# Patient Record
Sex: FEMALE | Race: WHITE | NOT HISPANIC OR LATINO | Employment: FULL TIME | URBAN - METROPOLITAN AREA
[De-identification: names, ages, dates, MRNs, and addresses within clinical notes are randomized per-mention and may not be internally consistent; named-entity substitution may affect disease eponyms.]

---

## 2017-01-20 ENCOUNTER — ALLSCRIPTS OFFICE VISIT (OUTPATIENT)
Dept: OTHER | Facility: OTHER | Age: 27
End: 2017-01-20

## 2017-04-18 ENCOUNTER — ALLSCRIPTS OFFICE VISIT (OUTPATIENT)
Dept: OTHER | Facility: OTHER | Age: 27
End: 2017-04-18

## 2017-05-18 ENCOUNTER — ALLSCRIPTS OFFICE VISIT (OUTPATIENT)
Dept: OTHER | Facility: OTHER | Age: 27
End: 2017-05-18

## 2017-06-30 ENCOUNTER — ALLSCRIPTS OFFICE VISIT (OUTPATIENT)
Dept: OTHER | Facility: OTHER | Age: 27
End: 2017-06-30

## 2017-09-06 ENCOUNTER — ALLSCRIPTS OFFICE VISIT (OUTPATIENT)
Dept: OTHER | Facility: OTHER | Age: 27
End: 2017-09-06

## 2017-11-21 ENCOUNTER — GENERIC CONVERSION - ENCOUNTER (OUTPATIENT)
Dept: OTHER | Facility: OTHER | Age: 27
End: 2017-11-21

## 2018-01-11 NOTE — RESULT NOTES
Message  PPD  placed 11-11-16  PPD read 11-14-16  negative -0- mm        Signatures   Electronically signed by : Criss Castaneda, ; Nov 14 2016  8:18AM EST                       (Author)

## 2018-01-12 VITALS
DIASTOLIC BLOOD PRESSURE: 78 MMHG | HEIGHT: 67 IN | BODY MASS INDEX: 20.72 KG/M2 | WEIGHT: 132 LBS | TEMPERATURE: 97.9 F | SYSTOLIC BLOOD PRESSURE: 100 MMHG

## 2018-01-12 NOTE — RESULT NOTES
Message   PPD p;aced on 1-11-16  PPD read on 1-13-16 -0-mm negative        Plan  Health Maintenance    · PPD    Signatures   Electronically signed by : Danielle Chao, [address] [place], [place] [address]; Jan 13 2016  1:51PM EST                       (Author)

## 2018-01-13 VITALS
SYSTOLIC BLOOD PRESSURE: 104 MMHG | BODY MASS INDEX: 20.95 KG/M2 | DIASTOLIC BLOOD PRESSURE: 78 MMHG | TEMPERATURE: 98.9 F | OXYGEN SATURATION: 98 % | WEIGHT: 133.5 LBS | HEIGHT: 67 IN | HEART RATE: 59 BPM

## 2018-01-14 VITALS
HEIGHT: 67 IN | BODY MASS INDEX: 21.19 KG/M2 | TEMPERATURE: 97.9 F | SYSTOLIC BLOOD PRESSURE: 102 MMHG | WEIGHT: 135 LBS | DIASTOLIC BLOOD PRESSURE: 80 MMHG

## 2018-01-14 VITALS
WEIGHT: 137 LBS | SYSTOLIC BLOOD PRESSURE: 120 MMHG | DIASTOLIC BLOOD PRESSURE: 68 MMHG | TEMPERATURE: 98.4 F | HEIGHT: 67 IN | BODY MASS INDEX: 21.5 KG/M2

## 2018-01-15 VITALS
BODY MASS INDEX: 20.88 KG/M2 | WEIGHT: 133 LBS | HEIGHT: 67 IN | TEMPERATURE: 98.1 F | SYSTOLIC BLOOD PRESSURE: 102 MMHG | DIASTOLIC BLOOD PRESSURE: 78 MMHG

## 2018-01-16 NOTE — PROGRESS NOTES
Chief Complaint  Pt presents in office for PPD , will return in 50 to 72 hours to have read      Active Problems    1  ADD (attention deficit disorder) without hyperactivity (314 00) (F90 0)    Current Meds   1  Tilia Fe 1-20/1-30/1-35 MG-MCG Oral Tablet; TAKE 1 TABLET DAILY AS DIRECTED; Therapy: 13SAT4962 to (Evaluate:05Tmx4408)  Requested for: 55KNN1292; Last   Rx:[id number] Ordered   2  Tri-Legest Fe 1-20/1-30/1-35 MG-MCG Oral Tablet; TAKE 1 TABLET DAILY AS   DIRECTED; Therapy: 94Qfg9938 to (Evaluate:30Vwv2031)  Requested for: 68Cjh1501; Last   Rx:[id number] Ordered   3  Vyvanse 40 MG Oral Capsule; take 1 capsule daily; Therapy: 30BUH7214 to (Evaluate:88Azx0787)  Requested for: 17XCI4501; Last   Rx:[id number] Ordered    Allergies    1  No Known Drug Allergies    Plan   PPD; INJECT 0 1  ML Subcutaneous;  Dose: 0 1; Route: Intradermal; Site: Left Forearm; Done: [id number] 09:05AM; Status: Complete;  Ordered  For: Tuberculosis screening; Ordered By:Jenni Vargas; Effective Date:11Nov2016; Administered by: Yoni Silveira: 11/11/2016 9:05:00 AM     Signatures   Electronically signed by : Aliyah Moeller, ; Nov 14 2016  8:27AM EST                       (Author)    Electronically signed by : Laxmi Nobles DO; Nov 15 2016 10:30AM EST

## 2018-01-22 VITALS
DIASTOLIC BLOOD PRESSURE: 85 MMHG | WEIGHT: 136 LBS | BODY MASS INDEX: 21.35 KG/M2 | SYSTOLIC BLOOD PRESSURE: 120 MMHG | HEIGHT: 67 IN | TEMPERATURE: 99 F

## 2018-03-20 ENCOUNTER — OFFICE VISIT (OUTPATIENT)
Dept: FAMILY MEDICINE CLINIC | Facility: CLINIC | Age: 28
End: 2018-03-20
Payer: COMMERCIAL

## 2018-03-20 VITALS
WEIGHT: 140 LBS | TEMPERATURE: 99 F | BODY MASS INDEX: 21.97 KG/M2 | DIASTOLIC BLOOD PRESSURE: 68 MMHG | SYSTOLIC BLOOD PRESSURE: 112 MMHG | HEIGHT: 67 IN

## 2018-03-20 DIAGNOSIS — M25.562 LEFT KNEE PAIN, UNSPECIFIED CHRONICITY: ICD-10-CM

## 2018-03-20 DIAGNOSIS — F98.8 ADD (ATTENTION DEFICIT DISORDER) WITHOUT HYPERACTIVITY: Primary | ICD-10-CM

## 2018-03-20 PROCEDURE — 99213 OFFICE O/P EST LOW 20 MIN: CPT | Performed by: FAMILY MEDICINE

## 2018-03-20 NOTE — PROGRESS NOTES
Assessment/Plan:  Recommended consideration for orthopedic evaluation  Card given for [employer]  No problem-specific Assessment & Plan notes found for this encounter  Diagnoses and all orders for this visit:    ADD (attention deficit disorder) without hyperactivity  -     Discontinue: lisdexamfetamine (VYVANSE) 40 MG capsule; Take 1 capsule (40 mg total) by mouth daily Max Daily Amount: 40 mg  -     Discontinue: lisdexamfetamine (VYVANSE) 40 MG capsule; Take 1 capsule (40 mg total) by mouth daily Max Daily Amount: 40 mg  -     Discontinue: lisdexamfetamine (VYVANSE) 40 MG capsule; Take 1 capsule (40 mg total) by mouth daily Max Daily Amount: 40 mg  -     lisdexamfetamine (VYVANSE) 40 MG capsule; Take 1 capsule (40 mg total) by mouth daily Max Daily Amount: 40 mg    Other orders  -     norethindrone-ethinyl estradiol-iron (TRI-LEGEST FE) 1-20/1-30/1-35 MG-MCG TABS; Take 1 tablet by mouth daily  -     Discontinue: lisdexamfetamine (VYVANSE) 40 MG capsule; Take 1 capsule by mouth daily          Subjective:      Patient ID: Radha Forman is a 24 y o  female  Patient is here for recheck on attention deficit disorder symptoms  She is tolerating medication well  Denies any palpitations or chest pain  No weight changes or appetite changes  Sleep is normal   She states that current dose is helping her to focus well with work  Denies any other significant concerns other than occasional left lateral knee pain with running  The following portions of the patient's history were reviewed and updated as appropriate: allergies, current medications, past family history, past medical history, past social history, past surgical history and problem list     Review of Systems   Constitutional: Negative  HENT: Negative  Eyes: Negative  Respiratory: Negative  Cardiovascular: Negative  Gastrointestinal: Negative  Endocrine: Negative  Genitourinary: Negative  Musculoskeletal: Positive for joint swelling  Skin: Negative  Allergic/Immunologic: Negative  Neurological: Negative  Hematological: Negative  Psychiatric/Behavioral: Negative  Objective:      /68   Temp 99 °F (37 2 °C)   Ht 5' 7" (1 702 m)   Wt 63 5 kg (140 lb)   BMI 21 93 kg/m²          Physical Exam   Constitutional: She is oriented to person, place, and time  She appears well-developed and well-nourished  HENT:   Head: Normocephalic and atraumatic  Right Ear: External ear normal  Tympanic membrane is not erythematous and not bulging  Left Ear: External ear normal  Tympanic membrane is not erythematous and not bulging  Nose: Nose normal    Mouth/Throat: Oropharynx is clear and moist and mucous membranes are normal  No oral lesions  No oropharyngeal exudate  Eyes: Conjunctivae and EOM are normal  Right eye exhibits no discharge  Left eye exhibits no discharge  No scleral icterus  Neck: Normal range of motion  Neck supple  No thyromegaly present  Cardiovascular: Normal rate, regular rhythm and normal heart sounds  Exam reveals no gallop and no friction rub  No murmur heard  Pulmonary/Chest: Effort normal  No respiratory distress  She has no wheezes  She has no rales  She exhibits no tenderness  Abdominal: Soft  Bowel sounds are normal  She exhibits no distension and no mass  There is no tenderness  There is no rebound and no guarding  Musculoskeletal: Normal range of motion  She exhibits no edema, tenderness or deformity  Lymphadenopathy:     She has no cervical adenopathy  Neurological: She is alert and oriented to person, place, and time  She has normal reflexes  No cranial nerve deficit  She exhibits normal muscle tone  Coordination normal    Skin: Skin is warm and dry  No rash noted  No erythema  No pallor  Psychiatric: She has a normal mood and affect  Her behavior is normal    Vitals reviewed

## 2018-05-15 ENCOUNTER — OFFICE VISIT (OUTPATIENT)
Dept: FAMILY MEDICINE CLINIC | Facility: CLINIC | Age: 28
End: 2018-05-15
Payer: COMMERCIAL

## 2018-05-15 VITALS
DIASTOLIC BLOOD PRESSURE: 68 MMHG | SYSTOLIC BLOOD PRESSURE: 108 MMHG | BODY MASS INDEX: 20.83 KG/M2 | WEIGHT: 133 LBS | TEMPERATURE: 99 F

## 2018-05-15 DIAGNOSIS — S39.012A STRAIN OF LUMBAR REGION, INITIAL ENCOUNTER: ICD-10-CM

## 2018-05-15 DIAGNOSIS — J01.00 ACUTE NON-RECURRENT MAXILLARY SINUSITIS: Primary | ICD-10-CM

## 2018-05-15 PROCEDURE — 99214 OFFICE O/P EST MOD 30 MIN: CPT | Performed by: FAMILY MEDICINE

## 2018-05-15 RX ORDER — FLUTICASONE PROPIONATE 50 MCG
2 SPRAY, SUSPENSION (ML) NASAL DAILY
Qty: 16 G | Refills: 0 | Status: SHIPPED | OUTPATIENT
Start: 2018-05-15 | End: 2018-06-22

## 2018-05-15 RX ORDER — AMOXICILLIN AND CLAVULANATE POTASSIUM 875; 125 MG/1; MG/1
1 TABLET, FILM COATED ORAL EVERY 12 HOURS SCHEDULED
Qty: 14 TABLET | Refills: 0 | Status: SHIPPED | OUTPATIENT
Start: 2018-05-15 | End: 2018-05-22

## 2018-05-15 NOTE — PROGRESS NOTES
Assessment/Plan:  Recommended consideration for physical therapy and naproxen twice daily  She will call if no improvement or worsening symptoms regarding back pain  She would prefer to hold off on physical therapy at this point and use over-the-counter anti-inflammatory medication  If no improvement sinus pressure and congestion recommend return to office  No problem-specific Assessment & Plan notes found for this encounter  Diagnoses and all orders for this visit:    Acute non-recurrent maxillary sinusitis  -     amoxicillin-clavulanate (AUGMENTIN) 875-125 mg per tablet; Take 1 tablet by mouth every 12 (twelve) hours for 7 days  -     fluticasone (FLONASE) 50 mcg/act nasal spray; 2 sprays into each nostril daily for 30 days          Subjective:      Patient ID: Payal Wade is a 24 y o  female  Patient here with multiple concerns  She has sinus pressure and congestion over the last 1 week  Over-the-counter decongestants have not been helpful  She denies any coughing  No fevers  She also has been playing softball in an  [employer] league  She aggravated her low back 1-2 weeks ago  She has pain to the SI joints bilaterally  No radiculopathy  No bowel or bladder incontinence  No GI complaints  She is currently not using any medication for this  Pain comes and goes and worsens with prolonged standing and feels tight 1st thing in the morning  Sore Throat    Associated symptoms include congestion  The following portions of the patient's history were reviewed and updated as appropriate: allergies, current medications, past family history, past medical history, past social history, past surgical history and problem list     Review of Systems   Constitutional: Negative for fever  HENT: Positive for congestion, sinus pressure and sore throat  Negative for sinus pain  Musculoskeletal: Positive for back pain           Objective:      /68   Temp 99 °F (37 2 °C)   Wt 60 3 kg (133 lb)   BMI 20 83 kg/m²          Physical Exam   Constitutional: She is oriented to person, place, and time  She appears well-developed and well-nourished  HENT:   Head: Normocephalic and atraumatic  Right Ear: External ear normal  Tympanic membrane is not erythematous and not bulging  Left Ear: External ear normal  Tympanic membrane is not erythematous and not bulging  Nose: Nose normal    Mouth/Throat: Oropharynx is clear and moist and mucous membranes are normal  No oral lesions  No oropharyngeal exudate  Eyes: Conjunctivae and EOM are normal  Right eye exhibits no discharge  Left eye exhibits no discharge  No scleral icterus  Neck: Normal range of motion  Neck supple  No thyromegaly present  Cardiovascular: Normal rate, regular rhythm and normal heart sounds  Exam reveals no gallop and no friction rub  No murmur heard  Pulmonary/Chest: Effort normal  No respiratory distress  She has no wheezes  She has no rales  She exhibits no tenderness  Abdominal: Soft  Bowel sounds are normal  She exhibits no distension and no mass  There is no tenderness  There is no rebound and no guarding  Musculoskeletal: Normal range of motion  She exhibits no edema, tenderness or deformity  Negative straight leg raise bilaterally  Negative forward flexion test   Deep tendon reflexes are symmetrical to patella and Achilles bilaterally  Lymphadenopathy:     She has no cervical adenopathy  Neurological: She is alert and oriented to person, place, and time  She has normal reflexes  No cranial nerve deficit  She exhibits normal muscle tone  Coordination normal    Skin: Skin is warm and dry  No rash noted  No erythema  No pallor  Psychiatric: She has a normal mood and affect  Her behavior is normal    Vitals reviewed

## 2018-06-10 DIAGNOSIS — J01.00 ACUTE NON-RECURRENT MAXILLARY SINUSITIS: ICD-10-CM

## 2018-06-10 RX ORDER — FLUTICASONE PROPIONATE 50 MCG
SPRAY, SUSPENSION (ML) NASAL
Refills: 0 | OUTPATIENT
Start: 2018-06-10

## 2018-06-22 ENCOUNTER — OFFICE VISIT (OUTPATIENT)
Dept: FAMILY MEDICINE CLINIC | Facility: CLINIC | Age: 28
End: 2018-06-22
Payer: COMMERCIAL

## 2018-06-22 VITALS
HEART RATE: 84 BPM | RESPIRATION RATE: 20 BRPM | TEMPERATURE: 98.7 F | BODY MASS INDEX: 19.85 KG/M2 | DIASTOLIC BLOOD PRESSURE: 58 MMHG | SYSTOLIC BLOOD PRESSURE: 104 MMHG | WEIGHT: 131 LBS | HEIGHT: 68 IN

## 2018-06-22 DIAGNOSIS — L03.119 CELLULITIS OF LOWER EXTREMITY, UNSPECIFIED LATERALITY: ICD-10-CM

## 2018-06-22 DIAGNOSIS — L23.7 ALLERGIC CONTACT DERMATITIS DUE TO PLANTS, EXCEPT FOOD: Primary | ICD-10-CM

## 2018-06-22 PROCEDURE — 99213 OFFICE O/P EST LOW 20 MIN: CPT | Performed by: FAMILY MEDICINE

## 2018-06-22 RX ORDER — METHYLPREDNISOLONE 4 MG/1
TABLET ORAL
Qty: 21 TABLET | Refills: 0 | Status: SHIPPED | OUTPATIENT
Start: 2018-06-22 | End: 2018-07-06 | Stop reason: ALTCHOICE

## 2018-06-22 RX ORDER — CEPHALEXIN 500 MG/1
500 CAPSULE ORAL EVERY 6 HOURS SCHEDULED
Qty: 40 CAPSULE | Refills: 0 | Status: SHIPPED | OUTPATIENT
Start: 2018-06-22 | End: 2018-07-02

## 2018-06-22 NOTE — PROGRESS NOTES
Assessment/Plan:    Discussed treatment options with patient  Patient was started on a Medrol Dosepak and cephalexin 500 mg q 6 hours for 10 days  Patient may take Zyrtec during the day and Benadryl q h s  p r n  Larayne Chroman Patient may continue calamine lotion  Increase fluids and rest   Avoid further exposure to poison ivy  Return to the office in 1 week or sooner p r n  or report to the emergency room for worsening symptoms  Diagnoses and all orders for this visit:    Allergic contact dermatitis due to plants, except food  Comments:    Medrol Dosepak  Zyrtec during the day and Benadryl q h s  p r n   Increase fluids and rest   Avoid further exposure  Orders:  -     Methylprednisolone 4 MG TBPK; Use as directed on package    Cellulitis of lower extremity, unspecified laterality  Comments:  Cephalexin 500 mg 1 q 6 hours for 10 days  Orders:  -     cephalexin (KEFLEX) 500 mg capsule; Take 1 capsule (500 mg total) by mouth every 6 (six) hours for 10 days          Subjective:      Patient ID: Awais Lawson is a 24 y o  female  Patient complains of poison ivy rash on her legs for the past 1 week  She complains of rash on her right anterior thigh and left posterior leg  Patient admits to exposure to poison ivy 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  She complains of itching, redness and painful rash  She admits to recent bruising of yellowish crusty discharge  Patient denies fever  She has treated this with calamine lotion without relief  Poison [place]   This is a new problem  The current episode started in the past 7 days  The problem has been gradually worsening since onset  The affected locations include the left lower leg and right upper leg  The rash is characterized by redness, itchiness, pain and blistering  She was exposed to plant contact  Pertinent negatives include no fatigue or fever  Past treatments include anti-itch cream  The treatment provided no relief  There is no history of allergies, asthma or eczema  The following portions of the patient's history were reviewed and updated as appropriate: allergies, current medications, past family history, past medical history, past social history, past surgical history and problem list     Review of Systems   Constitutional: Negative for fatigue and fever  Objective:      /58 (BP Location: Left arm, Patient Position: Sitting, Cuff Size: Standard)   Pulse 84   Temp 98 7 °F (37 1 °C) (Tympanic)   Resp 20   Ht 5' 7 72" (1 72 m)   Wt 59 4 kg (131 lb)   BMI 20 09 kg/m²          Physical Exam   Constitutional: She is oriented to person, place, and time  She appears well-developed and well-nourished  No distress  HENT:   Head: Normocephalic  Right Ear: External ear normal    Left Ear: External ear normal    Nose: Nose normal    Mouth/Throat: Oropharynx is clear and moist    Eyes: Conjunctivae are normal  No scleral icterus  Neck: Neck supple  Cardiovascular: Normal rate and regular rhythm  Pulmonary/Chest: Effort normal and breath sounds normal    Abdominal: Soft  There is no tenderness  Musculoskeletal: She exhibits no edema  Lymphadenopathy:     She has no cervical adenopathy  Neurological: She is alert and oriented to person, place, and time  Skin: Skin is warm and dry  Rash noted  There is erythema  Right anterior thigh with erythematous, excoriated rash with slight yellowish crusty drainage  Left posterior leg distal thigh and proximal calf and popliteal area with erythematous, excoriated rash with yellowish crusty drainage  Psychiatric: She has a normal mood and affect

## 2018-07-02 DIAGNOSIS — J01.00 ACUTE NON-RECURRENT MAXILLARY SINUSITIS: ICD-10-CM

## 2018-07-02 RX ORDER — FLUTICASONE PROPIONATE 50 MCG
SPRAY, SUSPENSION (ML) NASAL
Refills: 0 | OUTPATIENT
Start: 2018-07-02

## 2018-07-06 ENCOUNTER — OFFICE VISIT (OUTPATIENT)
Dept: FAMILY MEDICINE CLINIC | Facility: CLINIC | Age: 28
End: 2018-07-06
Payer: COMMERCIAL

## 2018-07-06 VITALS
HEART RATE: 76 BPM | OXYGEN SATURATION: 98 % | TEMPERATURE: 98.8 F | RESPIRATION RATE: 14 BRPM | WEIGHT: 127 LBS | BODY MASS INDEX: 19.93 KG/M2 | DIASTOLIC BLOOD PRESSURE: 70 MMHG | SYSTOLIC BLOOD PRESSURE: 130 MMHG | HEIGHT: 67 IN

## 2018-07-06 DIAGNOSIS — F98.8 ADD (ATTENTION DEFICIT DISORDER) WITHOUT HYPERACTIVITY: ICD-10-CM

## 2018-07-06 DIAGNOSIS — L23.7 CONTACT DERMATITIS DUE TO POISON IVY: Primary | ICD-10-CM

## 2018-07-06 PROCEDURE — 99213 OFFICE O/P EST LOW 20 MIN: CPT | Performed by: FAMILY MEDICINE

## 2018-07-06 PROCEDURE — 3008F BODY MASS INDEX DOCD: CPT | Performed by: FAMILY MEDICINE

## 2018-07-06 RX ORDER — PREDNISONE 10 MG/1
TABLET ORAL
Qty: 33 TABLET | Refills: 0 | Status: SHIPPED | OUTPATIENT
Start: 2018-07-06 | End: 2018-10-08

## 2018-07-06 NOTE — ASSESSMENT & PLAN NOTE
We will reduce the dose of Vyvanse from 40 milligrams down to 30 milligrams daily  New prescription given

## 2018-07-06 NOTE — PROGRESS NOTES
Assessment/Plan:  Side effect profile medication reviewed with patient  Recommend return to office if no improvement  We also reduced the dose of the Vyvanse  She will take 30 milligrams daily  ADD (attention deficit disorder) without hyperactivity  We will reduce the dose of Vyvanse from 40 milligrams down to 30 milligrams daily  New prescription given  Diagnoses and all orders for this visit:    Contact dermatitis due to poison ivy  -     predniSONE 10 mg tablet; 6 tablets daily, all at one time, with food  Then on day #4 decrease by 1 pill each day until finished  ADD (attention deficit disorder) without hyperactivity  -     Discontinue: lisdexamfetamine (VYVANSE) 30 MG capsule; Take 1 capsule (30 mg total) by mouth every morning Max Daily Amount: 30 mg  -     Discontinue: lisdexamfetamine (VYVANSE) 30 MG capsule; Take 1 capsule (30 mg total) by mouth every morning Max Daily Amount: 30 mg  -     lisdexamfetamine (VYVANSE) 30 MG capsule; Take 1 capsule (30 mg total) by mouth every morning Max Daily Amount: 30 mg          Subjective:      Patient ID: Markos Coates is a 24 y o  female  Patient with recurrence of poison ivy rash to the bilateral arms  Onset 3-4 days ago  She also would like to decrease her dose of Vyvanse  She is currently taking 40 milligrams daily  Poison Ivy         The following portions of the patient's history were reviewed and updated as appropriate: allergies, current medications, past family history, past medical history, past social history, past surgical history and problem list     Review of Systems   Constitutional: Negative  HENT: Negative  Eyes: Negative  Respiratory: Negative  Cardiovascular: Negative  Gastrointestinal: Negative  Endocrine: Negative  Genitourinary: Negative  Musculoskeletal: Negative  Skin: Positive for rash  Allergic/Immunologic: Negative  Neurological: Negative  Hematological: Negative  Psychiatric/Behavioral: Negative  Objective:      /70 (BP Location: Left arm, Patient Position: Sitting, Cuff Size: Adult)   Pulse 76   Temp 98 8 °F (37 1 °C)   Resp 14   Ht 5' 7 32" (1 71 m)   Wt 57 6 kg (127 lb)   SpO2 98%   BMI 19 70 kg/m²          Physical Exam   Constitutional: She is oriented to person, place, and time  She appears well-developed and well-nourished  HENT:   Head: Normocephalic and atraumatic  Right Ear: External ear normal  Tympanic membrane is not erythematous and not bulging  Left Ear: External ear normal  Tympanic membrane is not erythematous and not bulging  Nose: Nose normal    Mouth/Throat: Oropharynx is clear and moist and mucous membranes are normal  No oral lesions  No oropharyngeal exudate  Eyes: Conjunctivae and EOM are normal  Right eye exhibits no discharge  Left eye exhibits no discharge  No scleral icterus  Neck: Normal range of motion  Neck supple  No thyromegaly present  Cardiovascular: Normal rate, regular rhythm and normal heart sounds  Exam reveals no gallop and no friction rub  No murmur heard  Pulmonary/Chest: Effort normal  No respiratory distress  She has no wheezes  She has no rales  She exhibits no tenderness  Abdominal: Soft  Bowel sounds are normal  She exhibits no distension and no mass  There is no tenderness  There is no rebound and no guarding  Musculoskeletal: Normal range of motion  She exhibits no edema, tenderness or deformity  Lymphadenopathy:     She has no cervical adenopathy  Neurological: She is alert and oriented to person, place, and time  She has normal reflexes  No cranial nerve deficit  She exhibits normal muscle tone  Coordination normal    Skin: Skin is warm and dry  No rash (Patchy erythematous rash to the bilateral antecubital areas of both arms ) noted  No erythema  No pallor  Psychiatric: She has a normal mood and affect  Her behavior is normal    Vitals reviewed

## 2018-08-28 DIAGNOSIS — Z30.9 ENCOUNTER FOR CONTRACEPTIVE MANAGEMENT, UNSPECIFIED TYPE: Primary | ICD-10-CM

## 2018-08-28 RX ORDER — NORETHINDRONE ACETATE AND ETHINYL ESTRADIOL 5-7-9-7
KIT ORAL
Qty: 140 TABLET | Refills: 2 | Status: SHIPPED | OUTPATIENT
Start: 2018-08-28 | End: 2019-09-01 | Stop reason: SDUPTHER

## 2018-10-08 ENCOUNTER — OFFICE VISIT (OUTPATIENT)
Dept: FAMILY MEDICINE CLINIC | Facility: CLINIC | Age: 28
End: 2018-10-08
Payer: COMMERCIAL

## 2018-10-08 VITALS
TEMPERATURE: 98.8 F | BODY MASS INDEX: 19.93 KG/M2 | WEIGHT: 127 LBS | DIASTOLIC BLOOD PRESSURE: 80 MMHG | HEIGHT: 67 IN | SYSTOLIC BLOOD PRESSURE: 122 MMHG

## 2018-10-08 DIAGNOSIS — F98.8 ADD (ATTENTION DEFICIT DISORDER) WITHOUT HYPERACTIVITY: ICD-10-CM

## 2018-10-08 PROCEDURE — 99213 OFFICE O/P EST LOW 20 MIN: CPT | Performed by: FAMILY MEDICINE

## 2018-10-08 NOTE — PROGRESS NOTES
Assessment/Plan:  Refill on medication prescribed for the next 3 months  Recommend return to office for recheck again in 3-6 months  Sooner if needed  Side effect profile medication reviewed  No problem-specific Assessment & Plan notes found for this encounter  Diagnoses and all orders for this visit:    ADD (attention deficit disorder) without hyperactivity  -     Discontinue: lisdexamfetamine (VYVANSE) 30 MG capsule; Take 1 capsule (30 mg total) by mouth every morning Earliest Fill Date: 10/8/18 Max Daily Amount: 30 mg  -     Discontinue: lisdexamfetamine (VYVANSE) 30 MG capsule; Take 1 capsule (30 mg total) by mouth every morning Max Daily Amount: 30 mg  -     lisdexamfetamine (VYVANSE) 30 MG capsule; Take 1 capsule (30 mg total) by mouth every morning Max Daily Amount: 30 mg          Subjective:      Patient ID: Camille Lowery is a 24 y o  female  Patient here for recheck on attention deficit disorder symptoms  She is doing well  Medications working well for her  She is tolerating medication without any side effects  Medication Refill         The following portions of the patient's history were reviewed and updated as appropriate: allergies, current medications, past family history, past medical history, past social history, past surgical history and problem list     Review of Systems   Constitutional: Negative  HENT: Negative  Eyes: Negative  Respiratory: Negative  Cardiovascular: Negative  Gastrointestinal: Negative  Endocrine: Negative  Genitourinary: Negative  Musculoskeletal: Negative  Skin: Negative  Allergic/Immunologic: Negative  Neurological: Negative  Hematological: Negative  Psychiatric/Behavioral: Negative  Objective:      /80   Temp 98 8 °F (37 1 °C)   Ht 5' 7 32" (1 71 m)   Wt 57 6 kg (127 lb)   BMI 19 70 kg/m²          Physical Exam   Constitutional: She is oriented to person, place, and time   She appears well-developed and well-nourished  HENT:   Head: Normocephalic and atraumatic  Right Ear: External ear normal  Tympanic membrane is not erythematous and not bulging  Left Ear: External ear normal  Tympanic membrane is not erythematous and not bulging  Nose: Nose normal    Mouth/Throat: Oropharynx is clear and moist and mucous membranes are normal  No oral lesions  No oropharyngeal exudate  Eyes: Conjunctivae and EOM are normal  Right eye exhibits no discharge  Left eye exhibits no discharge  No scleral icterus  Neck: Normal range of motion  Neck supple  No thyromegaly present  Cardiovascular: Normal rate, regular rhythm and normal heart sounds  Exam reveals no gallop and no friction rub  No murmur heard  Pulmonary/Chest: Effort normal  No respiratory distress  She has no wheezes  She has no rales  She exhibits no tenderness  Abdominal: Soft  Bowel sounds are normal  She exhibits no distension and no mass  There is no tenderness  There is no rebound and no guarding  Musculoskeletal: Normal range of motion  She exhibits no edema, tenderness or deformity  Lymphadenopathy:     She has no cervical adenopathy  Neurological: She is alert and oriented to person, place, and time  She has normal reflexes  No cranial nerve deficit  She exhibits normal muscle tone  Coordination normal    Skin: Skin is warm and dry  No rash noted  No erythema  No pallor  Psychiatric: She has a normal mood and affect  Her behavior is normal    Vitals reviewed

## 2019-01-07 ENCOUNTER — OFFICE VISIT (OUTPATIENT)
Dept: FAMILY MEDICINE CLINIC | Facility: CLINIC | Age: 29
End: 2019-01-07
Payer: COMMERCIAL

## 2019-01-07 VITALS
HEART RATE: 72 BPM | SYSTOLIC BLOOD PRESSURE: 102 MMHG | HEIGHT: 68 IN | WEIGHT: 137 LBS | TEMPERATURE: 99.1 F | OXYGEN SATURATION: 98 % | BODY MASS INDEX: 20.76 KG/M2 | DIASTOLIC BLOOD PRESSURE: 68 MMHG

## 2019-01-07 DIAGNOSIS — F98.8 ADD (ATTENTION DEFICIT DISORDER) WITHOUT HYPERACTIVITY: ICD-10-CM

## 2019-01-07 PROCEDURE — 99213 OFFICE O/P EST LOW 20 MIN: CPT | Performed by: FAMILY MEDICINE

## 2019-01-07 PROCEDURE — 1036F TOBACCO NON-USER: CPT | Performed by: FAMILY MEDICINE

## 2019-01-07 PROCEDURE — 3008F BODY MASS INDEX DOCD: CPT | Performed by: FAMILY MEDICINE

## 2019-01-07 NOTE — PROGRESS NOTES
Assessment/Plan:  Continue Vyvanse  Return to office for recheck if no improvement or worsening symptoms  No problem-specific Assessment & Plan notes found for this encounter  Diagnoses and all orders for this visit:    ADD (attention deficit disorder) without hyperactivity  -     Discontinue: lisdexamfetamine (VYVANSE) 30 MG capsule; Take 1 capsule (30 mg total) by mouth every morning Earliest Fill Date: 1/7/19 Max Daily Amount: 30 mg  -     Discontinue: lisdexamfetamine (VYVANSE) 30 MG capsule; Take 1 capsule (30 mg total) by mouth every morning Earliest Fill Date: 2/7/19 Max Daily Amount: 30 mg  -     Discontinue: lisdexamfetamine (VYVANSE) 30 MG capsule; Take 1 capsule (30 mg total) by mouth every morning Earliest Fill Date: 3/7/19 Max Daily Amount: 30 mg  -     lisdexamfetamine (VYVANSE) 30 MG capsule; Take 1 capsule (30 mg total) by mouth every morning Earliest Fill Date: 4/7/19 Max Daily Amount: 30 mg          Subjective:      Patient ID: Ghislaine Ramon is a 34 y o  female  Patient here for recheck on attention deficit disorder  She is currently doing well a current dose of medication  No significant concerns or complaints  Medication Refill         The following portions of the patient's history were reviewed and updated as appropriate: allergies, current medications, past family history, past medical history, past social history, past surgical history and problem list     Review of Systems   Constitutional: Negative  HENT: Negative  Eyes: Negative  Respiratory: Negative  Cardiovascular: Negative  Gastrointestinal: Negative  Endocrine: Negative  Genitourinary: Negative  Musculoskeletal: Negative  Skin: Negative  Allergic/Immunologic: Negative  Neurological: Negative  Hematological: Negative  Psychiatric/Behavioral: Negative            Objective:      /68 (BP Location: Left arm, Patient Position: Sitting, Cuff Size: Standard)   Pulse 72   Temp 99 1 °F (37 3 °C) (Tympanic)   Ht 5' 7 82" (1 723 m)   Wt 62 1 kg (137 lb)   SpO2 98%   BMI 20 94 kg/m²          Physical Exam   Constitutional: She is oriented to person, place, and time  She appears well-developed and well-nourished  HENT:   Head: Normocephalic and atraumatic  Right Ear: External ear normal  Tympanic membrane is not erythematous and not bulging  Left Ear: External ear normal  Tympanic membrane is not erythematous and not bulging  Nose: Nose normal    Mouth/Throat: Oropharynx is clear and moist and mucous membranes are normal  No oral lesions  No oropharyngeal exudate  Eyes: Conjunctivae and EOM are normal  Right eye exhibits no discharge  Left eye exhibits no discharge  No scleral icterus  Neck: Normal range of motion  Neck supple  No thyromegaly present  Cardiovascular: Normal rate, regular rhythm and normal heart sounds  Exam reveals no gallop and no friction rub  No murmur heard  Pulmonary/Chest: Effort normal  No respiratory distress  She has no wheezes  She has no rales  She exhibits no tenderness  Abdominal: Soft  Bowel sounds are normal  She exhibits no distension and no mass  There is no tenderness  There is no rebound and no guarding  Musculoskeletal: Normal range of motion  She exhibits no edema, tenderness or deformity  Lymphadenopathy:     She has no cervical adenopathy  Neurological: She is alert and oriented to person, place, and time  She has normal reflexes  No cranial nerve deficit  She exhibits normal muscle tone  Coordination normal    Skin: Skin is warm and dry  No rash noted  No erythema  No pallor  Psychiatric: She has a normal mood and affect  Her behavior is normal    Vitals reviewed

## 2019-05-06 ENCOUNTER — OFFICE VISIT (OUTPATIENT)
Dept: FAMILY MEDICINE CLINIC | Facility: CLINIC | Age: 29
End: 2019-05-06
Payer: COMMERCIAL

## 2019-05-06 VITALS
TEMPERATURE: 98.9 F | DIASTOLIC BLOOD PRESSURE: 70 MMHG | BODY MASS INDEX: 21.1 KG/M2 | WEIGHT: 138 LBS | SYSTOLIC BLOOD PRESSURE: 116 MMHG

## 2019-05-06 DIAGNOSIS — Z30.9 ENCOUNTER FOR CONTRACEPTIVE MANAGEMENT, UNSPECIFIED TYPE: ICD-10-CM

## 2019-05-06 DIAGNOSIS — F98.8 ADD (ATTENTION DEFICIT DISORDER) WITHOUT HYPERACTIVITY: ICD-10-CM

## 2019-05-06 PROCEDURE — 99213 OFFICE O/P EST LOW 20 MIN: CPT | Performed by: FAMILY MEDICINE

## 2019-07-01 ENCOUNTER — OFFICE VISIT (OUTPATIENT)
Dept: FAMILY MEDICINE CLINIC | Facility: CLINIC | Age: 29
End: 2019-07-01
Payer: COMMERCIAL

## 2019-07-01 VITALS
HEIGHT: 68 IN | SYSTOLIC BLOOD PRESSURE: 106 MMHG | WEIGHT: 134 LBS | BODY MASS INDEX: 20.31 KG/M2 | TEMPERATURE: 98.7 F | DIASTOLIC BLOOD PRESSURE: 60 MMHG

## 2019-07-01 DIAGNOSIS — L23.7 CONTACT DERMATITIS DUE TO POISON IVY: Primary | ICD-10-CM

## 2019-07-01 DIAGNOSIS — F98.8 ADD (ATTENTION DEFICIT DISORDER) WITHOUT HYPERACTIVITY: ICD-10-CM

## 2019-07-01 PROCEDURE — 3008F BODY MASS INDEX DOCD: CPT | Performed by: FAMILY MEDICINE

## 2019-07-01 PROCEDURE — 1036F TOBACCO NON-USER: CPT | Performed by: FAMILY MEDICINE

## 2019-07-01 PROCEDURE — 99213 OFFICE O/P EST LOW 20 MIN: CPT | Performed by: FAMILY MEDICINE

## 2019-07-01 RX ORDER — LISDEXAMFETAMINE DIMESYLATE 30 MG/1
30 CAPSULE ORAL EVERY MORNING
Refills: 0 | COMMUNITY
Start: 2019-05-06 | End: 2019-07-01

## 2019-07-01 RX ORDER — PREDNISONE 10 MG/1
TABLET ORAL
Qty: 33 TABLET | Refills: 1 | Status: SHIPPED | OUTPATIENT
Start: 2019-07-01 | End: 2019-08-30 | Stop reason: ALTCHOICE

## 2019-07-01 NOTE — PROGRESS NOTES
Assessment/Plan:  Side effect profile medication reviewed  Benadryl recommended at night if needed  She will call with any worsening or new symptoms in the interim  No problem-specific Assessment & Plan notes found for this encounter  Diagnoses and all orders for this visit:    Contact dermatitis due to poison ivy  -     predniSONE 10 mg tablet; 6 tablets daily, all at one time, with food  Then on day #4 decrease by 1 pill each day until finished  ADD (attention deficit disorder) without hyperactivity  -     Discontinue: lisdexamfetamine (VYVANSE) 20 MG capsule; Take 1 capsule (20 mg total) by mouth every morningMax Daily Amount: 20 mg  -     lisdexamfetamine (VYVANSE) 20 MG capsule; Take 1 capsule (20 mg total) by mouth every morningMax Daily Amount: 20 mg    Other orders  -     Discontinue: VYVANSE 30 MG capsule; Take 30 mg by mouth every morning          Subjective:      Patient ID: Maya Haile is a 34 y o  female  Patient here with poison ivy rash to the legs and arms  Onset 5 days ago  She states she was doing yd work and believes she got the rash from doing this  She also needs refill on her Vyvanse for August and September  She is tolerating this medication well without any side effects  It is helping her focus  No weight changes or palpitations  The following portions of the patient's history were reviewed and updated as appropriate: allergies, current medications, past family history, past medical history, past social history, past surgical history and problem list     Review of Systems   Constitutional: Negative  HENT: Negative  Eyes: Negative  Respiratory: Negative  Cardiovascular: Negative  Gastrointestinal: Negative  Endocrine: Negative  Genitourinary: Negative  Musculoskeletal: Negative  Skin: Positive for rash  Allergic/Immunologic: Negative  Neurological: Negative  Hematological: Negative  Psychiatric/Behavioral: Negative  Objective:      /60 (BP Location: Left arm, Patient Position: Sitting, Cuff Size: Adult)   Temp 98 7 °F (37 1 °C)   Ht 5' 7 72" (1 72 m)   Wt 60 8 kg (134 lb)   BMI 20 55 kg/m²          Physical Exam   Constitutional: She is oriented to person, place, and time  She appears well-developed and well-nourished  HENT:   Head: Normocephalic and atraumatic  Right Ear: External ear normal  Tympanic membrane is not erythematous and not bulging  Left Ear: External ear normal  Tympanic membrane is not erythematous and not bulging  Nose: Nose normal    Mouth/Throat: Oropharynx is clear and moist and mucous membranes are normal  No oral lesions  No oropharyngeal exudate  Eyes: Conjunctivae and EOM are normal  Right eye exhibits no discharge  Left eye exhibits no discharge  No scleral icterus  Neck: Normal range of motion  Neck supple  No thyromegaly present  Cardiovascular: Normal rate, regular rhythm and normal heart sounds  Exam reveals no gallop and no friction rub  No murmur heard  Pulmonary/Chest: Effort normal  No respiratory distress  She has no wheezes  She has no rales  She exhibits no tenderness  Abdominal: Soft  Bowel sounds are normal  She exhibits no distension and no mass  There is no tenderness  There is no rebound and no guarding  Musculoskeletal: Normal range of motion  She exhibits no edema, tenderness or deformity  Lymphadenopathy:     She has no cervical adenopathy  Neurological: She is alert and oriented to person, place, and time  She has normal reflexes  No cranial nerve deficit  She exhibits normal muscle tone  Coordination normal    Skin: Skin is warm and dry  Rash (Macular papular rash to the arms and legs distally consistent with contact dermatitis from poison ivy ) noted  No erythema  No pallor  Psychiatric: She has a normal mood and affect  Her behavior is normal    Vitals reviewed

## 2019-08-30 ENCOUNTER — OFFICE VISIT (OUTPATIENT)
Dept: FAMILY MEDICINE CLINIC | Facility: CLINIC | Age: 29
End: 2019-08-30
Payer: COMMERCIAL

## 2019-08-30 VITALS
BODY MASS INDEX: 21.22 KG/M2 | OXYGEN SATURATION: 98 % | TEMPERATURE: 99.3 F | SYSTOLIC BLOOD PRESSURE: 128 MMHG | WEIGHT: 140 LBS | HEART RATE: 66 BPM | DIASTOLIC BLOOD PRESSURE: 82 MMHG | HEIGHT: 68 IN

## 2019-08-30 DIAGNOSIS — F98.8 ADD (ATTENTION DEFICIT DISORDER) WITHOUT HYPERACTIVITY: Primary | ICD-10-CM

## 2019-08-30 PROCEDURE — 3008F BODY MASS INDEX DOCD: CPT | Performed by: FAMILY MEDICINE

## 2019-08-30 PROCEDURE — 99213 OFFICE O/P EST LOW 20 MIN: CPT | Performed by: FAMILY MEDICINE

## 2019-08-30 NOTE — PROGRESS NOTES
Assessment/Plan:  Refills on Vyvanse given for the next 4 months  Return to office in approximately 4 months for recheck  Sooner if needed  She will continue to wean down on the medication  No problem-specific Assessment & Plan notes found for this encounter  Diagnoses and all orders for this visit:    ADD (attention deficit disorder) without hyperactivity  -     Discontinue: lisdexamfetamine (VYVANSE) 20 MG capsule; Take 1 capsule (20 mg total) by mouth every morningMax Daily Amount: 20 mg  -     Discontinue: lisdexamfetamine (VYVANSE) 20 MG capsule; Take 1 capsule (20 mg total) by mouth every morningMax Daily Amount: 20 mg  -     Discontinue: lisdexamfetamine (VYVANSE) 20 MG capsule; Take 1 capsule (20 mg total) by mouth every morningMax Daily Amount: 20 mg  -     lisdexamfetamine (VYVANSE) 20 MG capsule; Take 1 capsule (20 mg total) by mouth every morningMax Daily Amount: 20 mg          Subjective:      Patient ID: Karsten Perez is a 34 y o  female  Patient here for recheck on attention deficit disorder  She is gradually weaning down on her dose of Vyvanse  She has wean from 30 down to 20 mg  She states that 20 mg is working well for her  No side effects  Denies any sleep disturbance appetite changes or heart palpitations or chest pain  Denies any anxiety  The following portions of the patient's history were reviewed and updated as appropriate: allergies, current medications, past family history, past medical history, past social history, past surgical history and problem list     Review of Systems   Constitutional: Negative  HENT: Negative  Eyes: Negative  Respiratory: Negative  Cardiovascular: Negative  Gastrointestinal: Negative  Endocrine: Negative  Genitourinary: Negative  Musculoskeletal: Negative  Skin: Negative  Allergic/Immunologic: Negative  Neurological: Negative  Hematological: Negative  Psychiatric/Behavioral: Negative  Objective:      /82 (BP Location: Left arm, Patient Position: Sitting, Cuff Size: Adult)   Pulse 66   Temp 99 3 °F (37 4 °C) (Tympanic)   Ht 5' 7 5" (1 715 m)   Wt 63 5 kg (140 lb)   SpO2 98%   BMI 21 60 kg/m²          Physical Exam   Constitutional: She is oriented to person, place, and time  She appears well-developed and well-nourished  HENT:   Head: Normocephalic and atraumatic  Right Ear: External ear normal  Tympanic membrane is not erythematous and not bulging  Left Ear: External ear normal  Tympanic membrane is not erythematous and not bulging  Nose: Nose normal    Mouth/Throat: Oropharynx is clear and moist and mucous membranes are normal  No oral lesions  No oropharyngeal exudate  Eyes: Conjunctivae and EOM are normal  Right eye exhibits no discharge  Left eye exhibits no discharge  No scleral icterus  Neck: Normal range of motion  Neck supple  No thyromegaly present  Cardiovascular: Normal rate, regular rhythm and normal heart sounds  Exam reveals no gallop and no friction rub  No murmur heard  Pulmonary/Chest: Effort normal  No respiratory distress  She has no wheezes  She has no rales  She exhibits no tenderness  Abdominal: Soft  Bowel sounds are normal  She exhibits no distension and no mass  There is no tenderness  There is no rebound and no guarding  Musculoskeletal: Normal range of motion  She exhibits no edema, tenderness or deformity  Lymphadenopathy:     She has no cervical adenopathy  Neurological: She is alert and oriented to person, place, and time  She has normal reflexes  No cranial nerve deficit  She exhibits normal muscle tone  Coordination normal    Skin: Skin is warm and dry  No rash noted  No erythema  No pallor  Psychiatric: She has a normal mood and affect  Her behavior is normal    Vitals reviewed

## 2019-09-01 DIAGNOSIS — Z30.9 ENCOUNTER FOR CONTRACEPTIVE MANAGEMENT, UNSPECIFIED TYPE: ICD-10-CM

## 2019-09-03 RX ORDER — NORETHINDRONE ACETATE AND ETHINYL ESTRADIOL 5-7-9-7
KIT ORAL
Qty: 140 TABLET | Refills: 2 | Status: ON HOLD | OUTPATIENT
Start: 2019-09-03 | End: 2021-08-18 | Stop reason: ALTCHOICE

## 2020-01-20 ENCOUNTER — OFFICE VISIT (OUTPATIENT)
Dept: FAMILY MEDICINE CLINIC | Facility: CLINIC | Age: 30
End: 2020-01-20
Payer: COMMERCIAL

## 2020-01-20 VITALS
SYSTOLIC BLOOD PRESSURE: 128 MMHG | DIASTOLIC BLOOD PRESSURE: 78 MMHG | WEIGHT: 142 LBS | OXYGEN SATURATION: 98 % | HEART RATE: 78 BPM | BODY MASS INDEX: 22.29 KG/M2 | TEMPERATURE: 99.2 F | HEIGHT: 67 IN

## 2020-01-20 DIAGNOSIS — F98.8 ADD (ATTENTION DEFICIT DISORDER) WITHOUT HYPERACTIVITY: ICD-10-CM

## 2020-01-20 PROCEDURE — 99213 OFFICE O/P EST LOW 20 MIN: CPT | Performed by: FAMILY MEDICINE

## 2020-01-20 PROCEDURE — 1036F TOBACCO NON-USER: CPT | Performed by: FAMILY MEDICINE

## 2020-01-20 PROCEDURE — 3008F BODY MASS INDEX DOCD: CPT | Performed by: FAMILY MEDICINE

## 2020-01-20 NOTE — PROGRESS NOTES
Assessment/Plan: We will continue Vyvanse daily  We discussed possibly holding medication if she becomes pregnant  She continues on birth control pills  We will see her again in 6 months for recheck  Sooner if needed  No problem-specific Assessment & Plan notes found for this encounter  Diagnoses and all orders for this visit:    ADD (attention deficit disorder) without hyperactivity  -     Discontinue: lisdexamfetamine (VYVANSE) 20 MG capsule; Take 1 capsule (20 mg total) by mouth every morningMax Daily Amount: 20 mg  -     lisdexamfetamine (VYVANSE) 20 MG capsule; Take 1 capsule (20 mg total) by mouth every morningMax Daily Amount: 20 mg          Subjective:      Patient ID: Joe Thomas is a 34 y o  female  Patient here for recheck on attention deficit disorder  She is generally doing well  No concerns or complaints today  Tolerating medication well  No side effects  No sleep disturbances or anxiety or palpitations  The following portions of the patient's history were reviewed and updated as appropriate: allergies, current medications, past family history, past medical history, past social history, past surgical history and problem list     Review of Systems   Constitutional: Negative  HENT: Negative  Eyes: Negative  Respiratory: Negative  Cardiovascular: Negative  Gastrointestinal: Negative  Endocrine: Negative  Genitourinary: Negative  Musculoskeletal: Negative  Skin: Negative  Allergic/Immunologic: Negative  Neurological: Negative  Hematological: Negative  Psychiatric/Behavioral: Negative  Objective:      /78 (BP Location: Left arm, Patient Position: Sitting, Cuff Size: Standard)   Pulse 78   Temp 99 2 °F (37 3 °C) (Tympanic)   Ht 5' 7 13" (1 705 m)   Wt 64 4 kg (142 lb)   SpO2 98%   BMI 22 16 kg/m²          Physical Exam   Constitutional: She is oriented to person, place, and time   She appears well-developed and well-nourished  HENT:   Head: Normocephalic and atraumatic  Right Ear: External ear normal  Tympanic membrane is not erythematous and not bulging  Left Ear: External ear normal  Tympanic membrane is not erythematous and not bulging  Nose: Nose normal    Mouth/Throat: Oropharynx is clear and moist and mucous membranes are normal  No oral lesions  No oropharyngeal exudate  Eyes: Conjunctivae and EOM are normal  Right eye exhibits no discharge  Left eye exhibits no discharge  No scleral icterus  Neck: Normal range of motion  Neck supple  No thyromegaly present  Cardiovascular: Normal rate, regular rhythm and normal heart sounds  Exam reveals no gallop and no friction rub  No murmur heard  Pulmonary/Chest: Effort normal  No respiratory distress  She has no wheezes  She has no rales  She exhibits no tenderness  Abdominal: Soft  Bowel sounds are normal  She exhibits no distension and no mass  There is no tenderness  There is no rebound and no guarding  Musculoskeletal: Normal range of motion  She exhibits no edema, tenderness or deformity  Lymphadenopathy:     She has no cervical adenopathy  Neurological: She is alert and oriented to person, place, and time  She has normal reflexes  No cranial nerve deficit  She exhibits normal muscle tone  Coordination normal    Skin: Skin is warm and dry  No rash noted  No erythema  No pallor  Psychiatric: She has a normal mood and affect  Her behavior is normal    Vitals reviewed

## 2020-01-20 NOTE — PROGRESS NOTES
BMI Counseling: Body mass index is 22 16 kg/m²  The BMI is above normal  Nutrition recommendations include reducing portion sizes

## 2020-02-27 DIAGNOSIS — F98.8 ADD (ATTENTION DEFICIT DISORDER) WITHOUT HYPERACTIVITY: ICD-10-CM

## 2020-03-29 DIAGNOSIS — F98.8 ADD (ATTENTION DEFICIT DISORDER) WITHOUT HYPERACTIVITY: ICD-10-CM

## 2020-04-27 DIAGNOSIS — F98.8 ADD (ATTENTION DEFICIT DISORDER) WITHOUT HYPERACTIVITY: ICD-10-CM

## 2020-11-30 ENCOUNTER — TELEMEDICINE (OUTPATIENT)
Dept: FAMILY MEDICINE CLINIC | Facility: CLINIC | Age: 30
End: 2020-11-30
Payer: COMMERCIAL

## 2020-11-30 DIAGNOSIS — Z11.59 SCREENING FOR VIRAL DISEASE: ICD-10-CM

## 2020-11-30 DIAGNOSIS — Z11.59 SCREENING FOR VIRAL DISEASE: Primary | ICD-10-CM

## 2020-11-30 PROCEDURE — 99214 OFFICE O/P EST MOD 30 MIN: CPT | Performed by: FAMILY MEDICINE

## 2020-11-30 PROCEDURE — U0003 INFECTIOUS AGENT DETECTION BY NUCLEIC ACID (DNA OR RNA); SEVERE ACUTE RESPIRATORY SYNDROME CORONAVIRUS 2 (SARS-COV-2) (CORONAVIRUS DISEASE [COVID-19]), AMPLIFIED PROBE TECHNIQUE, MAKING USE OF HIGH THROUGHPUT TECHNOLOGIES AS DESCRIBED BY CMS-2020-01-R: HCPCS | Performed by: FAMILY MEDICINE

## 2020-12-01 LAB — SARS-COV-2 RNA SPEC QL NAA+PROBE: DETECTED

## 2020-12-02 ENCOUNTER — TELEMEDICINE (OUTPATIENT)
Dept: FAMILY MEDICINE CLINIC | Facility: CLINIC | Age: 30
End: 2020-12-02
Payer: COMMERCIAL

## 2020-12-02 DIAGNOSIS — U07.1 COVID-19: Primary | ICD-10-CM

## 2020-12-02 PROCEDURE — 99213 OFFICE O/P EST LOW 20 MIN: CPT | Performed by: FAMILY MEDICINE

## 2020-12-04 ENCOUNTER — TELEMEDICINE (OUTPATIENT)
Dept: FAMILY MEDICINE CLINIC | Facility: CLINIC | Age: 30
End: 2020-12-04
Payer: COMMERCIAL

## 2020-12-04 DIAGNOSIS — U07.1 COVID-19: Primary | ICD-10-CM

## 2020-12-04 PROCEDURE — 99214 OFFICE O/P EST MOD 30 MIN: CPT | Performed by: FAMILY MEDICINE

## 2020-12-04 PROCEDURE — 1036F TOBACCO NON-USER: CPT | Performed by: FAMILY MEDICINE

## 2020-12-07 ENCOUNTER — TELEPHONE (OUTPATIENT)
Dept: FAMILY MEDICINE CLINIC | Facility: CLINIC | Age: 30
End: 2020-12-07

## 2020-12-18 ENCOUNTER — TELEPHONE (OUTPATIENT)
Dept: FAMILY MEDICINE CLINIC | Facility: CLINIC | Age: 30
End: 2020-12-18

## 2021-07-15 ENCOUNTER — TELEPHONE (OUTPATIENT)
Dept: HEMATOLOGY ONCOLOGY | Facility: CLINIC | Age: 31
End: 2021-07-15

## 2021-07-15 NOTE — TELEPHONE ENCOUNTER
New Patient Request   Patient Details:     Janee Parker      1990      [id number]      Reason for Appointment   Who is calling to schedule? Taylor Moreno  From office    If not Patient, what is their name? What is the diagnosis? Malignant  Melanoma    Who is the referring doctor? Dr Gabriela Mack are you scheduling with ? surg    Preferred [facility] Number to call back on? If calling from the [facility], use the Nurse number   [phone]   Miscellaneous Information:     Please advise the patient, a new patient  will be calling them back within 1 business day

## 2021-07-16 ENCOUNTER — TELEPHONE (OUTPATIENT)
Dept: HEMATOLOGY ONCOLOGY | Facility: CLINIC | Age: 31
End: 2021-07-16

## 2021-07-16 NOTE — TELEPHONE ENCOUNTER
Patient calling in to advise that her records have been faxed over from  [facility]   Patient can be reached at [phone]

## 2021-07-16 NOTE — TELEPHONE ENCOUNTER
New Patient Intake Skin Malignancy   Patient Details:     Cait Deras     1990     [id number]     Background Information:   [address] starts by opening a telephone encounter and gathering the following information   Who is calling to schedule? Patient   Referring Provider Dr Meggan Harris   To Which Speciality? Surgical Oncology   Reason for Visit? New Diagnosis   Tumor Type/Diagnosis? Malignant Melanoma - Right Shoulder   Is this Cancer or Non-Cancer? Cancer   Is there a confirmed diagnosis from Biopsy/tissue reviewed by Pathology?   no   Date of Tissue Diagnosis  If outside of [facility] obtain report and slides n/a   Is the patient aware of diagnosis? yes   Has Imaging been done?  no   If so, when and where? If outside of [facility], obtain records and disk n/a   Has Bloodwork been done?   no   If so, when and where? If outside of [facility], obtain records  n/a   Is there a personal history of cancer? If yes, what kind? no   Is there a family history of cancer? If so, what kind? Yes - breast   Scheduling Information:   [facility]   Are there any days the patient cannot be seen?  august 5-8 cannot be seen   Will you see a different provider if we can schedule your appointment sooner? yes   Miscellaneous:  gave fax number and advised we need records to review  Advised once we get them we will be able to schedule  After completing the above information, please route to finance, nurse navigation and clinical trials for review

## 2021-07-20 ENCOUNTER — TELEPHONE (OUTPATIENT)
Dept: HEMATOLOGY ONCOLOGY | Facility: CLINIC | Age: 31
End: 2021-07-20

## 2021-07-20 NOTE — TELEPHONE ENCOUNTER
Was notified via Teams that referring provider is inquiring about appointment for patient with surgical oncology  No records in [facility] yet  Please follow up to obtain pathology reports and upload to [facility] so patient can be scheduled appropriately

## 2021-07-21 ENCOUNTER — TELEPHONE (OUTPATIENT)
Dept: HEMATOLOGY ONCOLOGY | Facility: CLINIC | Age: 31
End: 2021-07-21

## 2021-07-21 PROBLEM — C43.61: Status: ACTIVE | Noted: 2021-07-21

## 2021-07-21 NOTE — TELEPHONE ENCOUNTER
Patient called twice today regarding a appt being scheduled, requesting a call back to schedule today  Best call back [phone]

## 2021-07-21 NOTE — TELEPHONE ENCOUNTER
Reviewed pathology report in [facility]  OK to schedule with surgical oncology at the next soonest available appointment

## 2021-07-22 ENCOUNTER — CONSULT (OUTPATIENT)
Dept: SURGICAL ONCOLOGY | Facility: CLINIC | Age: 31
End: 2021-07-22
Payer: COMMERCIAL

## 2021-07-22 VITALS
BODY MASS INDEX: 22.57 KG/M2 | SYSTOLIC BLOOD PRESSURE: 120 MMHG | WEIGHT: 143.8 LBS | TEMPERATURE: 98.3 F | RESPIRATION RATE: 16 BRPM | HEIGHT: 67 IN | DIASTOLIC BLOOD PRESSURE: 82 MMHG | HEART RATE: 68 BPM

## 2021-07-22 DIAGNOSIS — C43.61 MALIGNANT MELANOMA OF SHOULDER, RIGHT (HCC): Primary | ICD-10-CM

## 2021-07-22 PROCEDURE — 99204 OFFICE O/P NEW MOD 45 MIN: CPT | Performed by: STUDENT IN AN ORGANIZED HEALTH CARE EDUCATION/TRAINING PROGRAM

## 2021-07-22 NOTE — LETTER
July 22, 2021     Kassie Rosario, [address]  [address] [facility]    Patient: Karsten Perez   YOB: 1990   Date of Visit: 7/22/2021       Dear Dr Kortney Mares: Thank you for referring Anshujuana Garcia to me for evaluation  Below are my notes for this consultation  If you have questions, please do not hesitate to call me  I look forward to following your patient along with you  Sincerely,        Reji Shahid MD        CC: No Recipients  Reji Shahid MD  7/22/2021 11:01 AM  Sign when Signing Visit  Surgical Oncology Consultation    [address] [facility] [address] [place] [address]  [phone]    Patient:  Kyle Rivera  1990  [id number]    Primary Care provider:  Colt Carty DO  [address]  [address] [facility] [address] [place]    Referring provider:  No referring provider defined for this encounter  Diagnoses and all orders for this visit:    Malignant melanoma of shoulder, right ([facility] [address] )  -     Case request operating room: EXCISION WIDE LESION UPPER EXTREMITY, right shoulder, BIOPSY LYMPH NODE SENTINEL; Standing  -     CBC and differential; Future  -     Comprehensive metabolic panel; Future  -     EKG 12 lead; Future  -     XR chest pa & lateral; Future  -     NM lymphatic melanoma; Future  -     Case request operating room: EXCISION WIDE LESION UPPER EXTREMITY, right shoulder, BIOPSY LYMPH NODE SENTINEL    Other orders  -     Incentive spirometry; Standing  -     Insert and maintain IV line; Standing  -     Void On-Call to O R ; Standing  -     Place sequential compression device; Standing        Chief Complaint   Patient presents with    Consult       No follow-ups on file      Oncology History   Malignant melanoma of shoulder, right ([facility] [address] )   7/8/2021 Biopsy    Right anterior shoulder:  - Ulcerated malignant melanoma, 2 5mm    Ulceration: Yes  Mitosis: >1       History of Present Illness :   This is a 29-year-old female with a new diagnosis of melanoma  She had a freckle at her right shoulder that she noticed she thinks about a year ago when she was pregnant  This began to grow and dark in color, and she sought consultation with Dermatology  They performed a shave biopsy, which noted to at least 2 5 mm in depth melanoma with ulceration with a positive deep margin  Current T stage is T3b  The patient denies any other lumps or bumps of the cervical or axillary basins on the right side  She denies any other concerning lesions  She is scheduled for full skin check with Dermatology given her new diagnosis  She does not recall any extensive sun burning, and she has dark hair and dark eyes  She currently uses sunscreen, however not every time she has sun exposure  Review of Systems  Complete ROS Surg Onc:   Constitutional: The patient denies new or recent history of general fatigue, no recent weight loss, no change in appetite  Eyes: No complaints of visual problems, no scleral icterus  ENT: No complaints of ear pain, no hoarseness, no difficulty swallowing,  no tinnitus and no new masses in head, oral cavity, or neck  Cardiovascular: No complaints of chest pain, no palpitations, no ankle edema  Respiratory: No complaints of shortness of breath, no cough  Gastrointestinal: No complaints of jaundice, no bloody stools, no pale stools  Genitourinary: No complaints of dysuria, no hematuria, no nocturia, no frequent urination, no urethral discharge  Musculoskeletal: No complaints of weakness, paralysis, joint stiffness or arthralgias  Integumentary: No complaints of rash, no new lesions  Neurological: No complaints of convulsions, no seizures, no dizziness  Hematologic/Lymphatic: No complaints of easy bruising  Endocrine:  No hot or cold intolerance  No polydipsia, polyphagia, or polyuria  Allergy/immunology:  No environmental allergies  No food allergies    Not immunocompromised  Patient Active Problem List   Diagnosis    ADD (attention deficit disorder) without hyperactivity    COVID-19    Malignant melanoma of shoulder, right ([address] )     No past medical history on file  No past surgical history on file  Family History   Problem Relation Age of Onset    No Known Problems Mother      Social History     Socioeconomic History    Marital status: Single     Spouse name: Not on file    Number of children: Not on file    Years of education: Not on file    Highest education level: Not on file   Occupational History    Not on file   Tobacco Use    Smoking status: Former Smoker     Packs/day: 0 50     Years: 1 00     Pack years: 0 50     Types: Cigarettes    Smokeless tobacco: Never Used    Tobacco comment: about 2-3 years ago    Substance and Sexual Activity    Alcohol use: Yes     Comment: Social    Drug use: No    Sexual activity: Not on file   Other Topics Concern    Not on file   Social History Narrative    Denied history of daily coffee consumption      Social Determinants of Health     Financial Resource Strain:     Difficulty of Paying Living Expenses:    Food Insecurity:     Worried About Running Out of Food in the Last Year:     Ran Out of Food in the Last Year:    Transportation Needs:     Lack of Transportation (Medical):      Lack of Transportation (Non-Medical):    Physical Activity:     Days of Exercise per Week:     Minutes of Exercise per Session:    Stress:     Feeling of Stress :    Social Connections:     Frequency of Communication with Friends and Family:     Frequency of Social Gatherings with Friends and Family:     Attends Scientologist Services:     Active Member of Clubs or Organizations:     Attends Club or Organization Meetings:     Marital Status:    Intimate Partner Violence:     Fear of Current or Ex-Partner:     Emotionally Abused:     Physically Abused:     Sexually Abused:        Current Outpatient Medications:    lisdexamfetamine (VYVANSE) 20 MG capsule, Take 1 capsule (20 mg total) by mouth every morningMax Daily Amount: 20 mg, Disp: 31 capsule, Rfl: 0    TRI-LEGEST FE 1-20/1-30/1-35 MG-MCG TABS, TAKE 1 TABLET DAILY AS DIRECTED , Disp: 140 tablet, Rfl: 2  No Known Allergies    Vitals:    07/22/21 0814   BP: 120/82   Pulse: 68   Resp: 16   Temp: 98 3 °F (36 8 °C)       Physical Exam   General: Appears well, appears stated age  Skin: Warm, anicteric  HEENT: Normocephalic, atraumatic; sclera aniceteric, mucous membranes moist; cervical nodes without adenopathy  Cardiopulmonary: RRR, Easy WOB, no BLE edema  Abd: Flat and soft, nontender, no masses appreciated, no hepatosplenomegaly  MSK: Symmetric, no cyanosis, no overt weakness  RT shoulder anterior melanoma with well-healed scab  Lymphatic: No cervical, axillary or inguinal lymphadenopathy  Neuro: Affect appropriate, no gross motor abnormalities    Pathology:  Reviewed in media    Labs: Reviewed in EPIC    Imaging  No results found  I independently reviewed and interpreted the above laboratory and imaging data  Discussion/Summary:   30 yo female with new diagnosis of intermediate thickness melanoma of right shoulder with positive deep margin  Discussed diagnosis of melanoma and explained that the patient has an intermediate thickness melanoma for which a wide local excision and sentinel lymph node biopsy is recommended  We also discussed that the true depth is unknown given that the deep margin was positive for melanoma  Discussed that any additional therapy will be guided by the final pathology and results of the LN bx  Discussed that sun protection is best prevention for melanoma and discussed ongoing sun protection  Reinforced need for continued skin surveillance with dermatology, and she does have a full skin check scheduled   Risks, benefits, alternatives and prognosis discussed in full to include bleeding, infection, wound healing complications, need for re-excision, seroma, and pt expresses understanding and endorsement  Will need to see plastics given extent of resection required and location over bone and joint  Will proceed with WLE + SLN bx with plastics closure at earliest possible date

## 2021-07-22 NOTE — H&P (VIEW-ONLY)
Surgical Oncology Consultation    [address] [facility] CANCER CARE SURGICAL ONCOLOGY Arlys James  [address] [facility] [address] [phone]    Patient:  Sri Lacy  1990  [id number]    Primary Care provider:  Isabella Mckeon DO  [address]  [address] [facility] [address] [place]    Referring provider:  No referring provider defined for this encounter  Diagnoses and all orders for this visit:    Malignant melanoma of shoulder, right ([facility] [address] )  -     Case request operating room: EXCISION WIDE LESION UPPER EXTREMITY, right shoulder, BIOPSY LYMPH NODE SENTINEL; Standing  -     CBC and differential; Future  -     Comprehensive metabolic panel; Future  -     EKG 12 lead; Future  -     XR chest pa & lateral; Future  -     NM lymphatic melanoma; Future  -     Case request operating room: EXCISION WIDE LESION UPPER EXTREMITY, right shoulder, BIOPSY LYMPH NODE SENTINEL    Other orders  -     Incentive spirometry; Standing  -     Insert and maintain IV line; Standing  -     Void On-Call to O R ; Standing  -     Place sequential compression device; Standing        Chief Complaint   Patient presents with    Consult       No follow-ups on file  Oncology History   Malignant melanoma of shoulder, right ([facility] [address] )   7/8/2021 Biopsy    Right anterior shoulder:  - Ulcerated malignant melanoma, 2 5mm    Ulceration: Yes  Mitosis: >1       History of Present Illness  : This is a 65-year-old female with a new diagnosis of melanoma  She had a freckle at her right shoulder that she noticed she thinks about a year ago when she was pregnant  This began to grow and dark in color, and she sought consultation with Dermatology  They performed a shave biopsy, which noted to at least 2 5 mm in depth melanoma with ulceration with a positive deep margin  Current T stage is T3b  The patient denies any other lumps or bumps of the cervical or axillary basins on the right side  She denies any other concerning lesions    She is scheduled for full skin check with Dermatology given her new diagnosis  She does not recall any extensive sun burning, and she has dark hair and dark eyes  She currently uses sunscreen, however not every time she has sun exposure  Review of Systems  Complete ROS Surg Onc:   Constitutional: The patient denies new or recent history of general fatigue, no recent weight loss, no change in appetite  Eyes: No complaints of visual problems, no scleral icterus  ENT: No complaints of ear pain, no hoarseness, no difficulty swallowing,  no tinnitus and no new masses in head, oral cavity, or neck  Cardiovascular: No complaints of chest pain, no palpitations, no ankle edema  Respiratory: No complaints of shortness of breath, no cough  Gastrointestinal: No complaints of jaundice, no bloody stools, no pale stools  Genitourinary: No complaints of dysuria, no hematuria, no nocturia, no frequent urination, no urethral discharge  Musculoskeletal: No complaints of weakness, paralysis, joint stiffness or arthralgias  Integumentary: No complaints of rash, no new lesions  Neurological: No complaints of convulsions, no seizures, no dizziness  Hematologic/Lymphatic: No complaints of easy bruising  Endocrine:  No hot or cold intolerance  No polydipsia, polyphagia, or polyuria  Allergy/immunology:  No environmental allergies  No food allergies  Not immunocompromised  Patient Active Problem List   Diagnosis    ADD (attention deficit disorder) without hyperactivity    COVID-19    Malignant melanoma of shoulder, right ([address] )     No past medical history on file  No past surgical history on file    Family History   Problem Relation Age of Onset    No Known Problems Mother      Social History     Socioeconomic History    Marital status: Single     Spouse name: Not on file    Number of children: Not on file    Years of education: Not on file    Highest education level: Not on file   Occupational History  Not on file   Tobacco Use    Smoking status: Former Smoker     Packs/day: 0 50     Years: 1 00     Pack years: 0 50     Types: Cigarettes    Smokeless tobacco: Never Used    Tobacco comment: about 2-3 years ago    Substance and Sexual Activity    Alcohol use: Yes     Comment: Social    Drug use: No    Sexual activity: Not on file   Other Topics Concern    Not on file   Social History Narrative    Denied history of daily coffee consumption      Social Determinants of Health     Financial Resource Strain:     Difficulty of Paying Living Expenses:    Food Insecurity:     Worried About Running Out of Food in the Last Year:     Ran Out of Food in the Last Year:    Transportation Needs:     Lack of Transportation (Medical):      Lack of Transportation (Non-Medical):    Physical Activity:     Days of Exercise per Week:     Minutes of Exercise per Session:    Stress:     Feeling of Stress :    Social Connections:     Frequency of Communication with Friends and Family:     Frequency of Social Gatherings with Friends and Family:     Attends Gnosticist Services:     Active Member of Clubs or Organizations:     Attends Club or Organization Meetings:     Marital Status:    Intimate Partner Violence:     Fear of Current or Ex-Partner:     Emotionally Abused:     Physically Abused:     Sexually Abused:        Current Outpatient Medications:     lisdexamfetamine (VYVANSE) 20 MG capsule, Take 1 capsule (20 mg total) by mouth every morningMax Daily Amount: 20 mg, Disp: 31 capsule, Rfl: 0    TRI-LEGEST FE 1-20/1-30/1-35 MG-MCG TABS, TAKE 1 TABLET DAILY AS DIRECTED , Disp: 140 tablet, Rfl: 2  No Known Allergies    Vitals:    07/22/21 0814   BP: 120/82   Pulse: 68   Resp: 16   Temp: 98 3 °F (36 8 °C)       Physical Exam   General: Appears well, appears stated age  Skin: Warm, anicteric  HEENT: Normocephalic, atraumatic; sclera aniceteric, mucous membranes moist; cervical nodes without adenopathy  Cardiopulmonary: RRR, Easy WOB, no BLE edema  Abd: Flat and soft, nontender, no masses appreciated, no hepatosplenomegaly  MSK: Symmetric, no cyanosis, no overt weakness  RT shoulder anterior melanoma with well-healed scab  Lymphatic: No cervical, axillary or inguinal lymphadenopathy  Neuro: Affect appropriate, no gross motor abnormalities    Pathology:  Reviewed in media    Labs: Reviewed in EPIC    Imaging  No results found  I independently reviewed and interpreted the above laboratory and imaging data  Discussion/Summary:   32 yo female with new diagnosis of intermediate thickness melanoma of right shoulder with positive deep margin  Discussed diagnosis of melanoma and explained that the patient has an intermediate thickness melanoma for which a wide local excision and sentinel lymph node biopsy is recommended  We also discussed that the true depth is unknown given that the deep margin was positive for melanoma  Discussed that any additional therapy will be guided by the final pathology and results of the LN bx  Discussed that sun protection is best prevention for melanoma and discussed ongoing sun protection  Reinforced need for continued skin surveillance with dermatology, and she does have a full skin check scheduled  Risks, benefits, alternatives and prognosis discussed in full to include bleeding, infection, wound healing complications, need for re-excision, seroma, and pt expresses understanding and endorsement  Will need to see plastics given extent of resection required and location over bone and joint  Will proceed with WLE + SLN bx with plastics closure at earliest possible date

## 2021-07-22 NOTE — PROGRESS NOTES
Surgical Oncology Consultation    [address] [facility] CANCER CARE SURGICAL ONCOLOGY Ewelina López  [address] [facility] [id number] [phone]    Patient:  Concetta Mccarthy  1990  [id number]    Primary Care provider:  Ricardo Cao DO  [address]  [address] [facility] [address] [place]    Referring provider:  No referring provider defined for this encounter  Diagnoses and all orders for this visit:    Malignant melanoma of shoulder, right ([facility] [address] )  -     Case request operating room: EXCISION WIDE LESION UPPER EXTREMITY, right shoulder, BIOPSY LYMPH NODE SENTINEL; Standing  -     CBC and differential; Future  -     Comprehensive metabolic panel; Future  -     EKG 12 lead; Future  -     XR chest pa & lateral; Future  -     NM lymphatic melanoma; Future  -     Case request operating room: EXCISION WIDE LESION UPPER EXTREMITY, right shoulder, BIOPSY LYMPH NODE SENTINEL    Other orders  -     Incentive spirometry; Standing  -     Insert and maintain IV line; Standing  -     Void On-Call to O R ; Standing  -     Place sequential compression device; Standing        Chief Complaint   Patient presents with    Consult       No follow-ups on file  Oncology History   Malignant melanoma of shoulder, right ([facility] [address] )   7/8/2021 Biopsy    Right anterior shoulder:  - Ulcerated malignant melanoma, 2 5mm    Ulceration: Yes  Mitosis: >1       History of Present Illness  : This is a 70-year-old female with a new diagnosis of melanoma  She had a freckle at her right shoulder that she noticed she thinks about a year ago when she was pregnant  This began to grow and dark in color, and she sought consultation with Dermatology  They performed a shave biopsy, which noted to at least 2 5 mm in depth melanoma with ulceration with a positive deep margin  Current T stage is T3b  The patient denies any other lumps or bumps of the cervical or axillary basins on the right side  She denies any other concerning lesions    She is scheduled for full skin check with Dermatology given her new diagnosis  She does not recall any extensive sun burning, and she has dark hair and dark eyes  She currently uses sunscreen, however not every time she has sun exposure  Review of Systems  Complete ROS Surg Onc:   Constitutional: The patient denies new or recent history of general fatigue, no recent weight loss, no change in appetite  Eyes: No complaints of visual problems, no scleral icterus  ENT: No complaints of ear pain, no hoarseness, no difficulty swallowing,  no tinnitus and no new masses in head, oral cavity, or neck  Cardiovascular: No complaints of chest pain, no palpitations, no ankle edema  Respiratory: No complaints of shortness of breath, no cough  Gastrointestinal: No complaints of jaundice, no bloody stools, no pale stools  Genitourinary: No complaints of dysuria, no hematuria, no nocturia, no frequent urination, no urethral discharge  Musculoskeletal: No complaints of weakness, paralysis, joint stiffness or arthralgias  Integumentary: No complaints of rash, no new lesions  Neurological: No complaints of convulsions, no seizures, no dizziness  Hematologic/Lymphatic: No complaints of easy bruising  Endocrine:  No hot or cold intolerance  No polydipsia, polyphagia, or polyuria  Allergy/immunology:  No environmental allergies  No food allergies  Not immunocompromised  Patient Active Problem List   Diagnosis    ADD (attention deficit disorder) without hyperactivity    COVID-19    Malignant melanoma of shoulder, right ([address] )     No past medical history on file  No past surgical history on file    Family History   Problem Relation Age of Onset    No Known Problems Mother      Social History     Socioeconomic History    Marital status: Single     Spouse name: Not on file    Number of children: Not on file    Years of education: Not on file    Highest education level: Not on file   Occupational History  Not on file   Tobacco Use    Smoking status: Former Smoker     Packs/day: 0 50     Years: 1 00     Pack years: 0 50     Types: Cigarettes    Smokeless tobacco: Never Used    Tobacco comment: about 2-3 years ago    Substance and Sexual Activity    Alcohol use: Yes     Comment: Social    Drug use: No    Sexual activity: Not on file   Other Topics Concern    Not on file   Social History Narrative    Denied history of daily coffee consumption      Social Determinants of Health     Financial Resource Strain:     Difficulty of Paying Living Expenses:    Food Insecurity:     Worried About Running Out of Food in the Last Year:     Ran Out of Food in the Last Year:    Transportation Needs:     Lack of Transportation (Medical):      Lack of Transportation (Non-Medical):    Physical Activity:     Days of Exercise per Week:     Minutes of Exercise per Session:    Stress:     Feeling of Stress :    Social Connections:     Frequency of Communication with Friends and Family:     Frequency of Social Gatherings with Friends and Family:     Attends Rastafarian Services:     Active Member of Clubs or Organizations:     Attends Club or Organization Meetings:     Marital Status:    Intimate Partner Violence:     Fear of Current or Ex-Partner:     Emotionally Abused:     Physically Abused:     Sexually Abused:        Current Outpatient Medications:     lisdexamfetamine (VYVANSE) 20 MG capsule, Take 1 capsule (20 mg total) by mouth every morningMax Daily Amount: 20 mg, Disp: 31 capsule, Rfl: 0    TRI-LEGEST FE 1-20/1-30/1-35 MG-MCG TABS, TAKE 1 TABLET DAILY AS DIRECTED , Disp: 140 tablet, Rfl: 2  No Known Allergies    Vitals:    07/22/21 0814   BP: 120/82   Pulse: 68   Resp: 16   Temp: 98 3 °F (36 8 °C)       Physical Exam   General: Appears well, appears stated age  Skin: Warm, anicteric  HEENT: Normocephalic, atraumatic; sclera aniceteric, mucous membranes moist; cervical nodes without adenopathy  Cardiopulmonary: RRR, Easy WOB, no BLE edema  Abd: Flat and soft, nontender, no masses appreciated, no hepatosplenomegaly  MSK: Symmetric, no cyanosis, no overt weakness  RT shoulder anterior melanoma with well-healed scab  Lymphatic: No cervical, axillary or inguinal lymphadenopathy  Neuro: Affect appropriate, no gross motor abnormalities    Pathology:  Reviewed in media    Labs: Reviewed in EPIC    Imaging  No results found  I independently reviewed and interpreted the above laboratory and imaging data  Discussion/Summary:   30 yo female with new diagnosis of intermediate thickness melanoma of right shoulder with positive deep margin  Discussed diagnosis of melanoma and explained that the patient has an intermediate thickness melanoma for which a wide local excision and sentinel lymph node biopsy is recommended  We also discussed that the true depth is unknown given that the deep margin was positive for melanoma  Discussed that any additional therapy will be guided by the final pathology and results of the LN bx  Discussed that sun protection is best prevention for melanoma and discussed ongoing sun protection  Reinforced need for continued skin surveillance with dermatology, and she does have a full skin check scheduled  Risks, benefits, alternatives and prognosis discussed in full to include bleeding, infection, wound healing complications, need for re-excision, seroma, and pt expresses understanding and endorsement  Will need to see plastics given extent of resection required and location over bone and joint  Will proceed with WLE + SLN bx with plastics closure at earliest possible date

## 2021-07-27 ENCOUNTER — TELEPHONE (OUTPATIENT)
Dept: SURGICAL ONCOLOGY | Facility: CLINIC | Age: 31
End: 2021-07-27

## 2021-07-27 NOTE — TELEPHONE ENCOUNTER
Left voicemail, and also sent email, offering pt a surgery date of 8/2 with Dr Sheila Dotson  Advised her to call back asap to let us know whether or not that date will work for her

## 2021-07-28 ENCOUNTER — TELEPHONE (OUTPATIENT)
Dept: SURGICAL ONCOLOGY | Facility: CLINIC | Age: 31
End: 2021-07-28

## 2021-07-28 NOTE — TELEPHONE ENCOUNTER
Spoke to pt regarding potential surgery date of 8/2  She declined, states she is going on a trip to [place]  Any date after 8/8 will suit her  I explained that we will continue looking to find a date that works for both surgeons

## 2021-07-30 ENCOUNTER — CONSULT (OUTPATIENT)
Dept: PLASTIC SURGERY | Facility: CLINIC | Age: 31
End: 2021-07-30
Payer: COMMERCIAL

## 2021-07-30 VITALS
SYSTOLIC BLOOD PRESSURE: 121 MMHG | HEART RATE: 65 BPM | HEIGHT: 67 IN | DIASTOLIC BLOOD PRESSURE: 77 MMHG | TEMPERATURE: 97.5 F | WEIGHT: 145.38 LBS | BODY MASS INDEX: 22.82 KG/M2

## 2021-07-30 DIAGNOSIS — C43.61 MALIGNANT MELANOMA OF SHOULDER, RIGHT (HCC): ICD-10-CM

## 2021-07-30 PROCEDURE — 3008F BODY MASS INDEX DOCD: CPT | Performed by: PLASTIC SURGERY

## 2021-07-30 PROCEDURE — 99203 OFFICE O/P NEW LOW 30 MIN: CPT | Performed by: PLASTIC SURGERY

## 2021-07-30 PROCEDURE — 1036F TOBACCO NON-USER: CPT | Performed by: PLASTIC SURGERY

## 2021-07-30 RX ORDER — CALCIUM CARBONATE 500(1250)
1 TABLET ORAL DAILY
COMMUNITY
End: 2021-11-17

## 2021-07-30 RX ORDER — ACETAMINOPHEN AND CODEINE PHOSPHATE 120; 12 MG/5ML; MG/5ML
0.35 SOLUTION ORAL DAILY
COMMUNITY
Start: 2021-07-21 | End: 2022-07-21

## 2021-07-30 NOTE — H&P (VIEW-ONLY)
Assessment/Plan:      Diagnoses and all orders for this visit:    Malignant melanoma of shoulder, right ([address] )  -     Ambulatory referral to Plastic Surgery    Other orders  -     Prenatal Vit-Fe Fumarate-FA ([employer]Sense Prenatal Vitamins) 28-0 8 MG TABS; Take 1 tablet by mouth daily  -     norethindrone (MICRONOR) 0 35 MG tablet; Take 0 35 mg by mouth daily        I discussed that I felt that this lesion is: malignant     I discussed excision of the lesion(s) with the patient as recommended by her surgical oncologist  Reconstruction options discussed included complex closure vs skin graft vs adjacent tissue transfer    I discussed that this could be performed as outpatient surgery     I discussed risks including but not limited to: bleeding, infection, hematoma, seroma, wound breakdown, scar, need for further surgery, deformity, pain, numbness, weakness, asymmetry, injury to structures, and medical complications  I discussed that the scar involved would be larger than the maximal dimension of the lesion itself  The patient would like to proceed and therefore we will coordinate a surgery date  A total of 30 minutes of face-to-face time was spent in this encounter, of which >50% was spent in counseling  Gadiel [address]   Adonis Santiago [facility], [address]   Office: [phone]          Subjective:     Patient ID: Jodi Mendoza is a 31 y o  female  Mrs Quinton Nolen is a 31years old female who presents for evaluation of a skin lesion  Location: Right shoulder   Duration: 1 year   Symptoms: lesion progressively enlarged and change in color, prompting medical attention  Biopsy performed by dermatology showed Melanoma at least 2 5 mm in depth with  Ulcerative characteristics  She has been evaluated by surgical oncologist and sentinel lymph node with wide local excision was recommended   I was asked to assist with anticipated secondary defect  She denies prior history of cancer  Review of Systems      Objective:     Physical Exam  Constitutional:       Appearance: Normal appearance  HENT:      Head: Normocephalic  Nose: Nose normal    Eyes:      Extraocular Movements: Extraocular movements intact  Pupils: Pupils are equal, round, and reactive to light  Cardiovascular:      Rate and Rhythm: Normal rate  Pulmonary:      Effort: Pulmonary effort is normal    Abdominal:      Palpations: Abdomen is soft  Musculoskeletal:         General: Normal range of motion  Cervical back: Normal range of motion  Skin:         Neurological:      General: No focal deficit present  Mental Status: She is alert and oriented to person, place, and time     Psychiatric:         Mood and Affect: Mood normal

## 2021-07-30 NOTE — PROGRESS NOTES
Assessment/Plan:      Diagnoses and all orders for this visit:    Malignant melanoma of shoulder, right ([address] )  -     Ambulatory referral to Plastic Surgery    Other orders  -     Prenatal Vit-Fe Fumarate-FA (GoodSense Prenatal Vitamins) 28-0 8 MG TABS; Take 1 tablet by mouth daily  -     norethindrone (MICRONOR) 0 35 MG tablet; Take 0 35 mg by mouth daily        I discussed that I felt that this lesion is: malignant     I discussed excision of the lesion(s) with the patient as recommended by her surgical oncologist  Reconstruction options discussed included complex closure vs skin graft vs adjacent tissue transfer    I discussed that this could be performed as outpatient surgery     I discussed risks including but not limited to: bleeding, infection, hematoma, seroma, wound breakdown, scar, need for further surgery, deformity, pain, numbness, weakness, asymmetry, injury to structures, and medical complications  I discussed that the scar involved would be larger than the maximal dimension of the lesion itself  The patient would like to proceed and therefore we will coordinate a surgery date  A total of 30 minutes of face-to-face time was spent in this encounter, of which >50% was spent in counseling  Maria D Coles    [facility] Santiago [facility], [address]   Office: [phone]          Subjective:     Patient ID: Cait Deras is a 31 y o  female  Mrs Charity Whatley is a 31years old female who presents for evaluation of a skin lesion  Location: Right shoulder   Duration: 1 year   Symptoms: lesion progressively enlarged and change in color, prompting medical attention  Biopsy performed by dermatology showed Melanoma at least 2 5 mm in depth with  Ulcerative characteristics  She has been evaluated by surgical oncologist and sentinel lymph node with wide local excision was recommended   I was asked to assist with anticipated secondary defect  She denies prior history of cancer  Review of Systems      Objective:     Physical Exam  Constitutional:       Appearance: Normal appearance  HENT:      Head: Normocephalic  Nose: Nose normal    Eyes:      Extraocular Movements: Extraocular movements intact  Pupils: Pupils are equal, round, and reactive to light  Cardiovascular:      Rate and Rhythm: Normal rate  Pulmonary:      Effort: Pulmonary effort is normal    Abdominal:      Palpations: Abdomen is soft  Musculoskeletal:         General: Normal range of motion  Cervical back: Normal range of motion  Skin:         Neurological:      General: No focal deficit present  Mental Status: She is alert and oriented to person, place, and time     Psychiatric:         Mood and Affect: Mood normal

## 2021-08-01 ENCOUNTER — CLINICAL SUPPORT (OUTPATIENT)
Dept: URGENT CARE | Facility: MEDICAL CENTER | Age: 31
End: 2021-08-01
Payer: COMMERCIAL

## 2021-08-01 ENCOUNTER — APPOINTMENT (OUTPATIENT)
Dept: RADIOLOGY | Facility: MEDICAL CENTER | Age: 31
End: 2021-08-01
Payer: COMMERCIAL

## 2021-08-01 DIAGNOSIS — C43.61 MALIGNANT MELANOMA OF SHOULDER, RIGHT (HCC): ICD-10-CM

## 2021-08-01 LAB
ATRIAL RATE: 60 BPM
P AXIS: 61 DEGREES
PR INTERVAL: 136 MS
QRS AXIS: 59 DEGREES
QRSD INTERVAL: 78 MS
QT INTERVAL: 426 MS
QTC INTERVAL: 426 MS
T WAVE AXIS: 35 DEGREES
VENTRICULAR RATE: 60 BPM

## 2021-08-01 PROCEDURE — 71046 X-RAY EXAM CHEST 2 VIEWS: CPT

## 2021-08-01 PROCEDURE — 93010 ELECTROCARDIOGRAM REPORT: CPT

## 2021-08-01 PROCEDURE — 93005 ELECTROCARDIOGRAM TRACING: CPT

## 2021-08-10 NOTE — PRE-PROCEDURE INSTRUCTIONS
Pre-Surgery Instructions:   Medication Instructions    norethindrone (MICRONOR) 0 35 MG tablet Instructed patient per Anesthesia Guidelines  Take morning of surgery with sip water    Prenatal Vit-Fe Fumarate-FA (GoodSense Prenatal Vitamins) 28-0 8 MG TABS Instructed patient per Anesthesia Guidelines  Stop 8/11   Covid screening negative as per patient  Reviewed pre op medicine and showering instructions with patient via phone call, verbalizes understanding  Advised patient to stop taking non prescribed vitamins, herbal meds, NSAID'S and ASA pre op but may take Tylenol products if needed  Advised NPO after MN (8/17) and ASC will call (8/17) with surgical scheduled time  Pt verbalized understanding

## 2021-08-17 ENCOUNTER — ANESTHESIA EVENT (OUTPATIENT)
Dept: PERIOP | Facility: HOSPITAL | Age: 31
End: 2021-08-17
Payer: COMMERCIAL

## 2021-08-17 PROCEDURE — NC001 PR NO CHARGE: Performed by: PHYSICIAN ASSISTANT

## 2021-08-18 ENCOUNTER — HOSPITAL ENCOUNTER (OUTPATIENT)
Dept: RADIOLOGY | Facility: HOSPITAL | Age: 31
Discharge: HOME | End: 2021-08-18
Attending: STUDENT IN AN ORGANIZED HEALTH CARE EDUCATION/TRAINING PROGRAM
Payer: COMMERCIAL

## 2021-08-18 ENCOUNTER — HOSPITAL ENCOUNTER (OUTPATIENT)
Facility: HOSPITAL | Age: 31
Setting detail: OUTPATIENT SURGERY
Discharge: HOME | End: 2021-08-18
Attending: STUDENT IN AN ORGANIZED HEALTH CARE EDUCATION/TRAINING PROGRAM | Admitting: STUDENT IN AN ORGANIZED HEALTH CARE EDUCATION/TRAINING PROGRAM
Payer: COMMERCIAL

## 2021-08-18 ENCOUNTER — ANESTHESIA (OUTPATIENT)
Dept: PERIOP | Facility: HOSPITAL | Age: 31
End: 2021-08-18
Payer: COMMERCIAL

## 2021-08-18 VITALS
HEIGHT: 67 IN | RESPIRATION RATE: 16 BRPM | TEMPERATURE: 98 F | OXYGEN SATURATION: 99 % | HEART RATE: 61 BPM | SYSTOLIC BLOOD PRESSURE: 126 MMHG | WEIGHT: 145 LBS | BODY MASS INDEX: 22.76 KG/M2 | DIASTOLIC BLOOD PRESSURE: 76 MMHG

## 2021-08-18 DIAGNOSIS — C43.61 MALIGNANT MELANOMA OF SHOULDER, RIGHT (HCC): Primary | ICD-10-CM

## 2021-08-18 DIAGNOSIS — C43.61 MALIGNANT MELANOMA OF SHOULDER, RIGHT (HCC): ICD-10-CM

## 2021-08-18 LAB
EXT PREGNANCY TEST URINE: NEGATIVE
EXT. CONTROL: NORMAL

## 2021-08-18 PROCEDURE — 88305 TISSUE EXAM BY PATHOLOGIST: CPT | Performed by: PATHOLOGY

## 2021-08-18 PROCEDURE — 88342 IMHCHEM/IMCYTCHM 1ST ANTB: CPT | Performed by: PATHOLOGY

## 2021-08-18 PROCEDURE — 12032 INTMD RPR S/A/T/EXT 2.6-7.5: CPT | Performed by: PLASTIC SURGERY

## 2021-08-18 PROCEDURE — 88307 TISSUE EXAM BY PATHOLOGIST: CPT | Performed by: PATHOLOGY

## 2021-08-18 PROCEDURE — 88341 IMHCHEM/IMCYTCHM EA ADD ANTB: CPT | Performed by: PATHOLOGY

## 2021-08-18 PROCEDURE — 11606 EXC TR-EXT MAL+MARG >4 CM: CPT | Performed by: STUDENT IN AN ORGANIZED HEALTH CARE EDUCATION/TRAINING PROGRAM

## 2021-08-18 PROCEDURE — 12032 INTMD RPR S/A/T/EXT 2.6-7.5: CPT | Performed by: PHYSICIAN ASSISTANT

## 2021-08-18 PROCEDURE — A9541 TC99M SULFUR COLLOID: HCPCS

## 2021-08-18 PROCEDURE — 78195 LYMPH SYSTEM IMAGING: CPT

## 2021-08-18 PROCEDURE — 81025 URINE PREGNANCY TEST: CPT | Performed by: STUDENT IN AN ORGANIZED HEALTH CARE EDUCATION/TRAINING PROGRAM

## 2021-08-18 PROCEDURE — G1004 CDSM NDSC: HCPCS

## 2021-08-18 PROCEDURE — 38510 BIOPSY/REMOVAL LYMPH NODES: CPT | Performed by: STUDENT IN AN ORGANIZED HEALTH CARE EDUCATION/TRAINING PROGRAM

## 2021-08-18 RX ORDER — LIDOCAINE HYDROCHLORIDE 10 MG/ML
INJECTION, SOLUTION EPIDURAL; INFILTRATION; INTRACAUDAL; PERINEURAL AS NEEDED
Status: DISCONTINUED | OUTPATIENT
Start: 2021-08-18 | End: 2021-08-18

## 2021-08-18 RX ORDER — KETAMINE HCL IN NACL, ISO-OSM 100MG/10ML
SYRINGE (ML) INJECTION AS NEEDED
Status: DISCONTINUED | OUTPATIENT
Start: 2021-08-18 | End: 2021-08-18

## 2021-08-18 RX ORDER — OXYCODONE HYDROCHLORIDE 5 MG/1
5 TABLET ORAL ONCE AS NEEDED
Status: COMPLETED | OUTPATIENT
Start: 2021-08-18 | End: 2021-08-18

## 2021-08-18 RX ORDER — FENTANYL CITRATE 50 UG/ML
INJECTION, SOLUTION INTRAMUSCULAR; INTRAVENOUS AS NEEDED
Status: DISCONTINUED | OUTPATIENT
Start: 2021-08-18 | End: 2021-08-18

## 2021-08-18 RX ORDER — FENTANYL CITRATE/PF 50 MCG/ML
50 SYRINGE (ML) INJECTION
Status: DISCONTINUED | OUTPATIENT
Start: 2021-08-18 | End: 2021-08-18 | Stop reason: HOSPADM

## 2021-08-18 RX ORDER — ISOSULFAN BLUE 50 MG/5ML
INJECTION, SOLUTION SUBCUTANEOUS AS NEEDED
Status: DISCONTINUED | OUTPATIENT
Start: 2021-08-18 | End: 2021-08-18 | Stop reason: HOSPADM

## 2021-08-18 RX ORDER — IBUPROFEN 600 MG/1
600 TABLET ORAL
Qty: 30 TABLET | Refills: 0 | Status: SHIPPED | OUTPATIENT
Start: 2021-08-18 | End: 2021-09-20

## 2021-08-18 RX ORDER — ALBUTEROL SULFATE 2.5 MG/3ML
2.5 SOLUTION RESPIRATORY (INHALATION) ONCE AS NEEDED
Status: DISCONTINUED | OUTPATIENT
Start: 2021-08-18 | End: 2021-08-18 | Stop reason: HOSPADM

## 2021-08-18 RX ORDER — METOCLOPRAMIDE HYDROCHLORIDE 5 MG/ML
10 INJECTION INTRAMUSCULAR; INTRAVENOUS ONCE AS NEEDED
Status: DISCONTINUED | OUTPATIENT
Start: 2021-08-18 | End: 2021-08-18 | Stop reason: HOSPADM

## 2021-08-18 RX ORDER — NEOSTIGMINE METHYLSULFATE 1 MG/ML
INJECTION INTRAVENOUS AS NEEDED
Status: DISCONTINUED | OUTPATIENT
Start: 2021-08-18 | End: 2021-08-18

## 2021-08-18 RX ORDER — ONDANSETRON 2 MG/ML
INJECTION INTRAMUSCULAR; INTRAVENOUS AS NEEDED
Status: DISCONTINUED | OUTPATIENT
Start: 2021-08-18 | End: 2021-08-18

## 2021-08-18 RX ORDER — SODIUM CHLORIDE, SODIUM LACTATE, POTASSIUM CHLORIDE, CALCIUM CHLORIDE 600; 310; 30; 20 MG/100ML; MG/100ML; MG/100ML; MG/100ML
50 INJECTION, SOLUTION INTRAVENOUS CONTINUOUS
Status: DISCONTINUED | OUTPATIENT
Start: 2021-08-18 | End: 2021-08-18 | Stop reason: HOSPADM

## 2021-08-18 RX ORDER — CEFAZOLIN SODIUM 1 G/50ML
1000 SOLUTION INTRAVENOUS ONCE
Status: COMPLETED | OUTPATIENT
Start: 2021-08-18 | End: 2021-08-18

## 2021-08-18 RX ORDER — MIDAZOLAM HYDROCHLORIDE 2 MG/2ML
INJECTION, SOLUTION INTRAMUSCULAR; INTRAVENOUS AS NEEDED
Status: DISCONTINUED | OUTPATIENT
Start: 2021-08-18 | End: 2021-08-18

## 2021-08-18 RX ORDER — DEXAMETHASONE SODIUM PHOSPHATE 10 MG/ML
INJECTION, SOLUTION INTRAMUSCULAR; INTRAVENOUS AS NEEDED
Status: DISCONTINUED | OUTPATIENT
Start: 2021-08-18 | End: 2021-08-18

## 2021-08-18 RX ORDER — METOCLOPRAMIDE HYDROCHLORIDE 5 MG/ML
INJECTION INTRAMUSCULAR; INTRAVENOUS AS NEEDED
Status: DISCONTINUED | OUTPATIENT
Start: 2021-08-18 | End: 2021-08-18

## 2021-08-18 RX ORDER — ACETAMINOPHEN 500 MG
500 TABLET ORAL 3 TIMES DAILY
Qty: 30 TABLET | Refills: 0 | Status: SHIPPED | OUTPATIENT
Start: 2021-08-18 | End: 2021-08-28

## 2021-08-18 RX ORDER — PROPOFOL 10 MG/ML
INJECTION, EMULSION INTRAVENOUS AS NEEDED
Status: DISCONTINUED | OUTPATIENT
Start: 2021-08-18 | End: 2021-08-18

## 2021-08-18 RX ORDER — OXYCODONE HYDROCHLORIDE 5 MG/1
5 TABLET ORAL EVERY 8 HOURS PRN
Qty: 10 TABLET | Refills: 0 | Status: SHIPPED | OUTPATIENT
Start: 2021-08-18 | End: 2021-08-28

## 2021-08-18 RX ORDER — GLYCOPYRROLATE 0.2 MG/ML
INJECTION INTRAMUSCULAR; INTRAVENOUS AS NEEDED
Status: DISCONTINUED | OUTPATIENT
Start: 2021-08-18 | End: 2021-08-18

## 2021-08-18 RX ORDER — ACETAMINOPHEN 325 MG/1
975 TABLET ORAL ONCE
Status: DISCONTINUED | OUTPATIENT
Start: 2021-08-18 | End: 2021-08-18 | Stop reason: HOSPADM

## 2021-08-18 RX ORDER — SODIUM CHLORIDE, SODIUM LACTATE, POTASSIUM CHLORIDE, CALCIUM CHLORIDE 600; 310; 30; 20 MG/100ML; MG/100ML; MG/100ML; MG/100ML
INJECTION, SOLUTION INTRAVENOUS CONTINUOUS PRN
Status: DISCONTINUED | OUTPATIENT
Start: 2021-08-18 | End: 2021-08-18

## 2021-08-18 RX ORDER — HYDROMORPHONE HCL/PF 1 MG/ML
0.5 SYRINGE (ML) INJECTION
Status: DISCONTINUED | OUTPATIENT
Start: 2021-08-18 | End: 2021-08-18 | Stop reason: HOSPADM

## 2021-08-18 RX ORDER — ROCURONIUM BROMIDE 10 MG/ML
INJECTION, SOLUTION INTRAVENOUS AS NEEDED
Status: DISCONTINUED | OUTPATIENT
Start: 2021-08-18 | End: 2021-08-18

## 2021-08-18 RX ORDER — LIDOCAINE HYDROCHLORIDE AND EPINEPHRINE 10; 10 MG/ML; UG/ML
INJECTION, SOLUTION INFILTRATION; PERINEURAL AS NEEDED
Status: DISCONTINUED | OUTPATIENT
Start: 2021-08-18 | End: 2021-08-18 | Stop reason: HOSPADM

## 2021-08-18 RX ORDER — GABAPENTIN 300 MG/1
300 CAPSULE ORAL ONCE
Status: DISCONTINUED | OUTPATIENT
Start: 2021-08-18 | End: 2021-08-18 | Stop reason: HOSPADM

## 2021-08-18 RX ORDER — ONDANSETRON 2 MG/ML
4 INJECTION INTRAMUSCULAR; INTRAVENOUS ONCE AS NEEDED
Status: DISCONTINUED | OUTPATIENT
Start: 2021-08-18 | End: 2021-08-18 | Stop reason: HOSPADM

## 2021-08-18 RX ADMIN — FENTANYL CITRATE 50 MCG: 50 INJECTION INTRAMUSCULAR; INTRAVENOUS at 10:54

## 2021-08-18 RX ADMIN — CEFAZOLIN SODIUM 1000 MG: 1 SOLUTION INTRAVENOUS at 08:25

## 2021-08-18 RX ADMIN — OXYCODONE HYDROCHLORIDE 5 MG: 5 TABLET ORAL at 12:38

## 2021-08-18 RX ADMIN — Medication 30 MG: at 08:34

## 2021-08-18 RX ADMIN — MIDAZOLAM 2 MG: 1 INJECTION INTRAMUSCULAR; INTRAVENOUS at 08:24

## 2021-08-18 RX ADMIN — PROPOFOL 50 MG: 10 INJECTION, EMULSION INTRAVENOUS at 08:36

## 2021-08-18 RX ADMIN — GLYCOPYRROLATE 0.4 MG: 0.2 INJECTION, SOLUTION INTRAMUSCULAR; INTRAVENOUS at 10:39

## 2021-08-18 RX ADMIN — LIDOCAINE HYDROCHLORIDE 50 MG: 10 INJECTION, SOLUTION EPIDURAL; INFILTRATION; INTRACAUDAL; PERINEURAL at 08:35

## 2021-08-18 RX ADMIN — DEXAMETHASONE SODIUM PHOSPHATE 10 MG: 10 INJECTION, SOLUTION INTRAMUSCULAR; INTRAVENOUS at 08:36

## 2021-08-18 RX ADMIN — Medication 10 MG: at 08:36

## 2021-08-18 RX ADMIN — METOCLOPRAMIDE HYDROCHLORIDE 10 MG: 5 INJECTION INTRAMUSCULAR; INTRAVENOUS at 10:39

## 2021-08-18 RX ADMIN — Medication 50 MCG: at 11:15

## 2021-08-18 RX ADMIN — FENTANYL CITRATE 50 MCG: 50 INJECTION INTRAMUSCULAR; INTRAVENOUS at 08:34

## 2021-08-18 RX ADMIN — ONDANSETRON 4 MG: 2 INJECTION INTRAMUSCULAR; INTRAVENOUS at 10:39

## 2021-08-18 RX ADMIN — Medication 10 MG: at 08:57

## 2021-08-18 RX ADMIN — PHENYLEPHRINE HYDROCHLORIDE 20 MCG/MIN: 10 INJECTION INTRAVENOUS at 08:40

## 2021-08-18 RX ADMIN — ROCURONIUM BROMIDE 50 MG: 50 INJECTION, SOLUTION INTRAVENOUS at 08:36

## 2021-08-18 RX ADMIN — NEOSTIGMINE METHYLSULFATE 3 MG: 1 INJECTION INTRAVENOUS at 10:39

## 2021-08-18 RX ADMIN — PROPOFOL 150 MG: 10 INJECTION, EMULSION INTRAVENOUS at 08:35

## 2021-08-18 RX ADMIN — SODIUM CHLORIDE, SODIUM LACTATE, POTASSIUM CHLORIDE, AND CALCIUM CHLORIDE: .6; .31; .03; .02 INJECTION, SOLUTION INTRAVENOUS at 08:15

## 2021-08-18 NOTE — INTERVAL H&P NOTE
H&P reviewed  After examining the patient I find no changes in the patients condition since the H&P had been written      Vitals:    08/18/21 0611   BP: 117/70   Pulse: 65   Resp: 17   Temp: (!) 97 3 °F (36 3 °C)   SpO2: 100%

## 2021-08-18 NOTE — ANESTHESIA PREPROCEDURE EVALUATION
Procedure:  EXCISION WIDE LESION UPPER EXTREMITY (Right Shoulder)  BIOPSY LYMPH NODE SENTINEL, INTRAOPERATIVE   LYMPHATIC MAPPING, LYMPHOSCINTIGRAPHY (NUC MED INJECT AT 0730) (Right Axilla)  RECONSTRUCTION RIGHT SHOULDER DEFECT W/ COMPLEX CLOSURE VS SKIN GRAFT SPLIT THICKNESS (STSG) (Right Shoulder)  POSSIBLE ADJACENT TISSUE TRANSFER RIGHT SHOULDER (Right Shoulder)    Relevant Problems   ANESTHESIA   (-) History of anesthesia complications   (-) Malignant hyperthermia   (-) PONV (postoperative nausea and vomiting)      CARDIO   (-) CAD (coronary artery disease)   (-) Chest pain   (-) ZAVALETA (dyspnea on exertion)   (-) HTN (hypertension)   (-) Murmur      ENDO   (-) Diabetes mellitus type 1 (HCC)   (-) Diabetes mellitus, type 2 (HCC)   (-) Hyperthyroidism      GYN  currently breast feeding      PULMONARY   (-) Asthma   (-) Oxygen dependent   (-) Shortness of breath   (-) Smoking   (-) URI (upper respiratory infection)        Physical Exam    Airway    Mallampati score: I  TM Distance: >3 FB  Neck ROM: full     Dental   No notable dental hx     Cardiovascular  Cardiovascular exam normal    Pulmonary  Breath sounds clear to auscultation,     Other Findings        Anesthesia Plan  ASA Score- 1     Anesthesia Type- general with ASA Monitors  Additional Monitors:   Airway Plan: ETT and LMA  Plan Factors-Exercise tolerance (METS): >4 METS  Chart reviewed  Existing labs reviewed  Patient summary reviewed  Patient is not a current smoker  Patient did not smoke on day of surgery  Induction- intravenous  Postoperative Plan- Plan for postoperative opioid use  Planned trial extubation    Informed Consent- Anesthetic plan and risks discussed with patient and mother  I personally reviewed this patient with the CRNA  Discussed and agreed on the Anesthesia Plan with the CRNA           NPO appropriate, answered questions about breast feeding and anesthesia   Informed that she should pump breast milk and karlos the first round  Notes her infant Is formula and breast fed so it should be okay  History of wisdom teeth removal with no issues with anesthesia then  Mother denies any family history of anesthetic issues  Standard monitors   General

## 2021-08-18 NOTE — DISCHARGE INSTRUCTIONS
[facility]  Dr Ze Rush [address], [address]  Phone: [phone]     Postoperative Instructions     These instructions are being provided by your doctor to give you basic guidelines during your post-op recovery  Please let our office know if your contact information has changed  Please call the office today for an appointment in a week  Dressings: Keep dressings on over back closure     Activity Restrictions: No heavy lifting     Bathing:  May shower in 48 hours     Medications:    Resume pre-op medications  You may take aleve or ibuprofen for pain control              Take pain medications as needed (as per prescription instructions)    Please call our office to schedule a follow-up appointment in two weeks with Dr Montero Rater  We look forward to seeing you then!

## 2021-08-18 NOTE — OP NOTE
OPERATIVE REPORT  PATIENT NAME: Kane Rivera    :  1990  MRN: [id number]  Pt Location: BE OR ROOM 15    SURGERY DATE: 2021    Surgeon(s) and Role:  Panel 1:     * Andreea Booker MD - Primary     * Rudy Hoskins MD - Assisting  Panel 2:     * Azar Pratt MD - Primary     * Mc Daly PA-C    Preop Diagnosis:  Malignant melanoma of shoulder, right ([facility] [address] ) [C43 61]    Post-Op Diagnosis Codes:     * Malignant melanoma of shoulder, right ([facility] [address] ) [C43 61]    Procedure(s) (LRB):  EXCISION WIDE LESION UPPER EXTREMITY (Right)  BIOPSY LYMPH NODE SENTINEL, INTRAOPERATIVE   LYMPHATIC MAPPING, LYMPHOSCINTIGRAPHY (NUC MED INJECT AT 0730) (Right)  RECONSTRUCTION RIGHT SHOULDER DEFECT W/ COMPLEX CLOSURE VS SKIN GRAFT SPLIT THICKNESS (STSG) (Right)  POSSIBLE ADJACENT TISSUE TRANSFER RIGHT SHOULDER (Right)    Specimen(s):  ID Type Source Tests Collected by Time Destination   1 : [place] Node #1 Tissue Lymph Node, [place] TISSUE EXAM Andreea Booker MD 2021 0373    2 : [place] Node #2 Tissue Lymph Node, [place] TISSUE EXAM Andreea Booker MD 2021 0919    3 : sentinel lymph node #3 Tissue Lymph Node TISSUE EXAM Andreea Booker MD 2021 4731    4 : right shoulder melanoma, long lateral/short superior Tissue Lesion TISSUE EXAM Andreea Booker MD 2021 1009        Estimated Blood Loss:   Minimal    Drains:  * No LDAs found *    Anesthesia Type:   General    Operative Indications:  Malignant melanoma of shoulder, right ([facility] [address] ) [C43 61]      Operative Findings:  3 avid and blue sentinel cervical nodes; successful resection of primary melanoma    Complications:   None    Procedure and Technique:  The patient was met in the scintigraphy  The site of mapping was marked at the right cervical solitario basin, likely level IV  The patient was then transported to preop and finally to the operating room  The patient was identified in and general endotracheal intubation was achieved    The patient's right shoulder melanoma was injected with 1 mL methylene blue  The patient's right neck and shoulder were prepped and draped in the usual sterile fashion after removal of the hair in this region  A 2 5 cm incision was made at a fold of the neck just over the marked site of activity  The deeper tissues and platysma were divided with electrocautery, and limited SCM fibers were also divided to allow visualization of the nodes deep to the muscle  One lymph node was found which was both hot and blue  This lymph node was circumferentially dissected with electrocautery and major lymph branches were tied off with 3-0 vicryl  It was then passed off field as sentinel lymph node 1  The rest of the region was carefully checked for any signs of abnormality, blue dye, or elevated Anza counts  An additional lymph node was found which was both hot and blue  This lymph node was circumferentially dissected with electrocautery and major lymph branches were tied off with 3-0 vicryl  It was then passed off field as sentinel lymph node 2  The rest of the region was carefully checked for any signs of abnormality, blue dye, or elevated Anza counts  One more lymph node was found which was both hot and blue  This lymph node was circumferentially dissected with electrocautery and major lymph branches were tied off with 3-0 vicryl  It was then passed off field as sentinel lymph node 3  The rest of the region was carefully checked for any signs of abnormality, blue dye, or elevated Anza counts  No other nodes were detected  The background signal was less than 10% of the hottest node  The neck was then copiously irrigated and one bleeding venous branch was encountered, which was suture ligated  Again we copiously irrigated and the wound bed was found to be hemostatic  Valsalva confirmed no bleeding  Surgicel was placed at the base of the wound   The SCM, platysma as well as the deep dermal tissue were reapproximated with 3-0 Vicryl  The skin was run with 4-0 Monocryl followed by skin glue  Steri-Strips were placed over the incision after the glue was dry  We then turned our attention to the primary melanoma of the right shoulder  The margins of the melanoma were circumferentially marked at 1 1 x 0 9 cm  A circumferential margin of 2 cm was marked  After local anesthetic was infused, The skin was incised sharply to the muscular fascia  The melanoma was then dissected off of the fascia with electrocautery  The specimen was marked with a long stitch at the lateral margin and a short stitch at the superior margin  The final dimensions were 4 9 cm x 6 2 cm  The defect was copiously irrigated and found to be hemostatic  Dr Lexie Alves then assumed care of the patient for closure        I was present for the entire procedure    Patient Disposition:  Dr Lexie Alves' portion of the case    SIGNATURE: Ronie Dancer, MD  DATE: August 18, 2021  TIME: 10:18 AM

## 2021-08-18 NOTE — H&P
H&P - Plastic Surgery   Rupinder Parents 31 y o  female MRN: [id number]  Unit/Bed#:  Encounter: [id number]           Assessment:  Malignant melanoma of shoulder, right   Plan:  EXCISION WIDE LESION UPPER EXTREMITY (Right Shoulder)       BIOPSY LYMPH NODE SENTINEL, INTRAOPERATIVE   LYMPHATIC MAPPING, LYMPHOSCINTIGRAPHY (NUC MED INJECT AT 0730) (Right Axilla)       RECONSTRUCTION RIGHT SHOULDER DEFECT W/ COMPLEX CLOSURE VS SKIN GRAFT SPLIT THICKNESS (STSG) (Right Shoulder)       POSSIBLE ADJACENT TISSUE TRANSFER RIGHT SHOULDER (Right Shoulder)        HPI:   Mrs Ivis Tucker is a 31years old female who presents for evaluation of a skin lesion  She was seen by Dr Alvin Whitney at the end of July      Location: Right shoulder   Duration: 1 year   Symptoms: lesion progressively enlarged and change in color, prompting medical attention  Biopsy performed by dermatology showed Melanoma at least 2 5 mm in depth with  Ulcerative characteristics  She has been evaluated by surgical oncologist and sentinel lymph node with wide local excision was recommended  Dr Alvin Whitney was asked to assist with anticipated secondary defect       She denies prior history of cancer  REVIEW OF SYSTEMS    GENERAL/CONSTITUTIONAL: The patient denies fever, fatigue, weakness, weight gain or weight loss  HEAD, EYES, EARS, NOSE AND THROAT: Eyes - The patient denies pain, redness, loss of vision, double or blurred vision and denies wearing glasses  The patient denies ringing in the ears, nosebleeds sinusitis, post nasal drip  Also denies frequent sore throats, hoarseness, painful swallowing  CARDIOVASCULAR: The patient denies chest pain, irregular heartbeats, palpitations, shortness of breath, heart murmurs, high blood pressure, cramps in his legs with walking, pain in his feet or toes at night or varicose veins  RESPIRATORY: The patient denies chronic cough, wheezing or night sweats    GASTROINTESTINAL: The patient denies decreased appetite, nausea, vomiting, diarrhea, constipation, blood in the stools  GENITOURINARY: The patient denies difficult urination, pain or burning with urination, blood in the urine  MUSCULOSKELETAL: The patient denies arm, thigh or calf cramps  No joint or muscle pain  No muscle weakness or tenderness  No joint swelling, neck pain, back pain or major orthopedic injuries  SKIN AND BREASTS: see hpi  The patient denies easy bruising, skin redness, skin rash, hives  NEUROLOGIC: The patient denies headache, dizziness, fainting, memory loss  PSYCHIATRIC: The patient denies depression anxiety  ENDOCRINE: The patient denies intolerance to hot or cold temperature, flushing, fingernail changes, increased thirst, increased salt intake or decreased sexual desire  HEMATOLOGIC/LYMPHATIC: The patient denies anemia, bleeding tendency or clotting tendency  ALLERGIC/IMMUNOLOGIC: The patient denies rhinitis, asthma, skin sensitivity, latex allergies or sensitivity  Historical Information   History reviewed  No pertinent past medical history  Past Surgical History:   Procedure Laterality Date    WISDOM TOOTH EXTRACTION       Social History   Social History     Substance and Sexual Activity   Alcohol Use Yes    Comment: rare     Social History     Substance and Sexual Activity   Drug Use No     Social History     Tobacco Use   Smoking Status Former Smoker    Packs/day: 0 50    Years: 1 00    Pack years: 0 50    Types: Cigarettes    Quit date: 2018    Years since quitting: 3 6   Smokeless Tobacco Never Used   Tobacco Comment    about 2-3 years ago      Family History:   Family History   Problem Relation Age of Onset    No Known Problems Mother        Meds/Allergies   No current facility-administered medications for this encounter      Current Outpatient Medications:     norethindrone (MICRONOR) 0 35 MG tablet, Take 0 35 mg by mouth daily, Disp: , Rfl:     Prenatal Vit-Fe Fumarate-FA ([facility] Prenatal Vitamins) 28-0 8 MG TABS, Take 1 tablet by mouth daily, Disp: , Rfl:     TRI-LEGEST FE 1-20/1-30/1-35 MG-MCG TABS, TAKE 1 TABLET DAILY AS DIRECTED , Disp: 140 tablet, Rfl: 2      Objective     Physical Exam  Constitutional:       Appearance: Normal appearance  HENT:      Head: Normocephalic  Nose: Nose normal    Eyes:      Extraocular Movements: Extraocular movements intact  Pupils: Pupils are equal, round, and reactive to light  Cardiovascular:      Rate and Rhythm: Normal rate  Pulmonary:      Effort: Pulmonary effort is normal    Abdominal:      Palpations: Abdomen is soft  Musculoskeletal:         General: Normal range of motion  Cervical back: Normal range of motion  Skin:         Neurological:      General: No focal deficit present  Mental Status: She is alert and oriented to person, place, and time  Psychiatric:         Mood and Affect: Mood normal      Lab Results:   No results found for: WBC, HGB, HCT, MCV, PLT       No results found for: TISSUECULT, WOUNDCULT      Imaging Studies:   No results found  EKG, Pathology, and Other Studies:   Lab Results   Component Value Date/Time    [facility]  07/28/2021 08:23 AM     A  Skin, right superior medial anterior shoulder, shave biopsy (1 slide [id number] [facility], collected 7/8/2021):    MELANOMA (thickness: at least 2 5 mm); extending to the peripheral and deep specimen margins (see note)  Note: Please see synoptic report for additional details           No intake or output data in the 24 hours ending 08/17/21 2113    Invasive Devices     None                 VTE Prophylaxis: Sequential compression device Nieves Jacobs)     Code Status: No Order  Advance Directive and Living Will:      Power of :

## 2021-08-18 NOTE — OP NOTE
OPERATIVE REPORT  PATIENT NAME: Benigno Rivera    :  1990  MRN: [id number]  Pt Location: BE OR ROOM 15    SURGERY DATE: 2021    Surgeon(s) and Role:  Panel 1:     * Darya Mead MD - Primary     * Emiliano Manjarrez MD - Assisting  Panel 2:     * Aminta Light MD - Primary     * Aleksandarlorraine Beach PA-C    Preop Diagnosis:  Malignant melanoma of shoulder, right ([address] ) [C43 61]    Post-Op Diagnosis Codes:     * Malignant melanoma of shoulder, right ([address] ) [C43 61]    Procedure(s) (LRB):  EXCISION WIDE LESION UPPER EXTREMITY (Right)  BIOPSY LYMPH NODE SENTINEL, INTRAOPERATIVE   LYMPHATIC MAPPING, LYMPHOSCINTIGRAPHY (NUC MED INJECT AT 0730) (Right)  RIGHT SHOULDER  COMPLEX CLOSURE (Right)    Specimen(s):  ID Type Source Tests Collected by Time Destination   1 : [place] Node #1 Tissue Lymph Node, [place] TISSUE EXAM Darya Mead MD 2021 2748    2 : [place] Node #2 Tissue Lymph Node, [place] TISSUE EXAM Darya Mead MD 2021 0919    3 : sentinel lymph node #3 Tissue Lymph Node TISSUE EXAM Darya Mead MD 2021 6688    4 : right shoulder melanoma, long lateral/short superior Tissue Lesion TISSUE EXAM Darya Mead MD 2021 1009    5 : medial margin right shoilder Tissue Soft Tissue, Other TISSUE EXAM Aminta Light MD 2021 1038    6 : lateral margin right shoulder Tissue Soft Tissue, Other TISSUE EXAM Aminta Light MD 2021 1040        Estimated Blood Loss:   Minimal    Drains:  * No LDAs found *    Anesthesia Type:   General    Operative Indications:  Malignant melanoma of shoulder, right ([address] ) [C43 61]      Operative Findings:  Right shoulder secondary defect 4 9 cm x 6 2 x 1 cm  Closure 7 cm       Complications:   None    Procedure and Technique:  Patient was taken to the operative theater by the primary team   Please refer to separate op note for details of the 1st panel the operation    Once the specimen was removed I was then called into the room found the patient under general anesthesia with the right shoulder already prepped and draped in usual sterile fashion  I obtained a sign out from the primary surgeon and a 2nd time-out was then performed  Defect was encountered along the right superior shoulder and he was deemed amenable for primary closure with better cosmesis  Skin flaps were then raised anteriorly and posteriorly pre extensive in order to allow closure without undue tension  The skin was then oriented and longitudinal vertical fashion wound medial to lateral and tailor tacked with staples  Medial and lateral standing cone deformities was then removed passed of the table for permanent pathology  Layered closure was then performed with interrupted 2-0 Vicryl along the subcutaneous tissue followed by deep dermal 3-0 Monocryl and a running 3-0 strata fix subcuticular layer  Surgical glue and dry dressing was then applied conclusion of the case     I was present for all critical portions of the procedure and A physician assistant was required during the procedure for retraction tissue handling,dissection and suturing    Patient Disposition:  PACU  and hemodynamically stable    SIGNATURE: [id number] X[facility] MD  DATE: August 18, 2021  TIME: 2:18 PM

## 2021-09-07 ENCOUNTER — OFFICE VISIT (OUTPATIENT)
Dept: SURGICAL ONCOLOGY | Facility: CLINIC | Age: 31
End: 2021-09-07

## 2021-09-07 VITALS
HEIGHT: 67 IN | RESPIRATION RATE: 18 BRPM | SYSTOLIC BLOOD PRESSURE: 90 MMHG | TEMPERATURE: 98.7 F | BODY MASS INDEX: 22.76 KG/M2 | WEIGHT: 145 LBS | HEART RATE: 62 BPM | OXYGEN SATURATION: 97 % | DIASTOLIC BLOOD PRESSURE: 62 MMHG

## 2021-09-07 DIAGNOSIS — C43.61 MALIGNANT MELANOMA OF SHOULDER, RIGHT (HCC): Primary | ICD-10-CM

## 2021-09-07 PROCEDURE — 99024 POSTOP FOLLOW-UP VISIT: CPT | Performed by: STUDENT IN AN ORGANIZED HEALTH CARE EDUCATION/TRAINING PROGRAM

## 2021-09-07 NOTE — LETTER
September 9, 2021     Nicolas Bryant, [address]  [address]    Patient: Wei Smith   YOB: 1990   Date of Visit: 9/7/2021       Dear Dr Nella Santana: Thank you for referring Saniya Escamilla to me for evaluation  Below are my notes for this consultation  If you have questions, please do not hesitate to call me  I look forward to following your patient along with you  Sincerely,        Fátima Valenzuela MD        CC: No Recipients  Fátima Valenzuela MD  9/9/2021 11:51 AM  Incomplete  Surgical Oncology Consultation    [address] [facility] [place] [address] [facility]  [phone]    Patient:  Jojo Rivera  1990  [id number]    Primary Care provider:  Pa Gibson DO  [address]  [address] [place]    Referring provider:  No referring provider defined for this encounter  Diagnoses and all orders for this visit:    Malignant melanoma of shoulder, right [facility])        Chief Complaint   Patient presents with    Post-op       No follow-ups on file  Oncology History   Malignant melanoma of shoulder, right ([address] )   7/8/2021 Biopsy    Right anterior shoulder:  - Ulcerated malignant melanoma, 2 5mm    Ulceration: Yes  Mitosis: >1     7/8/2021 Genomic Testing    DecisionDx - Class 2B     8/18/2021 Surgery    Wide excision melanoma with sentinel lymph node biopsy       History of Present Illness  : This is a 29-year-old female with a new diagnosis of melanoma  She had a freckle at her right shoulder that she noticed she thinks about a year ago when she was pregnant  This began to grow and dark in color, and she sought consultation with Dermatology  They performed a shave biopsy, which noted to at least 2 5 mm in depth melanoma with ulceration with a positive deep margin  Current T stage is T3b    The patient denies any other lumps or bumps of the cervical or axillary basins on the right side  She denies any other concerning lesions  She is scheduled for full skin check with Dermatology given her new diagnosis  She does not recall any extensive sun burning, and she has dark hair and dark eyes  She currently uses sunscreen, however not every time she has sun exposure  Interval History 9/7/21  Postop from [facility] and [facility]  No residual chel  Nodes negative  Ax incision healing well  Shoulder healing well and managed by plastics  Review of Systems  Complete ROS Surg Onc:   Constitutional: The patient denies new or recent history of general fatigue, no recent weight loss, no change in appetite  Eyes: No complaints of visual problems, no scleral icterus  ENT: No complaints of ear pain, no hoarseness, no difficulty swallowing,  no tinnitus and no new masses in head, oral cavity, or neck  Cardiovascular: No complaints of chest pain, no palpitations, no ankle edema  Respiratory: No complaints of shortness of breath, no cough  Gastrointestinal: No complaints of jaundice, no bloody stools, no pale stools  Genitourinary: No complaints of dysuria, no hematuria, no nocturia, no frequent urination, no urethral discharge  Musculoskeletal: No complaints of weakness, paralysis, joint stiffness or arthralgias  Integumentary: No complaints of rash, no new lesions  Neurological: No complaints of convulsions, no seizures, no dizziness  Hematologic/Lymphatic: No complaints of easy bruising  Endocrine:  No hot or cold intolerance  No polydipsia, polyphagia, or polyuria  Allergy/immunology:  No environmental allergies  No food allergies  Not immunocompromised        Patient Active Problem List   Diagnosis    ADD (attention deficit disorder) without hyperactivity    COVID-19    Malignant melanoma of shoulder, right ([address] )     Past Medical History:   Diagnosis Date    Lymphoma [facility])      Past Surgical History:   Procedure Laterality Date    LYMPH NODE BIOPSY Right 8/18/2021 Procedure: BIOPSY LYMPH NODE SENTINEL, INTRAOPERATIVE   LYMPHATIC MAPPING, LYMPHOSCINTIGRAPHY (NUC MED INJECT AT 0730); Surgeon: Noé Anderson MD;  [facility];  Service: Surgical Oncology    SKIN LESION EXCISION Right 8/18/2021    Procedure: EXCISION WIDE LESION UPPER EXTREMITY;  Surgeon: Noé Anderson MD;  [facility];  Service: Surgical Oncology    SPLIT THICKNESS SKIN GRAFT Right 8/18/2021    Procedure: RIGHT SHOULDER  COMPLEX CLOSURE;  Surgeon: Jasmin Awad MD;  [facility];  Service: Plastics    WISDOM TOOTH EXTRACTION       Family History   Problem Relation Age of Onset    No Known Problems Mother     Breast cancer Maternal Aunt     Breast cancer Maternal Grandmother      Social History     Socioeconomic History    Marital status: Single     Spouse name: Not on file    Number of children: Not on file    Years of education: Not on file    Highest education level: Not on file   Occupational History    Not on file   Tobacco Use    Smoking status: Former Smoker     Packs/day: 0 25     Years: 1 00     Pack years: 0 25     Types: Cigarettes     Quit date: 2018     Years since quitting: 3 6    Smokeless tobacco: Never Used    Tobacco comment: about 2-3 years ago    Vaping Use    Vaping Use: Never used   Substance and Sexual Activity    Alcohol use: Yes     Alcohol/week: 1 0 standard drinks     Types: 1 Cans of beer per week     Comment: rare    Drug use: No    Sexual activity: Yes     Partners: Male   Other Topics Concern    Not on file   Social History Narrative    Denied history of daily coffee consumption      Social Determinants of Health     Financial Resource Strain:     Difficulty of Paying Living Expenses:    Food Insecurity:     Worried About Running Out of Food in the Last Year:     Ran Out of Food in the Last Year:    Transportation Needs:     Lack of Transportation (Medical):      Lack of Transportation (Non-Medical):    Physical Activity:     Days of Exercise per Week:     Minutes of Exercise per Session:    Stress:     Feeling of Stress :    Social Connections:     Frequency of Communication with Friends and Family:     Frequency of Social Gatherings with Friends and Family:     Attends Quaker Services:     Active Member of Clubs or Organizations:     Attends Club or Organization Meetings:     Marital Status:    Intimate Partner Violence:     Fear of Current or Ex-Partner:     Emotionally Abused:     Physically Abused:     Sexually Abused:        Current Outpatient Medications:     norethindrone (MICRONOR) 0 35 MG tablet, Take 0 35 mg by mouth daily, Disp: , Rfl:     Prenatal Vit-Fe Fumarate-FA (GoodSense Prenatal Vitamins) 28-0 8 MG TABS, Take 1 tablet by mouth daily, Disp: , Rfl:     ibuprofen (MOTRIN) 600 mg tablet, Take 1 tablet (600 mg total) by mouth 3 (three) times a day with meals, Disp: 30 tablet, Rfl: 0  Allergies   Allergen Reactions    Strawberry Extract - Food Allergy Swelling       Vitals:    09/07/21 1505   BP: 90/62   Pulse: 62   Resp: 18   Temp: 98 7 °F (37 1 °C)   SpO2: 97%       Physical Exam   General: Appears well, appears stated age  Skin: Warm, anicteric  HEENT: Normocephalic, atraumatic; sclera aniceteric, mucous membranes moist; cervical nodes without adenopathy  Cardiopulmonary: RRR, Easy WOB, no BLE edema  Abd: Flat and soft, nontender, no masses appreciated, no hepatosplenomegaly  MSK: Symmetric, no cyanosis, no overt weakness  RT shoulder scar healing well  Axillary incision healing well  Lymphatic: No cervical, axillary or inguinal lymphadenopathy  Neuro: Affect appropriate, no gross motor abnormalities    Pathology:  A  Imlay, Imlay Node #1, biopsy:  No tumor seen in one lymph node (0/1); see note      Note:  Immunostains for S100, HMB45 and Mart 1 do not reveal metastatic melanoma        B   [place]mlay Node #2, biopsy:  - No tumor seen in seven lymph nodes (0/7); see note      Note: Immunostains for S100, HMB45 and Mart 1 do not reveal metastatic melanoma        C  [place] lymph node #3, biopsy:  - No tumor seen in one lymph node (0/1); see note      Note:  Immunostains for S100, HMB45 and Mart 1 do not reveal metastatic melanoma        D  Skin and subcutis, right shoulder, excision:  - Prior biopsy site reaction, no residual tumor seen  - Inked margins with no tumor seen  Labs: Reviewed in EPIC    Imaging  No results found  I independently reviewed and interpreted the above laboratory and imaging data  Discussion/Summary:   Pt is s/p WLE + SLN for T3aN0 melanoma of right shoulder  SLN neg  Path reviewed  Dicussed surveillance with q3 mo visits with derm for skin checks as well as q6 mo visits with me for repeat PE  Discussed vigilance about new skin lesions in this region or new lumps/bumps in LN basins  Discussed need for continued sun protection with sunscreen, hats, minimizing sun exposure  Patient and family expressed understanding and endorsement

## 2021-09-09 NOTE — PROGRESS NOTES
Surgical Oncology Consultation    [address] [facility]   [address]  [facility] [address] [phone]    Patient:  Darren Little  1990  [id number]    Primary Care provider:  Piotr Dinero DO  [address]  [address] [place]    Referring provider:  No referring provider defined for this encounter  Diagnoses and all orders for this visit:    Malignant melanoma of shoulder, right [facility])        Chief Complaint   Patient presents with    Post-op       No follow-ups on file  Oncology History   Malignant melanoma of shoulder, right ([address] )   7/8/2021 Biopsy    Right anterior shoulder:  - Ulcerated malignant melanoma, 2 5mm    Ulceration: Yes  Mitosis: >1     7/8/2021 Genomic Testing    DecisionDx - Class 2B     8/18/2021 Surgery    Wide excision melanoma with sentinel lymph node biopsy       History of Present Illness  : This is a 80-year-old female with a new diagnosis of melanoma  She had a freckle at her right shoulder that she noticed she thinks about a year ago when she was pregnant  This began to grow and dark in color, and she sought consultation with Dermatology  They performed a shave biopsy, which noted to at least 2 5 mm in depth melanoma with ulceration with a positive deep margin  Current T stage is T3b  The patient denies any other lumps or bumps of the cervical or axillary basins on the right side  She denies any other concerning lesions  She is scheduled for full skin check with Dermatology given her new diagnosis  She does not recall any extensive sun burning, and she has dark hair and dark eyes  She currently uses sunscreen, however not every time she has sun exposure  Interval History 9/7/21  Postop from [facility] and [facility]  No residual chel  Nodes negative  Ax incision healing well  Shoulder healing well and managed by plastics         Review of Systems  Complete ROS Surg Onc:   Constitutional: The patient denies new or recent history of general fatigue, no recent weight loss, no change in appetite  Eyes: No complaints of visual problems, no scleral icterus  ENT: No complaints of ear pain, no hoarseness, no difficulty swallowing,  no tinnitus and no new masses in head, oral cavity, or neck  Cardiovascular: No complaints of chest pain, no palpitations, no ankle edema  Respiratory: No complaints of shortness of breath, no cough  Gastrointestinal: No complaints of jaundice, no bloody stools, no pale stools  Genitourinary: No complaints of dysuria, no hematuria, no nocturia, no frequent urination, no urethral discharge  Musculoskeletal: No complaints of weakness, paralysis, joint stiffness or arthralgias  Integumentary: No complaints of rash, no new lesions  Neurological: No complaints of convulsions, no seizures, no dizziness  Hematologic/Lymphatic: No complaints of easy bruising  Endocrine:  No hot or cold intolerance  No polydipsia, polyphagia, or polyuria  Allergy/immunology:  No environmental allergies  No food allergies  Not immunocompromised  Patient Active Problem List   Diagnosis    ADD (attention deficit disorder) without hyperactivity    COVID-19    Malignant melanoma of shoulder, right ([address] )     Past Medical History:   Diagnosis Date    Lymphoma [facility])      Past Surgical History:   Procedure Laterality Date    LYMPH NODE BIOPSY Right 8/18/2021    Procedure: BIOPSY LYMPH NODE SENTINEL, INTRAOPERATIVE   LYMPHATIC MAPPING, LYMPHOSCINTIGRAPHY (NUC MED INJECT AT 0730);   Surgeon: Linda Guzman MD;  [facility];  Service: Surgical Oncology    SKIN LESION EXCISION Right 8/18/2021    Procedure: EXCISION WIDE LESION UPPER EXTREMITY;  Surgeon: Linda Guzman MD;  [facility];  Service: Surgical Oncology    SPLIT THICKNESS SKIN GRAFT Right 8/18/2021    Procedure: RIGHT SHOULDER  COMPLEX CLOSURE;  Surgeon: Kay Alfaro MD;  [facility];  Service: Plastics    WISDOM TOOTH EXTRACTION       Family History   Problem Relation Age of Onset    No Known Problems Mother     Breast cancer Maternal Aunt     Breast cancer Maternal Grandmother      Social History     Socioeconomic History    Marital status: Single     Spouse name: Not on file    Number of children: Not on file    Years of education: Not on file    Highest education level: Not on file   Occupational History    Not on file   Tobacco Use    Smoking status: Former Smoker     Packs/day: 0 25     Years: 1 00     Pack years: 0 25     Types: Cigarettes     Quit date: 2018     Years since quitting: 3 6    Smokeless tobacco: Never Used    Tobacco comment: about 2-3 years ago    Vaping Use    Vaping Use: Never used   Substance and Sexual Activity    Alcohol use: Yes     Alcohol/week: 1 0 standard drinks     Types: 1 Cans of beer per week     Comment: rare    Drug use: No    Sexual activity: Yes     Partners: Male   Other Topics Concern    Not on file   Social History Narrative    Denied history of daily coffee consumption      Social Determinants of Health     Financial Resource Strain:     Difficulty of Paying Living Expenses:    Food Insecurity:     Worried About Running Out of Food in the Last Year:     Ran Out of Food in the Last Year:    Transportation Needs:     Lack of Transportation (Medical):      Lack of Transportation (Non-Medical):    Physical Activity:     Days of Exercise per Week:     Minutes of Exercise per Session:    Stress:     Feeling of Stress :    Social Connections:     Frequency of Communication with Friends and Family:     Frequency of Social Gatherings with Friends and Family:     Attends Sikhism Services:     Active Member of Clubs or Organizations:     Attends Club or Organization Meetings:     Marital Status:    Intimate Partner Violence:     Fear of Current or Ex-Partner:     Emotionally Abused:     Physically Abused:     Sexually Abused: Current Outpatient Medications:     norethindrone (MICRONOR) 0 35 MG tablet, Take 0 35 mg by mouth daily, Disp: , Rfl:     Prenatal Vit-Fe Fumarate-FA (GoodSense Prenatal Vitamins) 28-0 8 MG TABS, Take 1 tablet by mouth daily, Disp: , Rfl:     ibuprofen (MOTRIN) 600 mg tablet, Take 1 tablet (600 mg total) by mouth 3 (three) times a day with meals, Disp: 30 tablet, Rfl: 0  Allergies   Allergen Reactions    Strawberry Extract - Food Allergy Swelling       Vitals:    09/07/21 1505   BP: 90/62   Pulse: 62   Resp: 18   Temp: 98 7 °F (37 1 °C)   SpO2: 97%       Physical Exam   General: Appears well, appears stated age  Skin: Warm, anicteric  HEENT: Normocephalic, atraumatic; sclera aniceteric, mucous membranes moist; cervical nodes without adenopathy  Cardiopulmonary: RRR, Easy WOB, no BLE edema  Abd: Flat and soft, nontender, no masses appreciated, no hepatosplenomegaly  MSK: Symmetric, no cyanosis, no overt weakness  RT shoulder scar healing well  Axillary incision healing well  Lymphatic: No cervical, axillary or inguinal lymphadenopathy  Neuro: Affect appropriate, no gross motor abnormalities    Pathology:  A  Renovo, Renovo Node #1, biopsy:  No tumor seen in one lymph node (0/1); see note      Note:  Immunostains for S100, HMB45 and Mart 1 do not reveal metastatic melanoma        B  [place] Node #2, biopsy:  - No tumor seen in seven lymph nodes (0/7); see note      Note:  Immunostains for S100, HMB45 and Mart 1 do not reveal metastatic melanoma        C  [place] lymph node #3, biopsy:  - No tumor seen in one lymph node (0/1); see note      Note:  Immunostains for S100, HMB45 and Mart 1 do not reveal metastatic melanoma        D  Skin and subcutis, right shoulder, excision:  - Prior biopsy site reaction, no residual tumor seen  - Inked margins with no tumor seen  Labs: Reviewed in EPIC    Imaging  No results found      I independently reviewed and interpreted the above laboratory and imaging data     Discussion/Summary:   Pt is s/p WLE + SLN for T3aN0 melanoma of right shoulder  SLN neg  Path reviewed  Dicussed surveillance with q3 mo visits with derm for skin checks as well as q6 mo visits with me for repeat PE  Discussed vigilance about new skin lesions in this region or new lumps/bumps in LN basins  Discussed need for continued sun protection with sunscreen, hats, minimizing sun exposure  Patient and family expressed understanding and endorsement

## 2021-09-10 ENCOUNTER — DOCUMENTATION (OUTPATIENT)
Dept: HEMATOLOGY ONCOLOGY | Facility: CLINIC | Age: 31
End: 2021-09-10

## 2021-09-10 ENCOUNTER — PATIENT OUTREACH (OUTPATIENT)
Dept: HEMATOLOGY ONCOLOGY | Facility: CLINIC | Age: 31
End: 2021-09-10

## 2021-09-10 DIAGNOSIS — C43.61 MALIGNANT MELANOMA OF SHOULDER, RIGHT (HCC): Primary | ICD-10-CM

## 2021-09-10 NOTE — PROGRESS NOTES
Melanoma & Cutaneous Oncology Multidisciplinary Case Review    DATE: 9/10/21    PRESENTING DOCTOR: Dr Adolfo Caballero    DIAGNOSIS: Melanoma of shoulder, stage IIA    Noe Roberts is a 31 y o  female who was presented at the Melanoma & Cutaneous Oncology Multidisciplinary Tumor Conference today for treatment planning recommendations  PHYSICIAN RECOMMENDED PLAN:    - Surveillance every 3 months with dermatology and every 6 months with surgical oncology  - Imaging is not indicated at this time based on stage  - Referral to medical oncology, Dr Zaire Luu, for additional surveillance     Patient was called and notified of the above recommendations  Consultation with Dr Zaire Luu scheduled for 9/20/21  Patient follows with an outside dermatologist     Pathology reviewed:  yes    Radiology reveiwed: N/A    Referrals: Yes, medical oncology    Procedures:  N/A    Team agreed to plan  The final treatment plan will be left to the discretion of the patient and the treating physician  DISCLAIMERS:    TO THE TREATING PHYSICIAN:  This conference is a meeting of clinicians from various specialty areas who evaluate and discuss patients for whom a multidisciplinary treatment approach is being considered  Please note that the above opinion was a consensus of the conference attendees and is intended only to assist in quality care of the discussed patient  The responsibility for follow up on the input given during the conference, along with any final decisions regarding plan of care, is that of the patient and the patient's provider  Accordingly, appointments have only been recommended based on this information and have NOT been scheduled unless otherwise noted  TO THE PATIENT:  This summary is a brief record of major aspects of your cancer treatment  You may choose to share a copy with any of your doctors or nurses  However, this is not a detailed or comprehensive record of your care        NCCN guidelines were available for review during this discussion

## 2021-09-10 NOTE — PROGRESS NOTES
Phone outreach to patient today, introduced myself and my role  Reviewed with patient the recommendations from the Melanoma & Cutaneous Oncology Case Review meeting this morning  Discussed referral to Dr Jeane Davidson with medical oncology for consultation and ongoing surveillance of melanoma  Scheduled for 9/20/21 at 3:20 PM   Reiterated no further treatment or imaging is indicated at this time per the group recommendations  Patient verbalized understanding  Provided my contact information and encouraged her to call with questions or concerns

## 2021-09-16 ENCOUNTER — OFFICE VISIT (OUTPATIENT)
Dept: PLASTIC SURGERY | Facility: CLINIC | Age: 31
End: 2021-09-16

## 2021-09-16 DIAGNOSIS — C43.61 MALIGNANT MELANOMA OF SHOULDER, RIGHT (HCC): Primary | ICD-10-CM

## 2021-09-16 PROCEDURE — 99024 POSTOP FOLLOW-UP VISIT: CPT | Performed by: PHYSICIAN ASSISTANT

## 2021-09-16 NOTE — PROGRESS NOTES
Angel Cabral is post-op:   08/18/21 0827         Procedures:        EXCISION WIDE LESION UPPER EXTREMITY (Right Shoulder)       BIOPSY LYMPH NODE SENTINEL, INTRAOPERATIVE   LYMPHATIC MAPPING, LYMPHOSCINTIGRAPHY (NUC MED INJECT AT 0730) (Right Axilla)       RIGHT SHOULDER  COMPLEX CLOSURE (Right Shoulder)      Presenting for routine follow-up  S:  Doing well  Patient has noted no excessive redness, swelling, pain and discharge  O:  Wound healing well  Drainage as expected  A:  Satisfactory course  P: Patient to return  prn  She has followups with Dr Nimesh Escobar and [facility]  Post Op Scar Care Instructions:  -Massage silicone scar gel (ex, Biocorneum, [employer] Silicone Scar Gel, Scar Away) into incision twice daily for 3 months  Or   -Apply silicone scar patch (ex, Oleeva) to incision for up to 23 hours per day for 3 months    -Wear SPF 30+ on incision at all times with any sun exposure  Call with any questions or concerns  Patient is to return as needed for redness, swelling, discomfort, or any concern about her surgery

## 2021-09-20 ENCOUNTER — CONSULT (OUTPATIENT)
Dept: HEMATOLOGY ONCOLOGY | Facility: CLINIC | Age: 31
End: 2021-09-20
Payer: COMMERCIAL

## 2021-09-20 VITALS
SYSTOLIC BLOOD PRESSURE: 110 MMHG | HEIGHT: 67 IN | TEMPERATURE: 98.9 F | BODY MASS INDEX: 22.99 KG/M2 | WEIGHT: 146.5 LBS | HEART RATE: 66 BPM | RESPIRATION RATE: 12 BRPM | DIASTOLIC BLOOD PRESSURE: 72 MMHG

## 2021-09-20 DIAGNOSIS — C43.61 MALIGNANT MELANOMA OF SHOULDER, RIGHT (HCC): Primary | ICD-10-CM

## 2021-09-20 DIAGNOSIS — Z80.3 FAMILY HISTORY OF BREAST CANCER: ICD-10-CM

## 2021-09-20 PROCEDURE — 99205 OFFICE O/P NEW HI 60 MIN: CPT | Performed by: INTERNAL MEDICINE

## 2021-09-20 PROCEDURE — 3008F BODY MASS INDEX DOCD: CPT | Performed by: INTERNAL MEDICINE

## 2021-09-20 PROCEDURE — 1036F TOBACCO NON-USER: CPT | Performed by: INTERNAL MEDICINE

## 2021-09-20 NOTE — LETTER
September 27, 2021      [place]   [address] [address]    Patient: Juan J Chen   YOB: 1990   Date of Visit: 9/20/2021       Dear Dr Antoni Patel: Thank you for referring Brody Duran to me for evaluation  Below are my notes for this consultation  If you have questions, please do not hesitate to call me  I look forward to following your patient along with you  Sincerely,        Nancy Guillen MD        CC: MD Bhavani Staley Beauford Bender, MD  9/27/2021  6:28 PM  Sign when Signing Visit  [address] [place] [address]  [phone]     Date of Visit: 9/20/2021  Name: Juan J Chen   YOB: 1990        Subjective    VISIT DIAGNOSIS:  Diagnoses and all orders for this visit:    Malignant melanoma of shoulder, right ([facility] )  -     CBC and differential; Future  -     Comprehensive metabolic panel; Future  -     LD,Blood; Future  -     NM pet ct tumor imaging whole body; Future  -     Ambulatory referral to Hematology / Oncology  -     Ambulatory Referral to Oncology Genetics; Future    Family history of breast cancer  -     Ambulatory Referral to Oncology Genetics;  Future    Other orders  -     Cancel: MRI brain w wo contrast; Future        Oncology History   Malignant melanoma of shoulder, right ([facility] )   7/8/2021 Biopsy    Right anterior shoulder:  - Ulcerated malignant melanoma, 2 5mm    Ulceration: Yes  Mitosis: >1     7/8/2021 Genomic Testing    DecisionDx - Class 2B     7/8/2021 -  Cancer Staged    Staging form: Melanoma of the Skin, AJCC 8th Edition  - Pathologic stage from 7/8/2021: Stage IIB (pT3b, pN0, cM0) - Signed by Nancy Guillen MD on 9/20/2021  Stage prefix: Initial diagnosis       8/18/2021 Surgery    Wide excision melanoma with sentinel lymph node biopsy  No residual melanoma  LNs negative - 0/9 LNs        Cancer Staging  Malignant melanoma of shoulder, right ([facility] [address] )  Staging form: Melanoma of the Skin, AJCC 8th Edition  - Pathologic stage from 8/18/2021: Stage IIB (pT3b, pN0, cM0) - Unsigned     [No treatment plan]     HISTORY OF PRESENT ILLNESS: Fermin Uriarte is a 31 y o  female  who is seen in consultation at the request of Dr Javy Cedillo  History is obtained from the patient, review of all records, and discussion with Dr Javy Cedillo  Ms Crys Dejesus states that she noticed a black amie on her right shoulder in the summer of 2020  She describes to being a small black amie that she initially thought was a tick  She tried to get it off but it would not come off, so then she realized that it was likely a mole  It was minimally raised  She denies any bleeding, itching, or pain at the time  She then noticed that this lesion started to change while she was pregnant  She gave birth to her daughter in January 2021  Over time the mole became bigger and more raised as well as darker in color all over  She states the location was near her bra, and her bra strap would irritated and rub it  With that irritation she did notice bleeding at times  She is still nursing  She saw a dermatologist in July 2021 and biopsy on 07/08/2021 of the right superior medial anterior shoulder demonstrated an ulcerated malignant melanoma, primary cutaneous, with Breslow thickness at least 2 5 mm, + ulceration, Rafat level IV, greater than 1 mitoses per mm squared, histology superficial spreading, partial regression, no micro satellites, and unclear angiolymphatic invasion or neurotropism with positive margins  Pathology review here at [facility] confirmed pathological findings though reports states that ulceration is not identified in the samples that they reviewed  She was referred to Dr Javy Cedillo for surgery  She underwent wide local excision and sentinel lymph node biopsy on 08/18/2021    Pathology demonstrated skin of the right shoulder with prior biopsy site reaction and no residual tumor seen with negative margins  [place] lymph node biopsies do not reveal melanoma in 9 lymph nodes (0/9 a LNs)  She also had 2 additional soft tissue areas resected on her right shoulder 1 was the medial margin and 1 was the lateral margin  Pathology from the medial margin demonstrated no residual melanoma  Pathology from the lateral margin demonstrated junctional melanocytic hyperplasia with mild atypia and no residual melanoma  Melanoma History:  She describes having increased sun exposure though no real sunburns  She denies blistering sunburns  She does describe minimal tanning bed use about 10 times maximum  She is currently on progesterone only birth control which started around July 2021  However she was on estrogen containing birth control for about 10 years prior in her life  She denies a history of melanoma  She denies a family history of melanoma  She denies a family history of basal cell carcinoma or squamous cell carcinoma  She has a family history of breast cancer in her maternal aunt diagnosed in her 45s or 46s, as well as in her maternal grandmother though she does not know the age of diagnosis  She denies a family history of ovarian or pancreatic cancer  She has brown hair, brown eyes, and her ancestry is [place]  REVIEW OF SYSTEMS:  Review of Systems   Constitutional: Negative for appetite change, fatigue, fever and unexpected weight change  HENT:   Negative for lump/mass  Eyes: Negative for icterus  Respiratory: Negative for cough, shortness of breath and wheezing  Cardiovascular: Negative for leg swelling  Gastrointestinal: Negative for abdominal pain, constipation, diarrhea, nausea and vomiting  Genitourinary: Negative for difficulty urinating and hematuria  Musculoskeletal: Negative for arthralgias, gait problem and myalgias  Skin: Negative for itching and rash  No new, changing, or concerning lesions  Neurological: Negative for extremity weakness, gait problem, headaches, light-headedness and numbness  Hematological: Negative for adenopathy  MEDICATIONS:    Current Outpatient Medications:     norethindrone (MICRONOR) 0 35 MG tablet, Take 0 35 mg by mouth daily, Disp: , Rfl:     Prenatal Vit-Fe Fumarate-FA ([facility] Prenatal Vitamins) 28-0 8 MG TABS, Take 1 tablet by mouth daily, Disp: , Rfl:      ALLERGIES:  Allergies   Allergen Reactions    Strawberry Extract - Food Allergy Swelling        ACTIVE PROBLEMS:  Patient Active Problem List   Diagnosis    ADD (attention deficit disorder) without hyperactivity    COVID-19    Malignant melanoma of shoulder, right ([facility] )    Family history of breast cancer          PAST MEDICAL HISTORY:   Past Medical History:   Diagnosis Date    [facility]         PAST SURGICAL HISTORY:  Past Surgical History:   Procedure Laterality Date    LYMPH NODE BIOPSY Right 8/18/2021    Procedure: BIOPSY LYMPH NODE SENTINEL, INTRAOPERATIVE   LYMPHATIC MAPPING, LYMPHOSCINTIGRAPHY ([facility] MED INJECT AT 0730);   Surgeon: Darya Mead MD;  Location: [facility];  Service: Surgical Oncology    SKIN LESION EXCISION Right 8/18/2021    Procedure: EXCISION WIDE LESION UPPER EXTREMITY;  Surgeon: Darya Mead MD;  [facility];  Service: Surgical Oncology    SPLIT THICKNESS SKIN GRAFT Right 8/18/2021    Procedure: RIGHT SHOULDER  COMPLEX CLOSURE;  Surgeon: Aminta Light MD;  [facility];  Service: Plastics    WISDOM TOOTH EXTRACTION          SOCIAL HISTORY:  Social History     Socioeconomic History    Marital status: Single     Spouse name: None    Number of children: None    Years of education: None    Highest education level: None   Occupational History    None   Tobacco Use    Smoking status: Former Smoker     Packs/day: 0 25     Years: 1 00     Pack years: 0 25     Types: Cigarettes     Quit date: 2018     Years since quitting: 3 7    Smokeless tobacco: Never Used    Tobacco comment: about 2-3 years ago    Vaping Use    Vaping Use: Never used   Substance and Sexual Activity    Alcohol use: Yes     Alcohol/week: 1 0 standard drinks     Types: 1 Cans of beer per week     Comment: rare    Drug use: No    Sexual activity: Yes     Partners: Male   Other Topics Concern    None   Social History Narrative    Denied history of daily coffee consumption      Social Determinants of Health     Financial Resource Strain:     Difficulty of Paying Living Expenses:    Food Insecurity:     Worried About Running Out of Food in the Last Year:     Ran Out of Food in the Last Year:    Transportation Needs:     Lack of Transportation (Medical):  Lack of Transportation (Non-Medical):    Physical Activity:     Days of Exercise per Week:     Minutes of Exercise per Session:    Stress:     Feeling of Stress :    Social Connections:     Frequency of Communication with Friends and Family:     Frequency of Social Gatherings with Friends and Family:     Attends Baptism Services:     Active Member of Clubs or Organizations:     Attends Club or Organization Meetings:     Marital Status:    Intimate Partner Violence:     Fear of Current or Ex-Partner:     Emotionally Abused:     Physically Abused:     Sexually Abused:         FAMILY HISTORY:  Family History   Problem Relation Age of Onset    No Known Problems Mother     Breast cancer Maternal Grandmother     Breast cancer Maternal Aunt           Objective    PHYSICAL EXAMINATION:   Blood pressure 110/72, pulse 66, temperature 98 9 °F (37 2 °C), temperature source Tympanic, resp  rate 12, height 5' 7" (1 702 m), weight 66 5 kg (146 lb 8 oz)  ECOG Performance Status      Most Recent Value   ECOG Performance Status  0 - Fully active, able to carry on all pre-disease performance without restriction             Physical Exam  Constitutional:       General: She is not in acute distress  Appearance: Normal appearance  She is not toxic-appearing  HENT:      Mouth/Throat:      Mouth: Mucous membranes are moist       Pharynx: Oropharynx is clear  Eyes:      General: No scleral icterus  Cardiovascular:      Rate and Rhythm: Normal rate and regular rhythm  Pulses: Normal pulses  Heart sounds: No murmur heard  No friction rub  No gallop  Pulmonary:      Effort: Pulmonary effort is normal  No respiratory distress  Breath sounds: Normal breath sounds  No wheezing or rales  Abdominal:      General: There is no distension  Palpations: There is no mass  Tenderness: There is no abdominal tenderness  There is no rebound  Musculoskeletal:         General: No swelling or tenderness  Right lower leg: No edema  Left lower leg: No edema  Lymphadenopathy:      Head:      Right side of head: No submandibular, preauricular or posterior auricular adenopathy  Left side of head: No submandibular, preauricular or posterior auricular adenopathy  Cervical: No cervical adenopathy  Right cervical: No superficial or posterior cervical adenopathy  Left cervical: No superficial or posterior cervical adenopathy  Upper Body:      Right upper body: No supraclavicular or axillary adenopathy  Left upper body: No supraclavicular or axillary adenopathy  Lower Body: No right inguinal adenopathy  No left inguinal adenopathy  Skin:     Findings: No rash  Comments: Well healed surgical scar  No evidence of recurrence at primary site  Neurological:      General: No focal deficit present  Mental Status: She is alert and oriented to person, place, and time  Psychiatric:         Mood and Affect: Mood normal          Behavior: Behavior normal          Thought Content: Thought content normal          Judgment: Judgment normal       Comments: understandably upset during the discussion         I reviewed lab data in the chart    Labs are in Care Everywhere    No results found for: WBC, HGB, PLT, MCV   No results found for: NA, K, CL, CO2, BUN, CREATININE, GLUC, EGFRNAA, EGFRAA, CALCIUM, PROT, ALB, TBILI, ALKPHOS, AST, ALT   No results found for: LDH  No results found for: TSH  No results found for: M6HVEMV   No results found for: FREET4      RECENT IMAGING:  No results found  Assessment/Plan  Ms Rivera is a 31 yr female with Stage IIB melanoma here for disease management discussion  Family history of breast cancer  She has a family history of breast cancer in her family  Given this history and her melanoma, I am concerned that there could be a genetic underpinning to her  disease and recommend that she  speak with a genetic counselor regarding germline genetic testing  I have provided information for the genetic counselor  Malignant melanoma of shoulder, right ([facility] [address] )  Melanoma: I had an extensive discussion with the patient regarding her  diagnosis, prognosis, and recommendations for further management  We reviewed her melanoma history, current findings, pathology and imaging tests  We discussed that she has Stage IIB melanoma and has an increased risk of recurrence  We discussed the principle and reasoning behind adjuvant treatment  We discussed the data in Stage III melanoma and the overall reduced RFS and improved PFS in Stage III melanomas treated with PD1 inhibitors in the adjuvant setting  We dicussed that high risk Stage IIB/C patients can have an increased risk of recurrence and we are now investigating the use of PD1 inhibitors in this patient population  In fact, preliminary data from a Phase III study of pembrolizumab vs placebo demonstrated a RFS benefit  We also discussed the use of BRAF/MEK inhibitors in the adjuvant setting and the overall reduced RFS and improved PFS in Stage III melanoma treated with dabrafenib and trametinib    We discussed the need to have a BRAF V600 mutation in order to be eligible for this treatment  We will send her tumor for testing  We discussed the benefits, side effects, and risks of PD1 which include but are not limited to rash and autoimmune related adverse events including colitis; dermatitis; endocrinopathy including diabetes, hypophysitis, and thyroid function abnormalities; hepatitis; myocarditis; neuropathy; nephritis; pneumonitis; uveitis; and fatigue  I did provide her with information regarding the side effects of PD-1 inhibitors  We discussed that adjuvant treatment in Stage II B disease is not unreasonable to consider given the increased risk of recurrence  We discussed the limited data available as well as lack of overall survival data  She will think about whether not she wants to pursue treatment and we will discuss when she returns  We discussed that she will require ongoing monitoring and surveillance, both for disease recurrence as well as a new primary melanoma as well as other nonmelanoma skin cancers  This includes physical exam every 3 months for 3 and then every 6 months until year five, then annually with physical exams and labs, including a comprehensive metabolic panel, CBC with differential and LDH; periodic imaging studies with either CT chest, abdomen and pelvis or PET/CT scans every 6 months  She will need baseline imaging at this time  We ordered a full body PET-CT and provided her with prescription  We discussed the concerning role for estrogen in the development and growth of melanoma, in addition for the need to monitor for disease recurrence over the next 5 years  Therefore we did discuss and I did recommend that she refrain from getting pregnant over the next 5 years  I did offer her counseling and referral to FLO for egg freezing  We also would not recommend getting pregnant while on treatment  We also discussed that we would not recommend nursing either        We discussed the importance of regular cutaneous self examinations and reviewed the features of lesions that could be concerning for a primary melanoma  I did explain that she  is at risk for developing another primary melanoma as well  We also discussed avoidance of unnecessary sun exposure and use of sun protective clothing and sunscreen  I also recommended routine follow up and skin checks with dermatology  Family Screening: her  first-degree relatives, namely his siblings, parents, and children, have an increased risk of developing a melanoma over the general population given her  diagnosis of melanoma, and should have annual dermatologic screening  She will return for follow-up following her imaging  She has CBC with differential and comp, though she is missing LDH  We ordered LDH and provided her with a script to get her blood work done  She will return after getting full-body imaging  She knows to call with any issues or concerns prior to her next visit  Ms Concetta Mccarthy had all her questions and concerns addressed and she knows to call with any additional issues  Thank you for allowing me to participate in Ms Rivera's care        Britton Barcenas MD, PhD

## 2021-09-20 NOTE — PROGRESS NOTES
[facility] HEMATOLOGY ONCOLOGY SPECIALISTS [facility]  [address] [facility] [address]  [phone]     Date of Visit: 9/20/2021  Name: Char Rao   YOB: 1990        Subjective    VISIT DIAGNOSIS:  Diagnoses and all orders for this visit:    Malignant melanoma of shoulder, right ([facility] [address] )  -     CBC and differential; Future  -     Comprehensive metabolic panel; Future  -     LD,Blood; Future  -     NM pet ct tumor imaging whole body; Future  -     Ambulatory referral to Hematology / Oncology  -     Ambulatory Referral to Oncology Genetics; Future    Family history of breast cancer  -     Ambulatory Referral to Oncology Genetics; Future    Other orders  -     Cancel: MRI brain w wo contrast; Future        Oncology History   Malignant melanoma of shoulder, right ([facility] [address] )   7/8/2021 Biopsy    Right anterior shoulder:  - Ulcerated malignant melanoma, 2 5mm    Ulceration: Yes  Mitosis: >1     7/8/2021 Genomic Testing    DecisionDx - Class 2B     7/8/2021 -  Cancer Staged    Staging form: Melanoma of the Skin, AJCC 8th Edition  - Pathologic stage from 7/8/2021: Stage IIB (pT3b, pN0, cM0) - Signed by Mayelin Miller MD on 9/20/2021  Stage prefix: Initial diagnosis       8/18/2021 Surgery    Wide excision melanoma with sentinel lymph node biopsy  No residual melanoma  LNs negative - 0/9 LNs        Cancer Staging  Malignant melanoma of shoulder, right ([facility] )  Staging form: Melanoma of the Skin, AJCC 8th Edition  - Pathologic stage from 8/18/2021: Stage IIB (pT3b, pN0, cM0) - Unsigned     [No treatment plan]     HISTORY OF PRESENT ILLNESS: Char Rao is a 31 y o  female  who is seen in consultation at the request of Dr Junior Acosta  History is obtained from the patient, review of all records, and discussion with Dr Junior Acosta  Ms  Ethelyn Minium states that she noticed a black amie on her right shoulder in the summer of 2020  She describes to being a small black amie that she initially thought was a tick  She tried to get it off but it would not come off, so then she realized that it was likely a mole  It was minimally raised  She denies any bleeding, itching, or pain at the time  She then noticed that this lesion started to change while she was pregnant  She gave birth to her daughter in January 2021  Over time the mole became bigger and more raised as well as darker in color all over  She states the location was near her bra, and her bra strap would irritated and rub it  With that irritation she did notice bleeding at times  She is still nursing  She saw a dermatologist in July 2021 and biopsy on 07/08/2021 of the right superior medial anterior shoulder demonstrated an ulcerated malignant melanoma, primary cutaneous, with Breslow thickness at least 2 5 mm, + ulceration, Rafat level IV, greater than 1 mitoses per mm squared, histology superficial spreading, partial regression, no micro satellites, and unclear angiolymphatic invasion or neurotropism with positive margins  Pathology review here at [address] [place] [address] confirmed pathological findings though reports states that ulceration is not identified in the samples that they reviewed  She was referred to Dr Adolfo Caballero for surgery  She underwent wide local excision and sentinel lymph node biopsy on 08/18/2021  Pathology demonstrated skin of the right shoulder with prior biopsy site reaction and no residual tumor seen with negative margins  [place] lymph node biopsies do not reveal melanoma in 9 lymph nodes (0/9 a LNs)  She also had 2 additional soft tissue areas resected on her right shoulder 1 was the medial margin and 1 was the lateral margin  Pathology from the medial margin demonstrated no residual melanoma  Pathology from the lateral margin demonstrated junctional melanocytic hyperplasia with mild atypia and no residual melanoma  Melanoma History:  She describes having increased sun exposure though no real sunburns  She denies blistering sunburns  She does describe minimal tanning bed use about 10 times maximum  She is currently on progesterone only birth control which started around July 2021  However she was on estrogen containing birth control for about 10 years prior in her life  She denies a history of melanoma  She denies a family history of melanoma  She denies a family history of basal cell carcinoma or squamous cell carcinoma  She has a family history of breast cancer in her maternal aunt diagnosed in her 45s or 46s, as well as in her maternal grandmother though she does not know the age of diagnosis  She denies a family history of ovarian or pancreatic cancer  She has brown hair, brown eyes, and her ancestry is [place]  REVIEW OF SYSTEMS:  Review of Systems   Constitutional: Negative for appetite change, fatigue, fever and unexpected weight change  HENT:   Negative for lump/mass  Eyes: Negative for icterus  Respiratory: Negative for cough, shortness of breath and wheezing  Cardiovascular: Negative for leg swelling  Gastrointestinal: Negative for abdominal pain, constipation, diarrhea, nausea and vomiting  Genitourinary: Negative for difficulty urinating and hematuria  Musculoskeletal: Negative for arthralgias, gait problem and myalgias  Skin: Negative for itching and rash  No new, changing, or concerning lesions  Neurological: Negative for extremity weakness, gait problem, headaches, light-headedness and numbness  Hematological: Negative for adenopathy          MEDICATIONS:    Current Outpatient Medications:     norethindrone (MICRONOR) 0 35 MG tablet, Take 0 35 mg by mouth daily, Disp: , Rfl:     Prenatal Vit-Fe Fumarate-FA (GoodSense Prenatal Vitamins) 28-0 8 MG TABS, Take 1 tablet by mouth daily, Disp: , Rfl:      ALLERGIES:  Allergies   Allergen Reactions    Strawberry Extract - Food Allergy Swelling        ACTIVE PROBLEMS:  Patient Active Problem List   Diagnosis    ADD (attention deficit disorder) without hyperactivity    COVID-19    Malignant melanoma of shoulder, right ([facility] [address] )    Family history of breast cancer          PAST MEDICAL HISTORY:   Past Medical History:   Diagnosis Date    Lymphoma ([facility] [address] )         PAST SURGICAL HISTORY:  Past Surgical History:   Procedure Laterality Date    LYMPH NODE BIOPSY Right 8/18/2021    Procedure: BIOPSY LYMPH NODE SENTINEL, INTRAOPERATIVE   LYMPHATIC MAPPING, LYMPHOSCINTIGRAPHY (NUC MED INJECT AT 0730); Surgeon: Reji Shahid MD;  [facility]: [facility];  Service: Surgical Oncology    SKIN LESION EXCISION Right 8/18/2021    Procedure: EXCISION WIDE LESION UPPER EXTREMITY;  Surgeon: Reji Shahid MD;  [facility];  Service: Surgical Oncology    SPLIT THICKNESS SKIN GRAFT Right 8/18/2021    Procedure: RIGHT SHOULDER  COMPLEX CLOSURE;  Surgeon: Noemí Dukes MD;  [facility];  Service: Plastics    WISDOM TOOTH EXTRACTION          SOCIAL HISTORY:  Social History     Socioeconomic History    Marital status: Single     Spouse name: None    Number of children: None    Years of education: None    Highest education level: None   Occupational History    None   Tobacco Use    Smoking status: Former Smoker     Packs/day: 0 25     Years: 1 00     Pack years: 0 25     Types: Cigarettes     Quit date: 2018     Years since quitting: 3 7    Smokeless tobacco: Never Used    Tobacco comment: about 2-3 years ago    Vaping Use    Vaping Use: Never used   Substance and Sexual Activity    Alcohol use:  Yes     Alcohol/week: 1 0 standard drinks     Types: 1 Cans of beer per week     Comment: rare    Drug use: No    Sexual activity: Yes     Partners: Male   Other Topics Concern    None   Social History Narrative    Denied history of daily coffee consumption      Social Determinants of Health     Financial Resource Strain:     Difficulty of Paying Living Expenses:    Food Insecurity:     Worried About Running Out of Food in the Last Year:    [address] in the Last Year:    Transportation Needs:     Lack of Transportation (Medical):  Lack of Transportation (Non-Medical):    Physical Activity:     Days of Exercise per Week:     Minutes of Exercise per Session:    Stress:     Feeling of Stress :    Social Connections:     Frequency of Communication with Friends and Family:     Frequency of Social Gatherings with Friends and Family:     Attends [employer] Services:     Active Member of Clubs or Organizations:     Attends Club or Organization Meetings:     Marital Status:    Intimate Partner Violence:     Fear of Current or Ex-Partner:     Emotionally Abused:     Physically Abused:     Sexually Abused:         FAMILY HISTORY:  Family History   Problem Relation Age of Onset    No Known Problems Mother     Breast cancer Maternal Grandmother     Breast cancer Maternal Aunt           Objective    PHYSICAL EXAMINATION:   Blood pressure 110/72, pulse 66, temperature 98 9 °F (37 2 °C), temperature source Tympanic, resp  rate 12, height 5' 7" (1 702 m), weight 66 5 kg (146 lb 8 oz)  ECOG Performance Status      Most Recent Value   ECOG Performance Status  0 - Fully active, able to carry on all pre-disease performance without restriction             Physical Exam  Constitutional:       General: She is not in acute distress  Appearance: Normal appearance  She is not toxic-appearing  HENT:      Mouth/Throat:      Mouth: Mucous membranes are moist       Pharynx: Oropharynx is clear  Eyes:      General: No scleral icterus  Cardiovascular:      Rate and Rhythm: Normal rate and regular rhythm  Pulses: Normal pulses  Heart sounds: No murmur heard  No friction rub  No gallop  Pulmonary:      Effort: Pulmonary effort is normal  No respiratory distress  Breath sounds: Normal breath sounds  No wheezing or rales  Abdominal:      General: There is no distension  Palpations: There is no mass  Tenderness: There is no abdominal tenderness  There is no rebound  Musculoskeletal:         General: No swelling or tenderness  Right lower leg: No edema  Left lower leg: No edema  Lymphadenopathy:      Head:      Right side of head: No submandibular, preauricular or posterior auricular adenopathy  Left side of head: No submandibular, preauricular or posterior auricular adenopathy  Cervical: No cervical adenopathy  Right cervical: No superficial or posterior cervical adenopathy  Left cervical: No superficial or posterior cervical adenopathy  Upper Body:      Right upper body: No supraclavicular or axillary adenopathy  Left upper body: No supraclavicular or axillary adenopathy  Lower Body: No right inguinal adenopathy  No left inguinal adenopathy  Skin:     Findings: No rash  Comments: Well healed surgical scar  No evidence of recurrence at primary site  Neurological:      General: No focal deficit present  Mental Status: She is alert and oriented to person, place, and time  Psychiatric:         Mood and Affect: Mood normal          Behavior: Behavior normal          Thought Content: Thought content normal          Judgment: Judgment normal       Comments: understandably upset during the discussion         I reviewed lab data in the chart  Labs are in Care Everywhere    No results found for: WBC, HGB, PLT, MCV   No results found for: NA, K, CL, CO2, BUN, CREATININE, GLUC, EGFRNAA, EGFRAA, CALCIUM, PROT, ALB, TBILI, ALKPHOS, AST, ALT   No results found for: LDH  No results found for: TSH  No results found for: B7BHAFZ   No results found for: FREET4      RECENT IMAGING:  No results found  Assessment/Plan  Ms Rivera is a 31 yr female with Stage IIB melanoma here for disease management discussion  Family history of breast cancer  She has a family history of breast cancer in her family    Given this history and her melanoma, I am concerned that there could be a genetic underpinning to her  disease and recommend that she  speak with a genetic counselor regarding germline genetic testing  I have provided information for the genetic counselor  Malignant melanoma of shoulder, right ([address] )  Melanoma: I had an extensive discussion with the patient regarding her  diagnosis, prognosis, and recommendations for further management  We reviewed her melanoma history, current findings, pathology and imaging tests  We discussed that she has Stage IIB melanoma and has an increased risk of recurrence  We discussed the principle and reasoning behind adjuvant treatment  We discussed the data in Stage III melanoma and the overall reduced RFS and improved PFS in Stage III melanomas treated with PD1 inhibitors in the adjuvant setting  We dicussed that high risk Stage IIB/C patients can have an increased risk of recurrence and we are now investigating the use of PD1 inhibitors in this patient population  In fact, preliminary data from a Phase III study of pembrolizumab vs placebo demonstrated a RFS benefit  We also discussed the use of BRAF/MEK inhibitors in the adjuvant setting and the overall reduced RFS and improved PFS in Stage III melanoma treated with dabrafenib and trametinib  We discussed the need to have a BRAF V600 mutation in order to be eligible for this treatment  We will send her tumor for testing  We discussed the benefits, side effects, and risks of PD1 which include but are not limited to rash and autoimmune related adverse events including colitis; dermatitis; endocrinopathy including diabetes, hypophysitis, and thyroid function abnormalities; hepatitis; myocarditis; neuropathy; nephritis; pneumonitis; uveitis; and fatigue  I did provide her with information regarding the side effects of PD-1 inhibitors        We discussed that adjuvant treatment in Stage II B disease is not unreasonable to consider given the increased risk of recurrence  We discussed the limited data available as well as lack of overall survival data  She will think about whether not she wants to pursue treatment and we will discuss when she returns  We discussed that she will require ongoing monitoring and surveillance, both for disease recurrence as well as a new primary melanoma as well as other nonmelanoma skin cancers  This includes physical exam every 3 months for 3 and then every 6 months until year five, then annually with physical exams and labs, including a comprehensive metabolic panel, CBC with differential and LDH; periodic imaging studies with either CT chest, abdomen and pelvis or PET/CT scans every 6 months  She will need baseline imaging at this time  We ordered a full body PET-CT and provided her with prescription  We discussed the concerning role for estrogen in the development and growth of melanoma, in addition for the need to monitor for disease recurrence over the next 5 years  Therefore we did discuss and I did recommend that she refrain from getting pregnant over the next 5 years  I did offer her counseling and referral to FLO for egg freezing  We also would not recommend getting pregnant while on treatment  We also discussed that we would not recommend nursing either  We discussed the importance of regular cutaneous self examinations and reviewed the features of lesions that could be concerning for a primary melanoma  I did explain that she  is at risk for developing another primary melanoma as well  We also discussed avoidance of unnecessary sun exposure and use of sun protective clothing and sunscreen  I also recommended routine follow up and skin checks with dermatology      Family Screening: her  first-degree relatives, namely his siblings, parents, and children, have an increased risk of developing a melanoma over the general population given her  diagnosis of melanoma, and should have annual dermatologic screening  She will return for follow-up following her imaging  She has CBC with differential and comp, though she is missing LDH  We ordered LDH and provided her with a script to get her blood work done  She will return after getting full-body imaging  She knows to call with any issues or concerns prior to her next visit  Ms Victoria Nunez had all her questions and concerns addressed and she knows to call with any additional issues  Thank you for allowing me to participate in Ms Rivera's care        Jens Sanchez MD, PhD

## 2021-09-24 ENCOUNTER — DOCUMENTATION (OUTPATIENT)
Dept: HEMATOLOGY ONCOLOGY | Facility: CLINIC | Age: 31
End: 2021-09-24

## 2021-09-24 NOTE — PROGRESS NOTES
Cutaneous Oncology Working Group Case Review    DATE: 9/24/21    PRESENTING DOCTOR: Dr Shahida Herring    DIAGNOSIS: Melanoma, stage IIB (pT3b,pN0,cM0)    Izzy Crawford is a 31 y o  female who was presented at the Cutaneous Oncology Working Group today for case review and treatment planning  PHYSICIAN RECOMMENDED PLAN:    - PET/CT, scheduled 10/6/21  - Patient was offered adjuvant immunotherapy, will follow up with Dr Shahida Herring on 10/11/21 to review PET and make a treatment decision  Pathology reviewed: Discussed, slides not reviewed    Referrals:  N/A    Procedures:  N/A    Team agreed to plan  The final treatment plan will be left to the discretion of the patient and the treating physician  DISCLAIMERS:    TO THE TREATING PHYSICIAN:  This conference is a meeting of clinicians from various specialty areas who evaluate and discuss patients for whom a multidisciplinary treatment approach is being considered  Please note that the above opinion was a consensus of the conference attendees and is intended only to assist in quality care of the discussed patient  The responsibility for follow up on the input given during the conference, along with any final decisions regarding plan of care, is that of the patient and the patient's provider  Accordingly, appointments have only been recommended based on this information and have NOT been scheduled unless otherwise noted  TO THE PATIENT:  This summary is a brief record of major aspects of your cancer treatment  You may choose to share a copy with any of your doctors or nurses  However, this is not a detailed or comprehensive record of your care  NCCN guidelines were available for review during this discussion

## 2021-09-27 PROBLEM — Z80.3 FAMILY HISTORY OF BREAST CANCER: Status: ACTIVE | Noted: 2021-09-27

## 2021-09-27 NOTE — ASSESSMENT & PLAN NOTE
She has a family history of breast cancer in her family  Given this history and her melanoma, I am concerned that there could be a genetic underpinning to her  disease and recommend that she  speak with a genetic counselor regarding germline genetic testing  I have provided information for the genetic counselor

## 2021-09-27 NOTE — ASSESSMENT & PLAN NOTE
Melanoma: I had an extensive discussion with the patient regarding her  diagnosis, prognosis, and recommendations for further management  We reviewed her melanoma history, current findings, pathology and imaging tests  We discussed that she has Stage IIB melanoma and has an increased risk of recurrence  We discussed the principle and reasoning behind adjuvant treatment  We discussed the data in Stage III melanoma and the overall reduced RFS and improved PFS in Stage III melanomas treated with PD1 inhibitors in the adjuvant setting  We dicussed that high risk Stage IIB/C patients can have an increased risk of recurrence and we are now investigating the use of PD1 inhibitors in this patient population  In fact, preliminary data from a Phase III study of pembrolizumab vs placebo demonstrated a RFS benefit  We also discussed the use of BRAF/MEK inhibitors in the adjuvant setting and the overall reduced RFS and improved PFS in Stage III melanoma treated with dabrafenib and trametinib  We discussed the need to have a BRAF V600 mutation in order to be eligible for this treatment  We will send her tumor for testing  We discussed the benefits, side effects, and risks of PD1 which include but are not limited to rash and autoimmune related adverse events including colitis; dermatitis; endocrinopathy including diabetes, hypophysitis, and thyroid function abnormalities; hepatitis; myocarditis; neuropathy; nephritis; pneumonitis; uveitis; and fatigue  I did provide her with information regarding the side effects of PD-1 inhibitors  We discussed that adjuvant treatment in Stage II B disease is not unreasonable to consider given the increased risk of recurrence  We discussed the limited data available as well as lack of overall survival data  She will think about whether not she wants to pursue treatment and we will discuss when she returns      We discussed that she will require ongoing monitoring and surveillance, both for disease recurrence as well as a new primary melanoma as well as other nonmelanoma skin cancers  This includes physical exam every 3 months for 3 and then every 6 months until year five, then annually with physical exams and labs, including a comprehensive metabolic panel, CBC with differential and LDH; periodic imaging studies with either CT chest, abdomen and pelvis or PET/CT scans every 6 months  She will need baseline imaging at this time  We ordered a full body PET-CT and provided her with prescription  We discussed the concerning role for estrogen in the development and growth of melanoma, in addition for the need to monitor for disease recurrence over the next 5 years  Therefore we did discuss and I did recommend that she refrain from getting pregnant over the next 5 years  I did offer her counseling and referral to FLO for egg freezing  We also would not recommend getting pregnant while on treatment  We also discussed that we would not recommend nursing either  We discussed the importance of regular cutaneous self examinations and reviewed the features of lesions that could be concerning for a primary melanoma  I did explain that she  is at risk for developing another primary melanoma as well  We also discussed avoidance of unnecessary sun exposure and use of sun protective clothing and sunscreen  I also recommended routine follow up and skin checks with dermatology  Family Screening: her  first-degree relatives, namely his siblings, parents, and children, have an increased risk of developing a melanoma over the general population given her  diagnosis of melanoma, and should have annual dermatologic screening  She will return for follow-up following her imaging  She has CBC with differential and comp, though she is missing LDH  We ordered LDH and provided her with a script to get her blood work done

## 2021-10-04 ENCOUNTER — TELEPHONE (OUTPATIENT)
Dept: GENETICS | Facility: CLINIC | Age: 31
End: 2021-10-04

## 2021-10-05 ENCOUNTER — TELEPHONE (OUTPATIENT)
Dept: HEMATOLOGY ONCOLOGY | Facility: CLINIC | Age: 31
End: 2021-10-05

## 2021-10-13 ENCOUNTER — TELEPHONE (OUTPATIENT)
Dept: HEMATOLOGY ONCOLOGY | Facility: CLINIC | Age: 31
End: 2021-10-13

## 2021-10-13 DIAGNOSIS — Z79.899 HIGH RISK MEDICATIONS (NOT ANTICOAGULANTS) LONG-TERM USE: ICD-10-CM

## 2021-10-13 DIAGNOSIS — Z79.899 HIGH RISK MEDICATION USE: ICD-10-CM

## 2021-10-13 DIAGNOSIS — C43.61 MALIGNANT MELANOMA OF SHOULDER, RIGHT (HCC): Primary | ICD-10-CM

## 2021-10-14 DIAGNOSIS — C43.61 MALIGNANT MELANOMA OF SHOULDER, RIGHT (HCC): Primary | ICD-10-CM

## 2021-10-15 ENCOUNTER — DOCUMENTATION (OUTPATIENT)
Dept: HEMATOLOGY ONCOLOGY | Facility: CLINIC | Age: 31
End: 2021-10-15

## 2021-10-20 ENCOUNTER — DOCUMENTATION (OUTPATIENT)
Dept: HEMATOLOGY ONCOLOGY | Facility: CLINIC | Age: 31
End: 2021-10-20

## 2021-10-21 ENCOUNTER — HOSPITAL ENCOUNTER (OUTPATIENT)
Dept: CT IMAGING | Facility: HOSPITAL | Age: 31
Discharge: HOME | End: 2021-10-21
Payer: COMMERCIAL

## 2021-10-21 DIAGNOSIS — C43.61 MALIGNANT MELANOMA OF SHOULDER, RIGHT (HCC): ICD-10-CM

## 2021-10-21 PROCEDURE — 71260 CT THORAX DX C+: CPT

## 2021-10-21 PROCEDURE — 70491 CT SOFT TISSUE NECK W/DYE: CPT

## 2021-10-21 PROCEDURE — G1004 CDSM NDSC: HCPCS

## 2021-10-21 PROCEDURE — 74177 CT ABD & PELVIS W/CONTRAST: CPT

## 2021-10-21 RX ADMIN — IOHEXOL 75 ML: 350 INJECTION, SOLUTION INTRAVENOUS at 20:04

## 2021-10-21 RX ADMIN — IOHEXOL 25 ML: 350 INJECTION, SOLUTION INTRAVENOUS at 19:29

## 2021-10-28 ENCOUNTER — DOCUMENTATION (OUTPATIENT)
Dept: HEMATOLOGY ONCOLOGY | Facility: CLINIC | Age: 31
End: 2021-10-28

## 2021-10-29 ENCOUNTER — DOCUMENTATION (OUTPATIENT)
Dept: HEMATOLOGY ONCOLOGY | Facility: CLINIC | Age: 31
End: 2021-10-29

## 2021-11-03 ENCOUNTER — DOCUMENTATION (OUTPATIENT)
Dept: HEMATOLOGY ONCOLOGY | Facility: CLINIC | Age: 31
End: 2021-11-03

## 2021-11-03 DIAGNOSIS — C43.61 MALIGNANT MELANOMA OF SHOULDER, RIGHT (HCC): Primary | ICD-10-CM

## 2021-11-03 NOTE — PROGRESS NOTES
Called  [employer] & I was told that the pt has been approved for free drug but I need to talk to [facility] to set up the shipment  transfrd there & esterke to bakari she sd that they only need the rx to set up the shipment  That is the only thing that is missing  Once they get that rx they will send a confirmation fax & then will be sharmaine to set up the shipment   fax# [phone]  Emailed arlin to send  over the script

## 2021-11-11 ENCOUNTER — TELEPHONE (OUTPATIENT)
Dept: HEMATOLOGY ONCOLOGY | Facility: CLINIC | Age: 31
End: 2021-11-11

## 2021-11-11 DIAGNOSIS — C43.61 MALIGNANT MELANOMA OF SHOULDER, RIGHT (HCC): Primary | ICD-10-CM

## 2021-11-15 ENCOUNTER — APPOINTMENT (OUTPATIENT)
Dept: LAB | Facility: MEDICAL CENTER | Age: 31
End: 2021-11-15
Payer: COMMERCIAL

## 2021-11-15 DIAGNOSIS — Z79.899 HIGH RISK MEDICATION USE: ICD-10-CM

## 2021-11-15 DIAGNOSIS — C43.61 MALIGNANT MELANOMA OF SHOULDER, RIGHT (HCC): ICD-10-CM

## 2021-11-15 LAB
ALBUMIN SERPL BCP-MCNC: 3.6 G/DL (ref 3.5–5)
ALP SERPL-CCNC: 100 U/L (ref 46–116)
ALT SERPL W P-5'-P-CCNC: 17 U/L (ref 12–78)
ANION GAP SERPL CALCULATED.3IONS-SCNC: 4 MMOL/L (ref 4–13)
AST SERPL W P-5'-P-CCNC: 17 U/L (ref 5–45)
BASOPHILS # BLD AUTO: 0.04 THOUSANDS/ΜL (ref 0–0.1)
BASOPHILS NFR BLD AUTO: 1 % (ref 0–1)
BILIRUB SERPL-MCNC: 1.07 MG/DL (ref 0.2–1)
BUN SERPL-MCNC: 14 MG/DL (ref 5–25)
CALCIUM SERPL-MCNC: 8.7 MG/DL (ref 8.3–10.1)
CHLORIDE SERPL-SCNC: 110 MMOL/L (ref 100–108)
CO2 SERPL-SCNC: 26 MMOL/L (ref 21–32)
CORTIS AM PEAK SERPL-MCNC: 13.3 UG/DL (ref 4.2–22.4)
CREAT SERPL-MCNC: 0.73 MG/DL (ref 0.6–1.3)
EOSINOPHIL # BLD AUTO: 0.11 THOUSAND/ΜL (ref 0–0.61)
EOSINOPHIL NFR BLD AUTO: 2 % (ref 0–6)
ERYTHROCYTE [DISTWIDTH] IN BLOOD BY AUTOMATED COUNT: 12 % (ref 11.6–15.1)
GFR SERPL CREATININE-BSD FRML MDRD: 112 ML/MIN/1.73SQ M
GLUCOSE P FAST SERPL-MCNC: 81 MG/DL (ref 65–99)
HCT VFR BLD AUTO: 40 % (ref 34.8–46.1)
HGB BLD-MCNC: 13.6 G/DL (ref 11.5–15.4)
IMM GRANULOCYTES # BLD AUTO: 0.01 THOUSAND/UL (ref 0–0.2)
IMM GRANULOCYTES NFR BLD AUTO: 0 % (ref 0–2)
LDH SERPL-CCNC: 170 U/L (ref 81–234)
LYMPHOCYTES # BLD AUTO: 1.66 THOUSANDS/ΜL (ref 0.6–4.47)
LYMPHOCYTES NFR BLD AUTO: 30 % (ref 14–44)
MCH RBC QN AUTO: 31.1 PG (ref 26.8–34.3)
MCHC RBC AUTO-ENTMCNC: 34 G/DL (ref 31.4–37.4)
MCV RBC AUTO: 91 FL (ref 82–98)
MONOCYTES # BLD AUTO: 0.69 THOUSAND/ΜL (ref 0.17–1.22)
MONOCYTES NFR BLD AUTO: 13 % (ref 4–12)
NEUTROPHILS # BLD AUTO: 3.01 THOUSANDS/ΜL (ref 1.85–7.62)
NEUTS SEG NFR BLD AUTO: 54 % (ref 43–75)
NRBC BLD AUTO-RTO: 0 /100 WBCS
PLATELET # BLD AUTO: 260 THOUSANDS/UL (ref 149–390)
PMV BLD AUTO: 9.5 FL (ref 8.9–12.7)
POTASSIUM SERPL-SCNC: 3.9 MMOL/L (ref 3.5–5.3)
PROT SERPL-MCNC: 6.7 G/DL (ref 6.4–8.2)
RBC # BLD AUTO: 4.38 MILLION/UL (ref 3.81–5.12)
SODIUM SERPL-SCNC: 140 MMOL/L (ref 136–145)
T3FREE SERPL-MCNC: 2.72 PG/ML (ref 2.3–4.2)
T4 FREE SERPL-MCNC: 0.88 NG/DL (ref 0.76–1.46)
TSH SERPL DL<=0.05 MIU/L-ACNC: 2.53 UIU/ML (ref 0.36–3.74)
WBC # BLD AUTO: 5.52 THOUSAND/UL (ref 4.31–10.16)

## 2021-11-15 PROCEDURE — 83615 LACTATE (LD) (LDH) ENZYME: CPT

## 2021-11-15 PROCEDURE — 84439 ASSAY OF FREE THYROXINE: CPT

## 2021-11-15 PROCEDURE — 85025 COMPLETE CBC W/AUTO DIFF WBC: CPT

## 2021-11-15 PROCEDURE — 84702 CHORIONIC GONADOTROPIN TEST: CPT

## 2021-11-15 PROCEDURE — 84443 ASSAY THYROID STIM HORMONE: CPT

## 2021-11-15 PROCEDURE — 36415 COLL VENOUS BLD VENIPUNCTURE: CPT

## 2021-11-15 PROCEDURE — 84481 FREE ASSAY (FT-3): CPT

## 2021-11-15 PROCEDURE — 82024 ASSAY OF ACTH: CPT

## 2021-11-15 PROCEDURE — 80053 COMPREHEN METABOLIC PANEL: CPT

## 2021-11-15 PROCEDURE — 82533 TOTAL CORTISOL: CPT

## 2021-11-16 DIAGNOSIS — Z79.899 HIGH RISK MEDICATIONS (NOT ANTICOAGULANTS) LONG-TERM USE: Primary | ICD-10-CM

## 2021-11-16 LAB
ACTH PLAS-MCNC: 30.1 PG/ML (ref 7.2–63.3)
B-HCG SERPL-ACNC: <2 MIU/ML

## 2021-11-17 ENCOUNTER — OFFICE VISIT (OUTPATIENT)
Dept: HEMATOLOGY ONCOLOGY | Facility: CLINIC | Age: 31
End: 2021-11-17
Payer: COMMERCIAL

## 2021-11-17 ENCOUNTER — HOSPITAL ENCOUNTER (OUTPATIENT)
Dept: INFUSION CENTER | Facility: CLINIC | Age: 31
Discharge: HOME | End: 2021-11-17
Payer: COMMERCIAL

## 2021-11-17 VITALS
DIASTOLIC BLOOD PRESSURE: 68 MMHG | HEART RATE: 61 BPM | OXYGEN SATURATION: 99 % | RESPIRATION RATE: 16 BRPM | SYSTOLIC BLOOD PRESSURE: 102 MMHG | TEMPERATURE: 97.3 F

## 2021-11-17 VITALS
DIASTOLIC BLOOD PRESSURE: 84 MMHG | TEMPERATURE: 97.1 F | HEART RATE: 65 BPM | HEIGHT: 67 IN | OXYGEN SATURATION: 98 % | WEIGHT: 152 LBS | BODY MASS INDEX: 23.86 KG/M2 | RESPIRATION RATE: 16 BRPM | SYSTOLIC BLOOD PRESSURE: 132 MMHG

## 2021-11-17 DIAGNOSIS — Z29.8 IMMUNOTHERAPY: ICD-10-CM

## 2021-11-17 DIAGNOSIS — Z80.3 FAMILY HISTORY OF BREAST CANCER: ICD-10-CM

## 2021-11-17 DIAGNOSIS — Z79.899 HIGH RISK MEDICATION USE: ICD-10-CM

## 2021-11-17 DIAGNOSIS — C43.61 MALIGNANT MELANOMA OF SHOULDER, RIGHT (HCC): Primary | ICD-10-CM

## 2021-11-17 PROBLEM — Z29.89 IMMUNOTHERAPY: Status: ACTIVE | Noted: 2021-11-17

## 2021-11-17 PROCEDURE — 3008F BODY MASS INDEX DOCD: CPT | Performed by: INTERNAL MEDICINE

## 2021-11-17 PROCEDURE — 96413 CHEMO IV INFUSION 1 HR: CPT

## 2021-11-17 PROCEDURE — 1036F TOBACCO NON-USER: CPT | Performed by: INTERNAL MEDICINE

## 2021-11-17 PROCEDURE — 99214 OFFICE O/P EST MOD 30 MIN: CPT | Performed by: INTERNAL MEDICINE

## 2021-11-17 RX ORDER — SODIUM CHLORIDE 9 MG/ML
20 INJECTION, SOLUTION INTRAVENOUS ONCE
Status: COMPLETED | OUTPATIENT
Start: 2021-11-17 | End: 2021-11-17

## 2021-11-17 RX ORDER — CLINDAMYCIN PHOSPHATE 10 UG/ML
LOTION TOPICAL
COMMUNITY
Start: 2021-10-21

## 2021-11-17 RX ADMIN — SODIUM CHLORIDE 400 MG: 9 INJECTION, SOLUTION INTRAVENOUS at 12:33

## 2021-11-17 RX ADMIN — SODIUM CHLORIDE 20 ML/HR: 0.9 INJECTION, SOLUTION INTRAVENOUS at 12:15

## 2021-11-18 ENCOUNTER — TELEPHONE (OUTPATIENT)
Dept: HEMATOLOGY ONCOLOGY | Facility: CLINIC | Age: 31
End: 2021-11-18

## 2021-12-08 ENCOUNTER — APPOINTMENT (OUTPATIENT)
Dept: LAB | Facility: MEDICAL CENTER | Age: 31
End: 2021-12-08
Payer: COMMERCIAL

## 2021-12-28 ENCOUNTER — APPOINTMENT (OUTPATIENT)
Dept: LAB | Facility: MEDICAL CENTER | Age: 31
End: 2021-12-28
Payer: COMMERCIAL

## 2021-12-28 ENCOUNTER — TELEPHONE (OUTPATIENT)
Dept: HEMATOLOGY ONCOLOGY | Facility: CLINIC | Age: 31
End: 2021-12-28

## 2021-12-29 ENCOUNTER — OFFICE VISIT (OUTPATIENT)
Dept: HEMATOLOGY ONCOLOGY | Facility: CLINIC | Age: 31
End: 2021-12-29
Payer: COMMERCIAL

## 2021-12-29 ENCOUNTER — HOSPITAL ENCOUNTER (OUTPATIENT)
Dept: INFUSION CENTER | Facility: CLINIC | Age: 31
Discharge: HOME | End: 2021-12-29
Payer: COMMERCIAL

## 2021-12-29 VITALS
DIASTOLIC BLOOD PRESSURE: 78 MMHG | TEMPERATURE: 98 F | WEIGHT: 150.5 LBS | OXYGEN SATURATION: 98 % | RESPIRATION RATE: 16 BRPM | SYSTOLIC BLOOD PRESSURE: 136 MMHG | HEIGHT: 67 IN | BODY MASS INDEX: 23.62 KG/M2 | HEART RATE: 65 BPM

## 2021-12-29 VITALS
HEART RATE: 60 BPM | BODY MASS INDEX: 23.54 KG/M2 | WEIGHT: 150 LBS | TEMPERATURE: 98.5 F | DIASTOLIC BLOOD PRESSURE: 78 MMHG | RESPIRATION RATE: 14 BRPM | SYSTOLIC BLOOD PRESSURE: 108 MMHG | HEIGHT: 67 IN | OXYGEN SATURATION: 100 %

## 2021-12-29 DIAGNOSIS — C43.61 MALIGNANT MELANOMA OF SHOULDER, RIGHT (HCC): Primary | ICD-10-CM

## 2021-12-29 DIAGNOSIS — F41.9 ANXIETY: ICD-10-CM

## 2021-12-29 DIAGNOSIS — Z79.899 HIGH RISK MEDICATION USE: ICD-10-CM

## 2021-12-29 DIAGNOSIS — R68.89 EXERCISE INTOLERANCE: ICD-10-CM

## 2021-12-29 DIAGNOSIS — Z29.8 IMMUNOTHERAPY: ICD-10-CM

## 2021-12-29 PROCEDURE — 99214 OFFICE O/P EST MOD 30 MIN: CPT | Performed by: INTERNAL MEDICINE

## 2021-12-29 PROCEDURE — 3008F BODY MASS INDEX DOCD: CPT | Performed by: INTERNAL MEDICINE

## 2021-12-29 PROCEDURE — 96413 CHEMO IV INFUSION 1 HR: CPT

## 2021-12-29 PROCEDURE — 1036F TOBACCO NON-USER: CPT | Performed by: INTERNAL MEDICINE

## 2021-12-29 RX ORDER — SODIUM CHLORIDE 9 MG/ML
20 INJECTION, SOLUTION INTRAVENOUS ONCE
Status: COMPLETED | OUTPATIENT
Start: 2021-12-29 | End: 2021-12-29

## 2021-12-29 RX ORDER — ESCITALOPRAM OXALATE 10 MG/1
10 TABLET ORAL DAILY
Qty: 30 TABLET | Refills: 3 | Status: SHIPPED | OUTPATIENT
Start: 2021-12-29 | End: 2022-04-06

## 2021-12-29 RX ADMIN — SODIUM CHLORIDE 400 MG: 9 INJECTION, SOLUTION INTRAVENOUS at 14:48

## 2021-12-29 RX ADMIN — SODIUM CHLORIDE 20 ML/HR: 0.9 INJECTION, SOLUTION INTRAVENOUS at 14:30

## 2021-12-29 NOTE — PROGRESS NOTES
Pt tolerated treatment well without any adverse reactions  Aware of next appointment 2/9   Declines AVS

## 2022-01-13 ENCOUNTER — TELEPHONE (OUTPATIENT)
Dept: HEMATOLOGY ONCOLOGY | Facility: CLINIC | Age: 32
End: 2022-01-13

## 2022-01-13 NOTE — TELEPHONE ENCOUNTER
Reschedule Appointment     Who is calling in  Patient    Doctor Appointment Scheduled with Dr Karina Castanon date and time 2-9-22 @ 12:40pm    New date and time 2-7-2022 @ 10:40am    Location [place]    Patient verbalized understanding

## 2022-01-18 ENCOUNTER — TELEPHONE (OUTPATIENT)
Dept: SURGICAL ONCOLOGY | Facility: CLINIC | Age: 32
End: 2022-01-18

## 2022-01-19 ENCOUNTER — TELEPHONE (OUTPATIENT)
Dept: SURGICAL ONCOLOGY | Facility: CLINIC | Age: 32
End: 2022-01-19

## 2022-01-21 ENCOUNTER — APPOINTMENT (OUTPATIENT)
Dept: LAB | Facility: MEDICAL CENTER | Age: 32
End: 2022-01-21
Payer: COMMERCIAL

## 2022-01-21 DIAGNOSIS — R68.89 EXERCISE INTOLERANCE: ICD-10-CM

## 2022-01-21 LAB — CORTIS AM PEAK SERPL-MCNC: 13.9 UG/DL (ref 4.2–22.4)

## 2022-01-21 PROCEDURE — 82533 TOTAL CORTISOL: CPT

## 2022-01-21 PROCEDURE — 82024 ASSAY OF ACTH: CPT

## 2022-01-22 LAB — ACTH PLAS-MCNC: 13 PG/ML (ref 7.2–63.3)

## 2022-02-02 ENCOUNTER — TELEPHONE (OUTPATIENT)
Dept: SURGICAL ONCOLOGY | Facility: CLINIC | Age: 32
End: 2022-02-02

## 2022-02-02 ENCOUNTER — APPOINTMENT (OUTPATIENT)
Dept: LAB | Facility: MEDICAL CENTER | Age: 32
End: 2022-02-02
Payer: COMMERCIAL

## 2022-02-02 DIAGNOSIS — Z79.899 HIGH RISK MEDICATION USE: ICD-10-CM

## 2022-02-02 DIAGNOSIS — C43.61 MALIGNANT MELANOMA OF SHOULDER, RIGHT (HCC): ICD-10-CM

## 2022-02-02 LAB
ALBUMIN SERPL BCP-MCNC: 4.2 G/DL (ref 3.5–5)
ALP SERPL-CCNC: 92 U/L (ref 46–116)
ALT SERPL W P-5'-P-CCNC: 22 U/L (ref 12–78)
ANION GAP SERPL CALCULATED.3IONS-SCNC: 4 MMOL/L (ref 4–13)
AST SERPL W P-5'-P-CCNC: 26 U/L (ref 5–45)
B-HCG SERPL-ACNC: <2 MIU/ML
BASOPHILS # BLD AUTO: 0.09 THOUSANDS/ΜL (ref 0–0.1)
BASOPHILS NFR BLD AUTO: 1 % (ref 0–1)
BILIRUB SERPL-MCNC: 1.97 MG/DL (ref 0.2–1)
BUN SERPL-MCNC: 14 MG/DL (ref 5–25)
CALCIUM SERPL-MCNC: 8.7 MG/DL (ref 8.3–10.1)
CHLORIDE SERPL-SCNC: 104 MMOL/L (ref 100–108)
CO2 SERPL-SCNC: 27 MMOL/L (ref 21–32)
CREAT SERPL-MCNC: 0.9 MG/DL (ref 0.6–1.3)
EOSINOPHIL # BLD AUTO: 0.17 THOUSAND/ΜL (ref 0–0.61)
EOSINOPHIL NFR BLD AUTO: 3 % (ref 0–6)
ERYTHROCYTE [DISTWIDTH] IN BLOOD BY AUTOMATED COUNT: 12.3 % (ref 11.6–15.1)
GFR SERPL CREATININE-BSD FRML MDRD: 86 ML/MIN/1.73SQ M
GLUCOSE SERPL-MCNC: 122 MG/DL (ref 65–140)
HCT VFR BLD AUTO: 40.2 % (ref 34.8–46.1)
HGB BLD-MCNC: 13.2 G/DL (ref 11.5–15.4)
IMM GRANULOCYTES # BLD AUTO: 0.01 THOUSAND/UL (ref 0–0.2)
IMM GRANULOCYTES NFR BLD AUTO: 0 % (ref 0–2)
LDH SERPL-CCNC: 241 U/L (ref 81–234)
LYMPHOCYTES # BLD AUTO: 2.23 THOUSANDS/ΜL (ref 0.6–4.47)
LYMPHOCYTES NFR BLD AUTO: 32 % (ref 14–44)
MCH RBC QN AUTO: 30 PG (ref 26.8–34.3)
MCHC RBC AUTO-ENTMCNC: 32.8 G/DL (ref 31.4–37.4)
MCV RBC AUTO: 91 FL (ref 82–98)
MONOCYTES # BLD AUTO: 0.61 THOUSAND/ΜL (ref 0.17–1.22)
MONOCYTES NFR BLD AUTO: 9 % (ref 4–12)
NEUTROPHILS # BLD AUTO: 3.78 THOUSANDS/ΜL (ref 1.85–7.62)
NEUTS SEG NFR BLD AUTO: 55 % (ref 43–75)
NRBC BLD AUTO-RTO: 0 /100 WBCS
PLATELET # BLD AUTO: 253 THOUSANDS/UL (ref 149–390)
PMV BLD AUTO: 8.9 FL (ref 8.9–12.7)
POTASSIUM SERPL-SCNC: 4.5 MMOL/L (ref 3.5–5.3)
PROT SERPL-MCNC: 7.2 G/DL (ref 6.4–8.2)
RBC # BLD AUTO: 4.4 MILLION/UL (ref 3.81–5.12)
SODIUM SERPL-SCNC: 135 MMOL/L (ref 136–145)
T3FREE SERPL-MCNC: 1.25 PG/ML (ref 2.3–4.2)
T4 FREE SERPL-MCNC: 0.62 NG/DL (ref 0.76–1.46)
TSH SERPL DL<=0.05 MIU/L-ACNC: 93.3 UIU/ML (ref 0.36–3.74)
WBC # BLD AUTO: 6.89 THOUSAND/UL (ref 4.31–10.16)

## 2022-02-02 PROCEDURE — 84443 ASSAY THYROID STIM HORMONE: CPT

## 2022-02-02 PROCEDURE — 83615 LACTATE (LD) (LDH) ENZYME: CPT

## 2022-02-02 PROCEDURE — 84439 ASSAY OF FREE THYROXINE: CPT

## 2022-02-02 PROCEDURE — 80053 COMPREHEN METABOLIC PANEL: CPT

## 2022-02-02 PROCEDURE — 36415 COLL VENOUS BLD VENIPUNCTURE: CPT

## 2022-02-02 PROCEDURE — 85025 COMPLETE CBC W/AUTO DIFF WBC: CPT

## 2022-02-02 PROCEDURE — 84481 FREE ASSAY (FT-3): CPT

## 2022-02-02 PROCEDURE — 84702 CHORIONIC GONADOTROPIN TEST: CPT

## 2022-02-04 DIAGNOSIS — C43.61 MALIGNANT MELANOMA OF SHOULDER, RIGHT (HCC): Primary | ICD-10-CM

## 2022-02-07 ENCOUNTER — OFFICE VISIT (OUTPATIENT)
Dept: HEMATOLOGY ONCOLOGY | Facility: CLINIC | Age: 32
End: 2022-02-07
Payer: COMMERCIAL

## 2022-02-07 ENCOUNTER — HOSPITAL ENCOUNTER (OUTPATIENT)
Dept: INFUSION CENTER | Facility: CLINIC | Age: 32
Discharge: HOME | End: 2022-02-07
Payer: COMMERCIAL

## 2022-02-07 VITALS
BODY MASS INDEX: 24.8 KG/M2 | SYSTOLIC BLOOD PRESSURE: 106 MMHG | OXYGEN SATURATION: 93 % | TEMPERATURE: 98.3 F | HEART RATE: 111 BPM | DIASTOLIC BLOOD PRESSURE: 82 MMHG | RESPIRATION RATE: 16 BRPM | HEIGHT: 67 IN | WEIGHT: 158 LBS

## 2022-02-07 VITALS
HEART RATE: 57 BPM | RESPIRATION RATE: 18 BRPM | TEMPERATURE: 98 F | DIASTOLIC BLOOD PRESSURE: 71 MMHG | SYSTOLIC BLOOD PRESSURE: 119 MMHG | OXYGEN SATURATION: 96 %

## 2022-02-07 DIAGNOSIS — R53.83 FATIGUE, UNSPECIFIED TYPE: ICD-10-CM

## 2022-02-07 DIAGNOSIS — C43.61 MALIGNANT MELANOMA OF SHOULDER, RIGHT (HCC): Primary | ICD-10-CM

## 2022-02-07 DIAGNOSIS — E03.9 HYPOTHYROIDISM, UNSPECIFIED TYPE: ICD-10-CM

## 2022-02-07 DIAGNOSIS — Z79.899 HIGH RISK MEDICATION USE: ICD-10-CM

## 2022-02-07 DIAGNOSIS — Z29.8 IMMUNOTHERAPY: ICD-10-CM

## 2022-02-07 PROBLEM — E03.8 OTHER SPECIFIED HYPOTHYROIDISM: Status: ACTIVE | Noted: 2022-02-07

## 2022-02-07 PROBLEM — E03.8 OTHER SPECIFIED HYPOTHYROIDISM: Status: RESOLVED | Noted: 2022-02-07 | Resolved: 2022-02-07

## 2022-02-07 PROCEDURE — 96413 CHEMO IV INFUSION 1 HR: CPT

## 2022-02-07 PROCEDURE — 1036F TOBACCO NON-USER: CPT | Performed by: INTERNAL MEDICINE

## 2022-02-07 PROCEDURE — 3008F BODY MASS INDEX DOCD: CPT | Performed by: INTERNAL MEDICINE

## 2022-02-07 PROCEDURE — 99214 OFFICE O/P EST MOD 30 MIN: CPT | Performed by: INTERNAL MEDICINE

## 2022-02-07 RX ORDER — SODIUM CHLORIDE 9 MG/ML
20 INJECTION, SOLUTION INTRAVENOUS ONCE
Status: COMPLETED | OUTPATIENT
Start: 2022-02-07 | End: 2022-02-07

## 2022-02-07 RX ORDER — LEVOTHYROXINE SODIUM 0.1 MG/1
100 TABLET ORAL DAILY
Qty: 30 TABLET | Refills: 3 | Status: SHIPPED | OUTPATIENT
Start: 2022-02-07 | End: 2022-05-02 | Stop reason: SDUPTHER

## 2022-02-07 RX ADMIN — SODIUM CHLORIDE 400 MG: 9 INJECTION, SOLUTION INTRAVENOUS at 09:46

## 2022-02-07 RX ADMIN — SODIUM CHLORIDE 20 ML/HR: 0.9 INJECTION, SOLUTION INTRAVENOUS at 09:10

## 2022-02-07 NOTE — PROGRESS NOTES
TSH elevated at 93 3, slightly improved from 101 on 1/21  Patient ordered 75 mcg levothyroxine  Spoke with Ayse CARTER, per Dr Florencio Estrada no changes for treatment today  Patient to see   Dr Florencio Estrada after infusion today

## 2022-02-07 NOTE — PROGRESS NOTES
Patient tolerated infusion without complications  Patient aware of next appointment   Patient declined AVS

## 2022-02-07 NOTE — PROGRESS NOTES
Patient is here for   Labs reviewed from 2/2, within treatment parameters  TSH elevated at 93 3  Jessica Boothe RN aware  No changes for treatment today  Pt is currently taking levothyroxine  Patient resting with no complains  Will continue to monitor

## 2022-02-07 NOTE — LETTER
February 7, 2022     Suly Cason, DO  [address] [place] [address]    Patient: Olvin eMrrill   YOB: 1990   Date of Visit: 2/7/2022       Dear Dr Robbie Baxter: Thank you for referring Krystal Reyes to me for evaluation  Below are my notes for this consultation  If you have questions, please do not hesitate to call me  I look forward to following your patient along with you  Sincerely,        Diaz Galvan MD        CC: MD Nadeen Kathleen, [address] [facility], MD  2/7/2022 11:17 AM  Sign when Signing Visit  [address] [place] [address] 3  [address] [place] [address]  [phone]     Date of Visit: 2/7/2022  Name: Olvin Merrill   YOB: 1990       Subjective    VISIT DIAGNOSIS:  Diagnoses and all orders for this visit:    Malignant melanoma of shoulder, right ([facility] [address] )  -     CT soft tissue neck w contrast; Future  -     CT chest abdomen pelvis w contrast; Future    Hypothyroidism, unspecified type  -     levothyroxine (Levoxyl) 100 mcg tablet; Take 1 tablet (100 mcg total) by mouth daily    Fatigue, unspecified type  -     Vitamin D 25 hydroxy;  Future    Immunotherapy    High risk medication use    Other specified hypothyroidism        Oncology History   Malignant melanoma of shoulder, right ([facility] [address] )   7/8/2021 Biopsy    Right anterior shoulder:  - Ulcerated malignant melanoma, 2 5mm    Ulceration: Yes  Mitosis: >1    NGS Testing  MS-Stable  TMB - 13Muts/Mb  CDKN2A E10  TERT promoter -124C>T       7/8/2021 Genomic Testing    DecisionDx - Class 2B     7/8/2021 -  Cancer Staged    Staging form: Melanoma of the Skin, AJCC 8th Edition  - Pathologic stage from 7/8/2021: Stage IIB (pT3b, pN0, cM0) - Signed by Diaz Galvan MD on 9/20/2021  Stage prefix: Initial diagnosis       8/18/2021 Surgery    Wide excision melanoma with sentinel lymph node biopsy  No residual melanoma  LNs negative - 0/9 LNs 11/17/2021 -  Chemotherapy    pembrolizumab (KEYTRUDA) IVPB, 400 mg, Intravenous, Once, 3 of 6 cycles  Administration: 400 mg (11/17/2021), 400 mg (12/29/2021)        Cancer Staging  Malignant melanoma of shoulder, right (HCC)  Staging form: Melanoma of the Skin, AJCC 8th Edition  - Pathologic stage from 7/8/2021: Stage IIB (pT3b, pN0, cM0) - Signed by Nicholas Fan MD on 9/20/2021     Treatment Details   Treatment goal Curative   Plan Name OP Pembrolizumab Q 42 Days   Status Active   Start Date 11/17/2021   End Date 6/13/2022 (Planned)   Provider Nicholas Fan MD   Chemotherapy pembrolizumab [facility]) IVPB, 400 mg, Intravenous, Once, 3 of 6 cycles  Administration: 400 mg (11/17/2021), 400 mg (12/29/2021), 400 mg (2/7/2022)          HISTORY OF PRESENT ILLNESS: Suleiman Rich is a 36 y o  female  who has Stage IIB melanoma on treatment with adjuvant pembrolizumab here for follow up and treatment  She is going ok, but feels tired  Drinking a lot of coffee and getting headaches from that  Her daughter is sick again so decreased sleeping at this point  She is due to see her dermatologist soon  Denies any new, changing, or concerning skin lesions at this time  Denies new LAD  Denies double vision, rash, itching, constipation, and diarrhea  Already received her infusion of pembroliuzmab today         REVIEW OF SYSTEMS:  Review of Systems   Constitutional: Positive for fatigue  Negative for appetite change, fever and unexpected weight change  HENT:   Negative for lump/mass  Eyes: Negative for icterus  Respiratory: Negative for cough, shortness of breath and wheezing  Cardiovascular: Negative for leg swelling  Gastrointestinal: Negative for abdominal pain, constipation, diarrhea, nausea and vomiting  Genitourinary: Negative for difficulty urinating and hematuria  Musculoskeletal: Negative for arthralgias, gait problem and myalgias  Skin: Negative for itching and rash          No new, changing, or concerning lesions  Neurological: Positive for headaches (assoicted with drinking coffee)  Negative for extremity weakness, gait problem, light-headedness and numbness  Hematological: Negative for adenopathy  MEDICATIONS:    Current Outpatient Medications:     clindamycin (CLEOCIN T) 1 % lotion, Apply to face, Disp: , Rfl:     escitalopram (Lexapro) 10 mg tablet, Take 1 tablet (10 mg total) by mouth daily, Disp: 30 tablet, Rfl: 3    levothyroxine (Levoxyl) 100 mcg tablet, Take 1 tablet (100 mcg total) by mouth daily, Disp: 30 tablet, Rfl: 3    norethindrone (MICRONOR) 0 35 MG tablet, Take 0 35 mg by mouth daily, Disp: , Rfl:     pembrolizumab (KEYTRUDA) IVPB, 400 mg IV over 30 minutes every 6 weeks for a total of 9 infusions  , Disp: 400 mg, Rfl: 9  No current facility-administered medications for this visit  ALLERGIES:  Allergies   Allergen Reactions    Strawberry Extract - Food Allergy Swelling        ACTIVE PROBLEMS:  Patient Active Problem List   Diagnosis    ADD (attention deficit disorder) without hyperactivity    COVID-19    Malignant melanoma of shoulder, right ([facility] [address] )    Family history of breast cancer    High risk medication use    Immunotherapy    Other specified hypothyroidism          PAST MEDICAL HISTORY:   Past Medical History:   Diagnosis Date    Lymphoma ([facility] [address] )         PAST SURGICAL HISTORY:  Past Surgical History:   Procedure Laterality Date    LYMPH NODE BIOPSY Right 8/18/2021    Procedure: BIOPSY LYMPH NODE SENTINEL, INTRAOPERATIVE   LYMPHATIC MAPPING, LYMPHOSCINTIGRAPHY (NUC MED INJECT AT 0730);   Surgeon: Adam Tse MD;  [facility];  Service: Surgical Oncology    SKIN LESION EXCISION Right 8/18/2021    Procedure: EXCISION WIDE LESION UPPER EXTREMITY;  Surgeon: Adam Tse MD;  [facility];  Service: Surgical Oncology    SPLIT THICKNESS SKIN GRAFT Right 8/18/2021    Procedure: RIGHT SHOULDER  COMPLEX CLOSURE;  Surgeon: Huong Phillips MD;  [facility];  Service: Plastics    WISDOM TOOTH EXTRACTION          SOCIAL HISTORY:  Social History     Socioeconomic History    Marital status: Single     Spouse name: None    Number of children: None    Years of education: None    Highest education level: None   Occupational History    None   Tobacco Use    Smoking status: Former Smoker     Packs/day: 0 25     Years: 1 00     Pack years: 0 25     Types: Cigarettes     Quit date:      Years since quittin 1    Smokeless tobacco: Never Used    Tobacco comment: about 2-3 years ago    Vaping Use    Vaping Use: Never used   Substance and Sexual Activity    Alcohol use: Yes     Alcohol/week: 1 0 standard drink     Types: 1 Cans of beer per week     Comment: rare    Drug use: No    Sexual activity: Yes     Partners: Male   Other Topics Concern    None   Social History Narrative    Denied history of daily coffee consumption      Social Determinants of Health     Financial Resource Strain: Not on file   Food Insecurity: Not on file   Transportation Needs: Not on file   Physical Activity: Not on file   Stress: Not on file   Social Connections: Not on file   Intimate Partner Violence: Not on file   Housing Stability: Not on file        FAMILY HISTORY:  Family History   Problem Relation Age of Onset    No Known Problems Mother     Breast cancer Maternal Grandmother     Breast cancer Maternal Aunt           Objective    PHYSICAL EXAMINATION:   Blood pressure 106/82, pulse (!) 111, temperature 98 3 °F (36 8 °C), temperature source Tympanic, resp  rate 16, height 5' 7" (1 702 m), weight 71 7 kg (158 lb), SpO2 93 %  Pain Score: 0-No pain     ECOG Performance Status      Most Recent Value   ECOG Performance Status 0 - Fully active, able to carry on all pre-disease performance without restriction             Physical Exam  Constitutional:       General: She is not in acute distress       Appearance: Normal appearance  She is not toxic-appearing  HENT:      Mouth/Throat:      Mouth: Mucous membranes are moist       Pharynx: Oropharynx is clear  Eyes:      General: No scleral icterus  Cardiovascular:      Rate and Rhythm: Normal rate and regular rhythm  Pulses: Normal pulses  Heart sounds: No murmur heard  No friction rub  No gallop  Pulmonary:      Effort: Pulmonary effort is normal  No respiratory distress  Breath sounds: Normal breath sounds  No wheezing or rales  Chest:   Breasts:      Right: No axillary adenopathy or supraclavicular adenopathy  Left: No axillary adenopathy or supraclavicular adenopathy  Abdominal:      General: There is no distension  Palpations: There is no mass  Tenderness: There is no abdominal tenderness  There is no rebound  Musculoskeletal:         General: No swelling or tenderness  Right lower leg: No edema  Left lower leg: No edema  Lymphadenopathy:      Head:      Right side of head: No submandibular, preauricular or posterior auricular adenopathy  Left side of head: No submandibular, preauricular or posterior auricular adenopathy  Cervical: No cervical adenopathy  Right cervical: No superficial or posterior cervical adenopathy  Left cervical: No superficial or posterior cervical adenopathy  Upper Body:      Right upper body: No supraclavicular or axillary adenopathy  Left upper body: No supraclavicular or axillary adenopathy  Skin:     Findings: No rash  Comments: No concerning skin lesions   Neurological:      General: No focal deficit present  Mental Status: She is alert and oriented to person, place, and time  Psychiatric:         Mood and Affect: Mood normal          Behavior: Behavior normal          Thought Content: Thought content normal          Judgment: Judgment normal          I reviewed lab data in the chart      WBC   Date Value Ref Range Status   02/02/2022 6 89 4 31 - 10 16 Thousand/uL Final   01/21/2022 5 54 4 31 - 10 16 Thousand/uL Final   12/28/2021 8 15 4 31 - 10 16 Thousand/uL Final     Hemoglobin   Date Value Ref Range Status   02/02/2022 13 2 11 5 - 15 4 g/dL Final   01/21/2022 14 8 11 5 - 15 4 g/dL Final   12/28/2021 13 9 11 5 - 15 4 g/dL Final     Platelets   Date Value Ref Range Status   02/02/2022 253 149 - 390 Thousands/uL Final   01/21/2022 300 149 - 390 Thousands/uL Final   12/28/2021 288 149 - 390 Thousands/uL Final     MCV   Date Value Ref Range Status   02/02/2022 91 82 - 98 fL Final   01/21/2022 91 82 - 98 fL Final   12/28/2021 89 82 - 98 fL Final      Potassium   Date Value Ref Range Status   02/02/2022 4 5 3 5 - 5 3 mmol/L Final   01/21/2022 4 4 3 5 - 5 3 mmol/L Final   12/28/2021 4 4 3 5 - 5 3 mmol/L Final     Chloride   Date Value Ref Range Status   02/02/2022 104 100 - 108 mmol/L Final   01/21/2022 108 100 - 108 mmol/L Final   12/28/2021 107 100 - 108 mmol/L Final     CO2   Date Value Ref Range Status   02/02/2022 27 21 - 32 mmol/L Final   01/21/2022 27 21 - 32 mmol/L Final   12/28/2021 26 21 - 32 mmol/L Final     BUN   Date Value Ref Range Status   02/02/2022 14 5 - 25 mg/dL Final   01/21/2022 14 5 - 25 mg/dL Final   12/28/2021 13 5 - 25 mg/dL Final     Creatinine   Date Value Ref Range Status   02/02/2022 0 90 0 60 - 1 30 mg/dL Final     Comment:     Standardized to IDMS reference method   01/21/2022 1 11 0 60 - 1 30 mg/dL Final     Comment:     Standardized to IDMS reference method   12/28/2021 0 77 0 60 - 1 30 mg/dL Final     Comment:     Standardized to IDMS reference method     Glucose   Date Value Ref Range Status   02/02/2022 122 65 - 140 mg/dL Final     Comment:     If the patient is fasting, the ADA then defines impaired fasting glucose as > 100 mg/dL and diabetes as > or equal to 123 mg/dL  Specimen collection should occur prior to Sulfasalazine administration due to the potential for falsely depressed results   Specimen collection should occur prior to Sulfapyridine administration due to the potential for falsely elevated results  12/08/2021 88 65 - 140 mg/dL Final     Comment:     If the patient is fasting, the ADA then defines impaired fasting glucose as > 100 mg/dL and diabetes as > or equal to 123 mg/dL  Specimen collection should occur prior to Sulfasalazine administration due to the potential for falsely depressed results  Specimen collection should occur prior to Sulfapyridine administration due to the potential for falsely elevated results  Calcium   Date Value Ref Range Status   02/02/2022 8 7 8 3 - 10 1 mg/dL Final   01/21/2022 8 9 8 3 - 10 1 mg/dL Final   12/28/2021 8 9 8 3 - 10 1 mg/dL Final     Albumin   Date Value Ref Range Status   02/02/2022 4 2 3 5 - 5 0 g/dL Final   01/21/2022 4 0 3 5 - 5 0 g/dL Final   12/28/2021 3 9 3 5 - 5 0 g/dL Final     Total Bilirubin   Date Value Ref Range Status   02/02/2022 1 97 (H) 0 20 - 1 00 mg/dL Final     Comment:     Use of this assay is not recommended for patients undergoing treatment with eltrombopag due to the potential for falsely elevated results  01/21/2022 1 55 (H) 0 20 - 1 00 mg/dL Final     Comment:     Use of this assay is not recommended for patients undergoing treatment with eltrombopag due to the potential for falsely elevated results  12/28/2021 1 37 (H) 0 20 - 1 00 mg/dL Final     Comment:     Use of this assay is not recommended for patients undergoing treatment with eltrombopag due to the potential for falsely elevated results  Alkaline Phosphatase   Date Value Ref Range Status   02/02/2022 92 46 - 116 U/L Final   01/21/2022 72 46 - 116 U/L Final   12/28/2021 81 46 - 116 U/L Final     AST   Date Value Ref Range Status   02/02/2022 26 5 - 45 U/L Final     Comment:     Specimen collection should occur prior to Sulfasalazine administration due to the potential for falsely depressed results      01/21/2022 27 5 - 45 U/L Final     Comment:     Specimen collection should occur prior to Sulfasalazine administration due to the potential for falsely depressed results  12/28/2021 13 5 - 45 U/L Final     Comment:     Specimen collection should occur prior to Sulfasalazine administration due to the potential for falsely depressed results  ALT   Date Value Ref Range Status   02/02/2022 22 12 - 78 U/L Final     Comment:     Specimen collection should occur prior to Sulfasalazine and/or Sulfapyridine administration due to the potential for falsely depressed results  01/21/2022 24 12 - 78 U/L Final     Comment:     Specimen collection should occur prior to Sulfasalazine and/or Sulfapyridine administration due to the potential for falsely depressed results  12/28/2021 19 12 - 78 U/L Final     Comment:     Specimen collection should occur prior to Sulfasalazine and/or Sulfapyridine administration due to the potential for falsely depressed results  LD   Date Value Ref Range Status   02/02/2022 241 (H) 81 - 234 U/L Final   01/21/2022 240 (H) 81 - 234 U/L Final   12/28/2021 190 81 - 234 U/L Final     No results found for: TSH  No results found for: [id number]   Free T4   Date Value Ref Range Status   02/02/2022 0 62 (L) 0 76 - 1 46 ng/dL Final     Comment:     Specimen collection should occur prior to Sulfasalazine administration due to the potential for falsely elevated results  01/21/2022 0 17 (LL) 0 76 - 1 46 ng/dL Final     Comment:     Specimen collection should occur prior to Sulfasalazine administration due to the potential for falsely elevated results  12/28/2021 0 64 (L) 0 76 - 1 46 ng/dL Final     Comment:     Specimen collection should occur prior to Sulfasalazine administration due to the potential for falsely elevated results  RECENT IMAGING:  Due in April        Assessment/Plan  Ms Rivera is a 36 yr female with Stage IIB melanoma on treatment with adjuvatn pembrolizumab her for follow up and treatment - received treatment earlier this morning      1  Malignant melanoma of shoulder, right ([facility] [address] )  Doing well  Continues with dermatology follow up  On adjuvant treatment with pembrolizumab  Labs reviewed and ok for treatment  Cycle # 3 pembrolizumab today  Due for scans in April  Orders placed and scripts provided  She will continue with ban ruiz 3 week blood work  - CT soft tissue neck w contrast; Future  - CT chest abdomen pelvis w contrast; Future    2  Hypothyroidism, unspecified type  3  Fatigue, unspecified type    Associated with treatment, but will check Vit D levels as well  Increase levothyroxine to 100 mcg daily  Script ordered and sent to her pharmacy    - levothyroxine (Levoxyl) 100 mcg tablet; Take 1 tablet (100 mcg total) by mouth daily  Dispense: 30 tablet; Refill: 3  - Vitamin D 25 hydroxy; Future    4  Immunotherapy  5  High risk medication use  She is on treatment with immunotherapy and we will continue to monitor for side effects and lab abnormalities  We will monitor labs with each treatment to ensure safety of continuing on treatment  She knows to watch for signs and symptoms for immune mediated side effects          Emigdio Peres MD, PhD

## 2022-02-07 NOTE — PROGRESS NOTES
[facility]  [address] [facility] [address]  [phone]     Date of Visit: 2/7/2022  Name: Parmjit Sheehan   YOB: 1990       Subjective    VISIT DIAGNOSIS:  Diagnoses and all orders for this visit:    Malignant melanoma of shoulder, right ([address] )  -     CT soft tissue neck w contrast; Future  -     CT chest abdomen pelvis w contrast; Future    Hypothyroidism, unspecified type  -     levothyroxine (Levoxyl) 100 mcg tablet; Take 1 tablet (100 mcg total) by mouth daily    Fatigue, unspecified type  -     Vitamin D 25 hydroxy;  Future    Immunotherapy    High risk medication use    Other specified hypothyroidism        Oncology History   Malignant melanoma of shoulder, right ([address] )   7/8/2021 Biopsy    Right anterior shoulder:  - Ulcerated malignant melanoma, 2 5mm    Ulceration: Yes  Mitosis: >1    NGS Testing  MS-Stable  TMB - 13Muts/Mb  CDKN2A E10  TERT promoter -124C>T       7/8/2021 Genomic Testing    DecisionDx - Class 2B     7/8/2021 -  Cancer Staged    Staging form: Melanoma of the Skin, AJCC 8th Edition  - Pathologic stage from 7/8/2021: Stage IIB (pT3b, pN0, cM0) - Signed by Brigette Manzano MD on 9/20/2021  Stage prefix: Initial diagnosis       8/18/2021 Surgery    Wide excision melanoma with sentinel lymph node biopsy  No residual melanoma  LNs negative - 0/9 LNs     11/17/2021 -  Chemotherapy    pembrolizumab (KEYTRUDA) IVPB, 400 mg, Intravenous, Once, 3 of 6 cycles  Administration: 400 mg (11/17/2021), 400 mg (12/29/2021)        Cancer Staging  Malignant melanoma of shoulder, right (HCC)  Staging form: Melanoma of the Skin, AJCC 8th Edition  - Pathologic stage from 7/8/2021: Stage IIB (pT3b, pN0, cM0) - Signed by Brigette Manzano MD on 9/20/2021     Treatment Details   Treatment goal Curative   Plan Name OP Pembrolizumab Q 42 Days   Status Active   Start Date 11/17/2021   End Date 6/13/2022 (Planned)   Provider Brigette Manzano MD Chemotherapy pembrolizumab (KEYTRUDA) IVPB, 400 mg, Intravenous, Once, 3 of 6 cycles  Administration: 400 mg (11/17/2021), 400 mg (12/29/2021), 400 mg (2/7/2022)          HISTORY OF PRESENT ILLNESS: Addison Sandhu is a 36 y o  female  who has Stage IIB melanoma on treatment with adjuvant pembrolizumab here for follow up and treatment  She is going ok, but feels tired  Drinking a lot of coffee and getting headaches from that  Her daughter is sick again so decreased sleeping at this point  She is due to see her dermatologist soon  Denies any new, changing, or concerning skin lesions at this time  Denies new LAD  Denies double vision, rash, itching, constipation, and diarrhea  Already received her infusion of pembroliuzmab today         REVIEW OF SYSTEMS:  Review of Systems   Constitutional: Positive for fatigue  Negative for appetite change, fever and unexpected weight change  HENT:   Negative for lump/mass  Eyes: Negative for icterus  Respiratory: Negative for cough, shortness of breath and wheezing  Cardiovascular: Negative for leg swelling  Gastrointestinal: Negative for abdominal pain, constipation, diarrhea, nausea and vomiting  Genitourinary: Negative for difficulty urinating and hematuria  Musculoskeletal: Negative for arthralgias, gait problem and myalgias  Skin: Negative for itching and rash  No new, changing, or concerning lesions  Neurological: Positive for headaches (assoicted with drinking coffee)  Negative for extremity weakness, gait problem, light-headedness and numbness  Hematological: Negative for adenopathy          MEDICATIONS:    Current Outpatient Medications:     clindamycin (CLEOCIN T) 1 % lotion, Apply to face, Disp: , Rfl:     escitalopram (Lexapro) 10 mg tablet, Take 1 tablet (10 mg total) by mouth daily, Disp: 30 tablet, Rfl: 3    levothyroxine (Levoxyl) 100 mcg tablet, Take 1 tablet (100 mcg total) by mouth daily, Disp: 30 tablet, Rfl: 3   norethindrone (MICRONOR) 0 35 MG tablet, Take 0 35 mg by mouth daily, Disp: , Rfl:     pembrolizumab (KEYTRUDA) IVPB, 400 mg IV over 30 minutes every 6 weeks for a total of 9 infusions  , Disp: 400 mg, Rfl: 9  No current facility-administered medications for this visit  ALLERGIES:  Allergies   Allergen Reactions    Strawberry Extract - Food Allergy Swelling        ACTIVE PROBLEMS:  Patient Active Problem List   Diagnosis    ADD (attention deficit disorder) without hyperactivity    COVID-19    [facility]    Family history of breast cancer    High risk medication use    Immunotherapy    Other specified hypothyroidism          PAST MEDICAL HISTORY:   Past Medical History:   Diagnosis Date    Lymphoma ([facility] )         PAST SURGICAL HISTORY:  Past Surgical History:   Procedure Laterality Date    LYMPH NODE BIOPSY Right 2021    Procedure: BIOPSY LYMPH NODE SENTINEL, INTRAOPERATIVE   LYMPHATIC MAPPING, LYMPHOSCINTIGRAPHY (NUC MED INJECT AT 0730);   Surgeon: Ben Porras MD;  Location: [facility];  Service: Surgical Oncology    SKIN LESION EXCISION Right 2021    Procedure: EXCISION WIDE LESION UPPER EXTREMITY;  Surgeon: Ben Porras MD;  [facility];  Service: Surgical Oncology    SPLIT THICKNESS SKIN GRAFT Right 2021    Procedure: RIGHT SHOULDER  COMPLEX CLOSURE;  Surgeon: Saulo Gomez MD;  Location: [facility];  Service: Plastics    WISDOM TOOTH EXTRACTION          SOCIAL HISTORY:  Social History     Socioeconomic History    Marital status: Single     Spouse name: None    Number of children: None    Years of education: None    Highest education level: None   Occupational History    None   Tobacco Use    Smoking status: Former Smoker     Packs/day: 0 25     Years: 1 00     Pack years: 0 25     Types: Cigarettes     Quit date:      Years since quittin 1    Smokeless tobacco: Never Used    Tobacco comment: about 2-3 years ago    Vaping Use    Vaping Use: Never used   Substance and Sexual Activity    Alcohol use: Yes     Alcohol/week: 1 0 standard drink     Types: 1 Cans of beer per week     Comment: rare    Drug use: No    Sexual activity: Yes     Partners: Male   Other Topics Concern    None   Social History Narrative    Denied history of daily coffee consumption      Social Determinants of Health     Financial Resource Strain: Not on file   Food Insecurity: Not on file   Transportation Needs: Not on file   Physical Activity: Not on file   Stress: Not on file   Social Connections: Not on file   Intimate Partner Violence: Not on file   Housing Stability: Not on file        FAMILY HISTORY:  Family History   Problem Relation Age of Onset    No Known Problems Mother     Breast cancer Maternal Grandmother     Breast cancer Maternal Aunt           Objective    PHYSICAL EXAMINATION:   Blood pressure 106/82, pulse (!) 111, temperature 98 3 °F (36 8 °C), temperature source Tympanic, resp  rate 16, height 5' 7" (1 702 m), weight 71 7 kg (158 lb), SpO2 93 %  Pain Score: 0-No pain     ECOG Performance Status      Most Recent Value   ECOG Performance Status 0 - Fully active, able to carry on all pre-disease performance without restriction             Physical Exam  Constitutional:       General: She is not in acute distress  Appearance: Normal appearance  She is not toxic-appearing  HENT:      Mouth/Throat:      Mouth: Mucous membranes are moist       Pharynx: Oropharynx is clear  Eyes:      General: No scleral icterus  Cardiovascular:      Rate and Rhythm: Normal rate and regular rhythm  Pulses: Normal pulses  Heart sounds: No murmur heard  No friction rub  No gallop  Pulmonary:      Effort: Pulmonary effort is normal  No respiratory distress  Breath sounds: Normal breath sounds  No wheezing or rales  Chest:   Breasts:      Right: No axillary adenopathy or supraclavicular adenopathy        Left: No axillary adenopathy or supraclavicular adenopathy  Abdominal:      General: There is no distension  Palpations: There is no mass  Tenderness: There is no abdominal tenderness  There is no rebound  Musculoskeletal:         General: No swelling or tenderness  Right lower leg: No edema  Left lower leg: No edema  Lymphadenopathy:      Head:      Right side of head: No submandibular, preauricular or posterior auricular adenopathy  Left side of head: No submandibular, preauricular or posterior auricular adenopathy  Cervical: No cervical adenopathy  Right cervical: No superficial or posterior cervical adenopathy  Left cervical: No superficial or posterior cervical adenopathy  Upper Body:      Right upper body: No supraclavicular or axillary adenopathy  Left upper body: No supraclavicular or axillary adenopathy  Skin:     Findings: No rash  Comments: No concerning skin lesions   Neurological:      General: No focal deficit present  Mental Status: She is alert and oriented to person, place, and time  Psychiatric:         Mood and Affect: Mood normal          Behavior: Behavior normal          Thought Content: Thought content normal          Judgment: Judgment normal          I reviewed lab data in the chart      WBC   Date Value Ref Range Status   02/02/2022 6 89 4 31 - 10 16 Thousand/uL Final   01/21/2022 5 54 4 31 - 10 16 Thousand/uL Final   12/28/2021 8 15 4 31 - 10 16 Thousand/uL Final     Hemoglobin   Date Value Ref Range Status   02/02/2022 13 2 11 5 - 15 4 g/dL Final   01/21/2022 14 8 11 5 - 15 4 g/dL Final   12/28/2021 13 9 11 5 - 15 4 g/dL Final     Platelets   Date Value Ref Range Status   02/02/2022 253 149 - 390 Thousands/uL Final   01/21/2022 300 149 - 390 Thousands/uL Final   12/28/2021 288 149 - 390 Thousands/uL Final     MCV   Date Value Ref Range Status   02/02/2022 91 82 - 98 fL Final   01/21/2022 91 82 - 98 fL Final   12/28/2021 89 82 - 98 fL Final      Potassium   Date Value Ref Range Status   02/02/2022 4 5 3 5 - 5 3 mmol/L Final   01/21/2022 4 4 3 5 - 5 3 mmol/L Final   12/28/2021 4 4 3 5 - 5 3 mmol/L Final     Chloride   Date Value Ref Range Status   02/02/2022 104 100 - 108 mmol/L Final   01/21/2022 108 100 - 108 mmol/L Final   12/28/2021 107 100 - 108 mmol/L Final     CO2   Date Value Ref Range Status   02/02/2022 27 21 - 32 mmol/L Final   01/21/2022 27 21 - 32 mmol/L Final   12/28/2021 26 21 - 32 mmol/L Final     BUN   Date Value Ref Range Status   02/02/2022 14 5 - 25 mg/dL Final   01/21/2022 14 5 - 25 mg/dL Final   12/28/2021 13 5 - 25 mg/dL Final     Creatinine   Date Value Ref Range Status   02/02/2022 0 90 0 60 - 1 30 mg/dL Final     Comment:     Standardized to IDMS reference method   01/21/2022 1 11 0 60 - 1 30 mg/dL Final     Comment:     Standardized to IDMS reference method   12/28/2021 0 77 0 60 - 1 30 mg/dL Final     Comment:     Standardized to IDMS reference method     Glucose   Date Value Ref Range Status   02/02/2022 122 65 - 140 mg/dL Final     Comment:     If the patient is fasting, the ADA then defines impaired fasting glucose as > 100 mg/dL and diabetes as > or equal to 123 mg/dL  Specimen collection should occur prior to Sulfasalazine administration due to the potential for falsely depressed results  Specimen collection should occur prior to Sulfapyridine administration due to the potential for falsely elevated results  12/08/2021 88 65 - 140 mg/dL Final     Comment:     If the patient is fasting, the ADA then defines impaired fasting glucose as > 100 mg/dL and diabetes as > or equal to 123 mg/dL  Specimen collection should occur prior to Sulfasalazine administration due to the potential for falsely depressed results  Specimen collection should occur prior to Sulfapyridine administration due to the potential for falsely elevated results       Calcium   Date Value Ref Range Status   02/02/2022 8 7 8 3 - 10 1 mg/dL Final   01/21/2022 8 9 8 3 - 10 1 mg/dL Final   12/28/2021 8 9 8 3 - 10 1 mg/dL Final     Albumin   Date Value Ref Range Status   02/02/2022 4 2 3 5 - 5 0 g/dL Final   01/21/2022 4 0 3 5 - 5 0 g/dL Final   12/28/2021 3 9 3 5 - 5 0 g/dL Final     Total Bilirubin   Date Value Ref Range Status   02/02/2022 1 97 (H) 0 20 - 1 00 mg/dL Final     Comment:     Use of this assay is not recommended for patients undergoing treatment with eltrombopag due to the potential for falsely elevated results  01/21/2022 1 55 (H) 0 20 - 1 00 mg/dL Final     Comment:     Use of this assay is not recommended for patients undergoing treatment with eltrombopag due to the potential for falsely elevated results  12/28/2021 1 37 (H) 0 20 - 1 00 mg/dL Final     Comment:     Use of this assay is not recommended for patients undergoing treatment with eltrombopag due to the potential for falsely elevated results  Alkaline Phosphatase   Date Value Ref Range Status   02/02/2022 92 46 - 116 U/L Final   01/21/2022 72 46 - 116 U/L Final   12/28/2021 81 46 - 116 U/L Final     AST   Date Value Ref Range Status   02/02/2022 26 5 - 45 U/L Final     Comment:     Specimen collection should occur prior to Sulfasalazine administration due to the potential for falsely depressed results  01/21/2022 27 5 - 45 U/L Final     Comment:     Specimen collection should occur prior to Sulfasalazine administration due to the potential for falsely depressed results  12/28/2021 13 5 - 45 U/L Final     Comment:     Specimen collection should occur prior to Sulfasalazine administration due to the potential for falsely depressed results  ALT   Date Value Ref Range Status   02/02/2022 22 12 - 78 U/L Final     Comment:     Specimen collection should occur prior to Sulfasalazine and/or Sulfapyridine administration due to the potential for falsely depressed results      01/21/2022 24 12 - 78 U/L Final     Comment:     Specimen collection should occur prior to Sulfasalazine and/or Sulfapyridine administration due to the potential for falsely depressed results  12/28/2021 19 12 - 78 U/L Final     Comment:     Specimen collection should occur prior to Sulfasalazine and/or Sulfapyridine administration due to the potential for falsely depressed results  LD   Date Value Ref Range Status   02/02/2022 241 (H) 81 - 234 U/L Final   01/21/2022 240 (H) 81 - 234 U/L Final   12/28/2021 190 81 - 234 U/L Final     No results found for: TSH  No results found for: [id number]   Free T4   Date Value Ref Range Status   02/02/2022 0 62 (L) 0 76 - 1 46 ng/dL Final     Comment:     Specimen collection should occur prior to Sulfasalazine administration due to the potential for falsely elevated results  01/21/2022 0 17 (LL) 0 76 - 1 46 ng/dL Final     Comment:     Specimen collection should occur prior to Sulfasalazine administration due to the potential for falsely elevated results  12/28/2021 0 64 (L) 0 76 - 1 46 ng/dL Final     Comment:     Specimen collection should occur prior to Sulfasalazine administration due to the potential for falsely elevated results  RECENT IMAGING:  Due in April        Assessment/Plan  Ms Rivera is a 36 yr female with Stage IIB melanoma on treatment with adjuvatn pembrolizumab her for follow up and treatment - received treatment earlier this morning  1  Malignant melanoma of shoulder, right ([facility] )  Doing well  Continues with dermatology follow up  On adjuvant treatment with pembrolizumab  Labs reviewed and ok for treatment  Cycle # 3 pembrolizumab today  Due for scans in April  Orders placed and scripts provided  She will continue with ban ruiz 3 week blood work  - CT soft tissue neck w contrast; Future  - CT chest abdomen pelvis w contrast; Future    2  Hypothyroidism, unspecified type  3  Fatigue, unspecified type    Associated with treatment, but will check Vit D levels as well  Increase levothyroxine to 100 mcg daily    Script ordered and sent to her pharmacy    - levothyroxine (Levoxyl) 100 mcg tablet; Take 1 tablet (100 mcg total) by mouth daily  Dispense: 30 tablet; Refill: 3  - Vitamin D 25 hydroxy; Future    4  Immunotherapy  5  High risk medication use  She is on treatment with immunotherapy and we will continue to monitor for side effects and lab abnormalities  We will monitor labs with each treatment to ensure safety of continuing on treatment  She knows to watch for signs and symptoms for immune mediated side effects          Luis Antonio Trevizo MD, PhD

## 2022-02-24 NOTE — TELEPHONE ENCOUNTER
02/24 After receiving e mail from [facility] pay assistance on 02/22 stating that patient has been enrolled in [address] assistance program for [facility]  Maximum benefit of   $25,000 in a calendar year  01/01/22 - 12/31/22  Toward the patient's out of pocket cost for the medication  Sending e mail to patient with details of the enrollment  Any questions they can e mail or call me

## 2022-03-04 ENCOUNTER — APPOINTMENT (OUTPATIENT)
Dept: LAB | Facility: MEDICAL CENTER | Age: 32
End: 2022-03-04
Payer: COMMERCIAL

## 2022-03-17 ENCOUNTER — TELEPHONE (OUTPATIENT)
Dept: HEMATOLOGY ONCOLOGY | Facility: CLINIC | Age: 32
End: 2022-03-17

## 2022-03-17 NOTE — TELEPHONE ENCOUNTER
Left detailed message reminding patient to have blood work done prior to infusion and appointment on Monday

## 2022-03-18 ENCOUNTER — APPOINTMENT (OUTPATIENT)
Dept: LAB | Facility: MEDICAL CENTER | Age: 32
End: 2022-03-18
Payer: COMMERCIAL

## 2022-03-18 DIAGNOSIS — R53.83 FATIGUE, UNSPECIFIED TYPE: ICD-10-CM

## 2022-03-18 LAB — 25(OH)D3 SERPL-MCNC: 22.3 NG/ML (ref 30–100)

## 2022-03-18 PROCEDURE — 82306 VITAMIN D 25 HYDROXY: CPT

## 2022-03-21 ENCOUNTER — HOSPITAL ENCOUNTER (OUTPATIENT)
Dept: INFUSION CENTER | Facility: CLINIC | Age: 32
Discharge: HOME | End: 2022-03-21
Payer: COMMERCIAL

## 2022-03-21 ENCOUNTER — OFFICE VISIT (OUTPATIENT)
Dept: HEMATOLOGY ONCOLOGY | Facility: CLINIC | Age: 32
End: 2022-03-21
Payer: COMMERCIAL

## 2022-03-21 VITALS
SYSTOLIC BLOOD PRESSURE: 100 MMHG | HEART RATE: 69 BPM | WEIGHT: 155.5 LBS | DIASTOLIC BLOOD PRESSURE: 58 MMHG | HEIGHT: 67 IN | RESPIRATION RATE: 18 BRPM | BODY MASS INDEX: 24.4 KG/M2 | OXYGEN SATURATION: 99 % | TEMPERATURE: 97.4 F

## 2022-03-21 VITALS
OXYGEN SATURATION: 98 % | DIASTOLIC BLOOD PRESSURE: 62 MMHG | HEIGHT: 67 IN | SYSTOLIC BLOOD PRESSURE: 104 MMHG | TEMPERATURE: 97.8 F | BODY MASS INDEX: 24.25 KG/M2 | WEIGHT: 154.5 LBS | RESPIRATION RATE: 18 BRPM | HEART RATE: 60 BPM

## 2022-03-21 DIAGNOSIS — R53.83 FATIGUE, UNSPECIFIED TYPE: ICD-10-CM

## 2022-03-21 DIAGNOSIS — Z29.8 IMMUNOTHERAPY: ICD-10-CM

## 2022-03-21 DIAGNOSIS — E03.9 HYPOTHYROIDISM, UNSPECIFIED TYPE: ICD-10-CM

## 2022-03-21 DIAGNOSIS — Z79.899 HIGH RISK MEDICATION USE: ICD-10-CM

## 2022-03-21 DIAGNOSIS — C43.61 MALIGNANT MELANOMA OF SHOULDER, RIGHT (HCC): Primary | ICD-10-CM

## 2022-03-21 DIAGNOSIS — E55.9 VITAMIN D INSUFFICIENCY: ICD-10-CM

## 2022-03-21 PROCEDURE — 99214 OFFICE O/P EST MOD 30 MIN: CPT

## 2022-03-21 PROCEDURE — 3008F BODY MASS INDEX DOCD: CPT

## 2022-03-21 PROCEDURE — 1036F TOBACCO NON-USER: CPT

## 2022-03-21 PROCEDURE — 96413 CHEMO IV INFUSION 1 HR: CPT

## 2022-03-21 RX ORDER — LEVOTHYROXINE SODIUM 0.03 MG/1
25 TABLET ORAL DAILY
Qty: 30 TABLET | Refills: 0 | Status: SHIPPED | OUTPATIENT
Start: 2022-03-21 | End: 2022-04-18 | Stop reason: SDUPTHER

## 2022-03-21 RX ORDER — SODIUM CHLORIDE 9 MG/ML
20 INJECTION, SOLUTION INTRAVENOUS ONCE
Status: COMPLETED | OUTPATIENT
Start: 2022-03-21 | End: 2022-03-21

## 2022-03-21 RX ADMIN — SODIUM CHLORIDE 400 MG: 9 INJECTION, SOLUTION INTRAVENOUS at 13:53

## 2022-03-21 RX ADMIN — SODIUM CHLORIDE 20 ML/HR: 0.9 INJECTION, SOLUTION INTRAVENOUS at 13:52

## 2022-03-21 NOTE — PROGRESS NOTES
[facility] [id number]  Progress Note  Kalina Rivera, 1990, [id number]  3/21/2022    Assessment/Plan:  1  Malignant melanoma of shoulder, right ([facility] [address] )  Ms Salvador Marin is a 37-year old female with stage IIB melanoma on adjuvant treatment with pembrolizumab every 6 weeks here for follow up prior to cycle  #4  She is doing well and tolerating treatment fine  Labs reviewed and okay for treatment today  She is scheduled for CT scans 4/1/22 and we will call here with those results when they are available to us  She no longer needed every 3 week labs and will just get labs drawn the week prior to her next infusion  2  High risk medication use  3  Immunotherapy  She is on treatment with immunotherapy and we will continue to monitor for side effects at lab abnormalities  We will monitor labs with each treatment to ensure safety of continuing on treatment  She knows to watch for signs and symptoms for immune mediated side effects  Denies any immune mediated side effects at this time  4  Hypothyroidism, unspecified type  5  Fatigue, unspecified type  6  Vitamin D Insufficiency   She continues with increased fatigue  Her TSH increased from 5 130 to 19 5  She is taking the synthroid appropriately and has not missed any doses besides 1 a few weeks ago when she had the stomach bug and was too nauseous  She just refilled the 100 mcg tablets, so I sent in for 25 mcg tablets and she will take 125 mcg total daily  We will recheck the TSH with labs in 6 weeks and adjust as needed  She also has Vitamin D insufficiency, 22 3  She will start taking 600 IU of vitamin D daily  This could also be contributing to her fatigue     - levothyroxine (Synthroid) 25 mcg tablet; Take 1 tablet (25 mcg total) by mouth daily  Dispense: 30 tablet;  Refill: 0      The patient is scheduled for follow-up in approximately 6 weeks with Dr Andria Vazquez with next infusion  Patient voiced agreement and understanding to the above  Patient knows to call the Hematology/Oncology office with any questions and concerns regarding the above     -------------------------------------------------------------------------------------------------------    Chief Complaint   Patient presents with    Follow-up       History of present illness:   Simran Murray is a 61-year-old female with stage IIB melanoma on adjuvant treatment with pembrolizumab here for follow up prior to treatment with cycle #4  She is doing well and feels good  She still feels tired requiring more coffee than usual  She also gets headaches from the caffeine  Cancer History:   Oncology History   Malignant melanoma of shoulder, right ([address] )   2021 Biopsy    Right anterior shoulder:  - Ulcerated malignant melanoma, 2 5mm    Ulceration: Yes  Mitosis: >1    NGS Testing  MS-Stable  TMB - 13Muts/Mb  CDKN2A E10  TERT promoter -124C>T       2021 Genomic Testing    DecisionDx - Class 2B     2021 -  Cancer Staged    Staging form: Melanoma of the Skin, AJCC 8th Edition  - Pathologic stage from 2021: Stage IIB (pT3b, pN0, cM0) - Signed by Lucero Amaya MD on 2021  Stage prefix: Initial diagnosis       2021 Surgery    Wide excision melanoma with sentinel lymph node biopsy  No residual melanoma  LNs negative - 0/9 LNs     2021 -  Chemotherapy    pembrolizumab (KEYTRUDA) IVPB, 400 mg, Intravenous, Once, 3 of 6 cycles  Administration: 400 mg (2021), 400 mg (2021), 400 mg (2022)          Cancer Staging  Malignant melanoma of shoulder, right (HCC)  Staging form: Melanoma of the Skin, AJCC 8th Edition  - Pathologic stage from 2021: Stage IIB (pT3b, pN0, cM0) - Signed by Lucero Amaya MD on 2021       ECO - Asymptomatic     Review of Systems   Constitutional: Positive for fatigue   Negative for activity change, appetite change, fever and unexpected weight change  HENT: Negative for trouble swallowing and voice change  Eyes: Negative for photophobia and visual disturbance  Respiratory: Negative for cough, chest tightness, shortness of breath and wheezing  Cardiovascular: Negative for chest pain, palpitations and leg swelling  Gastrointestinal: Positive for constipation  Negative for abdominal distention, abdominal pain, diarrhea, nausea and vomiting  Endocrine: Negative for cold intolerance and heat intolerance  Genitourinary: Negative for dysuria, hematuria and urgency  Musculoskeletal: Negative for arthralgias, back pain, gait problem, joint swelling and myalgias  Skin: Positive for rash (poison ivy on left hip and buttock)  Negative for color change and pallor  No new, changing, or concerning lesions  Neurological: Positive for headaches  Negative for dizziness, tremors, syncope, weakness, light-headedness and numbness  Hematological: Negative for adenopathy  Does not bruise/bleed easily  Psychiatric/Behavioral: Negative for confusion and sleep disturbance  Current Outpatient Medications:     clindamycin (CLEOCIN T) 1 % lotion, Apply to face, Disp: , Rfl:     escitalopram (Lexapro) 10 mg tablet, Take 1 tablet (10 mg total) by mouth daily, Disp: 30 tablet, Rfl: 3    levothyroxine (Levoxyl) 100 mcg tablet, Take 1 tablet (100 mcg total) by mouth daily, Disp: 30 tablet, Rfl: 3    norethindrone (MICRONOR) 0 35 MG tablet, Take 0 35 mg by mouth daily, Disp: , Rfl:     pembrolizumab (KEYTRUDA) IVPB, 400 mg IV over 30 minutes every 6 weeks for a total of 9 infusions  , Disp: 400 mg, Rfl: 9    tretinoin (RETIN-A) 0 025 % cream, APPLY A PEA-SIZED AMOUNT TO FACE AT NIGHT , Disp: , Rfl:     levothyroxine (Synthroid) 25 mcg tablet, Take 1 tablet (25 mcg total) by mouth daily, Disp: 30 tablet, Rfl: 0    Allergies   Allergen Reactions    Strawberry Extract - Food Allergy Swelling       Objective:   /58 (BP Location: Left arm, Patient Position: Sitting, Cuff Size: Adult)   Pulse 69   Temp (!) 97 4 °F (36 3 °C)   Resp 18   Ht 5' 7" (1 702 m)   Wt 70 5 kg (155 lb 8 oz)   SpO2 99%   BMI 24 35 kg/m²   Wt Readings from Last 6 Encounters:   03/21/22 70 1 kg (154 lb 8 oz)   03/21/22 70 5 kg (155 lb 8 oz)   02/07/22 71 7 kg (158 lb)   12/29/21 68 kg (150 lb)   12/29/21 68 3 kg (150 lb 8 oz)   11/17/21 68 9 kg (152 lb)       Physical Exam  Constitutional:       General: She is not in acute distress  Appearance: Normal appearance  She is normal weight  She is not ill-appearing  HENT:      Head: Atraumatic  Eyes:      Extraocular Movements: Extraocular movements intact  Conjunctiva/sclera: Conjunctivae normal    Cardiovascular:      Rate and Rhythm: Normal rate and regular rhythm  Pulses: Normal pulses  Heart sounds: Normal heart sounds  No murmur heard  Pulmonary:      Effort: Pulmonary effort is normal  No respiratory distress  Breath sounds: Normal breath sounds  No wheezing  Chest:   Breasts:      Right: No axillary adenopathy  Left: No axillary adenopathy  Abdominal:      General: Abdomen is flat  Bowel sounds are normal  There is no distension  Palpations: Abdomen is soft  Tenderness: There is no abdominal tenderness  Musculoskeletal:      Cervical back: Neck supple  No tenderness  Right lower leg: No edema  Left lower leg: No edema  Lymphadenopathy:      Head:      Right side of head: No preauricular or posterior auricular adenopathy  Left side of head: No preauricular or posterior auricular adenopathy  Cervical: No cervical adenopathy  Right cervical: No superficial cervical adenopathy  Left cervical: No superficial cervical adenopathy  Upper Body:      Right upper body: No axillary adenopathy  Left upper body: No axillary adenopathy  Skin:     General: Skin is warm and dry        Capillary Refill: Capillary refill takes less than 2 seconds  Coloration: Skin is not pale  Findings: Rash (left hip and buttocks, "poison-ivy") present  No bruising or lesion  Neurological:      General: No focal deficit present  Mental Status: She is alert and oriented to person, place, and time  Mental status is at baseline  Motor: No weakness  Gait: Gait normal    Psychiatric:         Mood and Affect: Mood normal          Behavior: Behavior normal          Thought Content: Thought content normal          Judgment: Judgment normal          Goals and Barriers:    Current Goal:   Prolong Survival from Cancer  Barriers: None  Patient's Capacity to Self Care:  Patient able to self care      Pertinent Laboratory Results and Imaging Review:  Appointment on 03/18/2022   Component Date Value Ref Range Status    Vit D, 25-Hydroxy 03/18/2022 22 3* 30 0 - 100 0 ng/mL Final   Ancillary Orders on 03/18/2022   Component Date Value Ref Range Status    WBC 03/18/2022 6 26  4 31 - 10 16 Thousand/uL Final    RBC 03/18/2022 4 34  3 81 - 5 12 Million/uL Final    Hemoglobin 03/18/2022 13 4  11 5 - 15 4 g/dL Final    Hematocrit 03/18/2022 38 6  34 8 - 46 1 % Final    MCV 03/18/2022 89  82 - 98 fL Final    MCH 03/18/2022 30 9  26 8 - 34 3 pg Final    MCHC 03/18/2022 34 7  31 4 - 37 4 g/dL Final    RDW 03/18/2022 12 5  11 6 - 15 1 % Final    MPV 03/18/2022 9 2  8 9 - 12 7 fL Final    Platelets 33/88/4169 320  149 - 390 Thousands/uL Final    nRBC 03/18/2022 0  /100 WBCs Final    Neutrophils Relative 03/18/2022 56  43 - 75 % Final    Immat GRANS % 03/18/2022 0  0 - 2 % Final    Lymphocytes Relative 03/18/2022 32  14 - 44 % Final    Monocytes Relative 03/18/2022 8  4 - 12 % Final    Eosinophils Relative 03/18/2022 3  0 - 6 % Final    Basophils Relative 03/18/2022 1  0 - 1 % Final    Neutrophils Absolute 03/18/2022 3 50  1 85 - 7 62 Thousands/µL Final    Immature Grans Absolute 03/18/2022 0 01  0 00 - 0 20 Thousand/uL Final    Lymphocytes Absolute 03/18/2022 1 98  0 60 - 4 47 Thousands/µL Final    Monocytes Absolute 03/18/2022 0 50  0 17 - 1 22 Thousand/µL Final    Eosinophils Absolute 03/18/2022 0 21  0 00 - 0 61 Thousand/µL Final    Basophils Absolute 03/18/2022 0 06  0 00 - 0 10 Thousands/µL Final    Sodium 03/18/2022 141  136 - 145 mmol/L Final    Potassium 03/18/2022 4 4  3 5 - 5 3 mmol/L Final    Chloride 03/18/2022 112* 100 - 108 mmol/L Final    CO2 03/18/2022 25  21 - 32 mmol/L Final    ANION GAP 03/18/2022 4  4 - 13 mmol/L Final    BUN 03/18/2022 12  5 - 25 mg/dL Final    Creatinine 03/18/2022 0 84  0 60 - 1 30 mg/dL Final    Standardized to IDMS reference method    Glucose, Fasting 03/18/2022 82  65 - 99 mg/dL Final    Specimen collection should occur prior to Sulfasalazine administration due to the potential for falsely depressed results  Specimen collection should occur prior to Sulfapyridine administration due to the potential for falsely elevated results   Calcium 03/18/2022 8 6  8 3 - 10 1 mg/dL Final    AST 03/18/2022 16  5 - 45 U/L Final    Specimen collection should occur prior to Sulfasalazine administration due to the potential for falsely depressed results   ALT 03/18/2022 23  12 - 78 U/L Final    Specimen collection should occur prior to Sulfasalazine and/or Sulfapyridine administration due to the potential for falsely depressed results   Alkaline Phosphatase 03/18/2022 58  46 - 116 U/L Final    Total Protein 03/18/2022 6 5  6 4 - 8 2 g/dL Final    Albumin 03/18/2022 4 0  3 5 - 5 0 g/dL Final    Total Bilirubin 03/18/2022 1 61* 0 20 - 1 00 mg/dL Final    Use of this assay is not recommended for patients undergoing treatment with eltrombopag due to the potential for falsely elevated results      eGFR 03/18/2022 94  ml/min/1 73sq m Final    LD 03/18/2022 214  81 - 234 U/L Final    T3, Free 03/18/2022 2 19* 2 30 - 4 20 pg/mL Final    TSH 3RD GENERATON 03/18/2022 19 500* 0 358 - 3 740 uIU/mL Final    The recommended reference ranges for TSH during pregnancy are as follows:   First trimester 0 1 to 2 5 uIU/mL   Second trimester  0 2 to 3 0 uIU/mL   Third trimester 0 3 to 3 0 uIU/m    Note: Normal ranges may not apply to patients who are transgender, non-binary, or whose legal sex, sex at birth, and gender identity differ   Free T4 03/18/2022 1 03  0 76 - 1 46 ng/dL Final    Specimen collection should occur prior to Sulfasalazine administration due to the potential for falsely elevated results      HCG, Quant 03/18/2022 <2  <=6 mIU/mL Final   Ancillary Orders on 02/25/2022   Component Date Value Ref Range Status    WBC 03/04/2022 6 52  4 31 - 10 16 Thousand/uL Final    RBC 03/04/2022 4 43  3 81 - 5 12 Million/uL Final    Hemoglobin 03/04/2022 13 5  11 5 - 15 4 g/dL Final    Hematocrit 03/04/2022 39 7  34 8 - 46 1 % Final    MCV 03/04/2022 90  82 - 98 fL Final    MCH 03/04/2022 30 5  26 8 - 34 3 pg Final    MCHC 03/04/2022 34 0  31 4 - 37 4 g/dL Final    RDW 03/04/2022 12 4  11 6 - 15 1 % Final    MPV 03/04/2022 9 3  8 9 - 12 7 fL Final    Platelets 84/81/0000 267  149 - 390 Thousands/uL Final    nRBC 03/04/2022 0  /100 WBCs Final    Neutrophils Relative 03/04/2022 55  43 - 75 % Final    Immat GRANS % 03/04/2022 0  0 - 2 % Final    Lymphocytes Relative 03/04/2022 32  14 - 44 % Final    Monocytes Relative 03/04/2022 9  4 - 12 % Final    Eosinophils Relative 03/04/2022 3  0 - 6 % Final    Basophils Relative 03/04/2022 1  0 - 1 % Final    Neutrophils Absolute 03/04/2022 3 55  1 85 - 7 62 Thousands/µL Final    Immature Grans Absolute 03/04/2022 0 01  0 00 - 0 20 Thousand/uL Final    Lymphocytes Absolute 03/04/2022 2 10  0 60 - 4 47 Thousands/µL Final    Monocytes Absolute 03/04/2022 0 59  0 17 - 1 22 Thousand/µL Final    Eosinophils Absolute 03/04/2022 0 20  0 00 - 0 61 Thousand/µL Final    Basophils Absolute 03/04/2022 0 07  0 00 - 0 10 Thousands/µL Final    Sodium 03/04/2022 140 136 - 145 mmol/L Final    Potassium 03/04/2022 4 6  3 5 - 5 3 mmol/L Final    Chloride 03/04/2022 109* 100 - 108 mmol/L Final    CO2 03/04/2022 27  21 - 32 mmol/L Final    ANION GAP 03/04/2022 4  4 - 13 mmol/L Final    BUN 03/04/2022 13  5 - 25 mg/dL Final    Creatinine 03/04/2022 0 86  0 60 - 1 30 mg/dL Final    Standardized to IDMS reference method    Glucose, Fasting 03/04/2022 77  65 - 99 mg/dL Final    Specimen collection should occur prior to Sulfasalazine administration due to the potential for falsely depressed results  Specimen collection should occur prior to Sulfapyridine administration due to the potential for falsely elevated results   Calcium 03/04/2022 9 3  8 3 - 10 1 mg/dL Final    AST 03/04/2022 14  5 - 45 U/L Final    Specimen collection should occur prior to Sulfasalazine administration due to the potential for falsely depressed results   ALT 03/04/2022 19  12 - 78 U/L Final    Specimen collection should occur prior to Sulfasalazine and/or Sulfapyridine administration due to the potential for falsely depressed results   Alkaline Phosphatase 03/04/2022 82  46 - 116 U/L Final    Total Protein 03/04/2022 7 1  6 4 - 8 2 g/dL Final    Albumin 03/04/2022 3 9  3 5 - 5 0 g/dL Final    Total Bilirubin 03/04/2022 1 52* 0 20 - 1 00 mg/dL Final    Use of this assay is not recommended for patients undergoing treatment with eltrombopag due to the potential for falsely elevated results      eGFR 03/04/2022 91  ml/min/1 73sq m Final    LD 03/04/2022 212  81 - 234 U/L Final    T3, Free 03/04/2022 2 45  2 30 - 4 20 pg/mL Final    TSH 3RD GENERATON 03/04/2022 5 130* 0 358 - 3 740 uIU/mL Final    The recommended reference ranges for TSH during pregnancy are as follows:   First trimester 0 1 to 2 5 uIU/mL   Second trimester  0 2 to 3 0 uIU/mL   Third trimester 0 3 to 3 0 uIU/m    Note: Normal ranges may not apply to patients who are transgender, non-binary, or whose legal sex, sex at birth, and gender identity differ   Free T4 2022 1 25  0 76 - 1 46 ng/dL Final    Specimen collection should occur prior to Sulfasalazine administration due to the potential for falsely elevated results   HCG, Quant 2022 <2  <=6 mIU/mL Final         The following historical data was reviewed  Past Medical History:   Diagnosis Date    Lymphoma [facility])        Past Surgical History:   Procedure Laterality Date    LYMPH NODE BIOPSY Right 2021    Procedure: BIOPSY LYMPH NODE SENTINEL, INTRAOPERATIVE   LYMPHATIC MAPPING, LYMPHOSCINTIGRAPHY ([facility] MED INJECT AT 0730); Surgeon: Lisa Sharma MD;  Location: [facility];  Service: Surgical Oncology    SKIN LESION EXCISION Right 2021    Procedure: EXCISION WIDE LESION UPPER EXTREMITY;  Surgeon: Lisa Sharma MD;  [facility];  Service: Surgical Oncology    SPLIT THICKNESS SKIN GRAFT Right 2021    Procedure: RIGHT SHOULDER  COMPLEX CLOSURE;  Surgeon: Nate Cobb MD;  [facility]: [facility];  Service: Plastics    WISDOM TOOTH EXTRACTION         Social History     Socioeconomic History    Marital status: Single     Spouse name: None    Number of children: None    Years of education: None    Highest education level: None   Occupational History    None   Tobacco Use    Smoking status: Former Smoker     Packs/day: 0 25     Years: 1 00     Pack years: 0 25     Types: Cigarettes     Quit date:      Years since quittin 2    Smokeless tobacco: Never Used    Tobacco comment: about 2-3 years ago    Vaping Use    Vaping Use: Never used   Substance and Sexual Activity    Alcohol use:  Yes     Alcohol/week: 1 0 standard drink     Types: 1 Cans of beer per week     Comment: rare    Drug use: No    Sexual activity: Yes     Partners: Male   Other Topics Concern    None   Social History Narrative    Denied history of daily coffee consumption      Social Determinants of Health     Financial Resource Strain: Not on file Food Insecurity: Not on file   Transportation Needs: Not on file   Physical Activity: Not on file   Stress: Not on file   Social Connections: Not on file   Intimate Partner Violence: Not on file   Housing Stability: Not on file       Family History   Problem Relation Age of Onset    No Known Problems Mother     Breast cancer Maternal Grandmother     Breast cancer Maternal Aunt        Please note: This report has been generated by a voice recognition software system  Therefore there may be syntax, spelling, and/or grammatical errors  Please call if you have any questions

## 2022-03-23 ENCOUNTER — PATIENT MESSAGE (OUTPATIENT)
Dept: HEMATOLOGY ONCOLOGY | Facility: CLINIC | Age: 32
End: 2022-03-23

## 2022-03-23 DIAGNOSIS — R11.0 NAUSEA: ICD-10-CM

## 2022-03-23 DIAGNOSIS — Z79.899 HIGH RISK MEDICATION USE: Primary | ICD-10-CM

## 2022-03-23 DIAGNOSIS — C43.61 MALIGNANT MELANOMA OF SHOULDER, RIGHT (HCC): ICD-10-CM

## 2022-03-23 RX ORDER — ONDANSETRON 4 MG/1
4 TABLET, FILM COATED ORAL EVERY 8 HOURS PRN
Qty: 20 TABLET | Refills: 0 | Status: SHIPPED | OUTPATIENT
Start: 2022-03-23 | End: 2022-08-03

## 2022-03-23 RX ORDER — PROCHLORPERAZINE MALEATE 10 MG
10 TABLET ORAL EVERY 6 HOURS PRN
Qty: 30 TABLET | Refills: 0 | Status: SHIPPED | OUTPATIENT
Start: 2022-03-23 | End: 2022-08-03

## 2022-03-23 NOTE — TELEPHONE ENCOUNTER
Called and spoke with patient  She experienced diarrhea x3 yesterday and nausea  She has not had any BMs yet today but is still nauseous  Denies fever, chills, vomiting  Pregnancy test on 3/18 negative  Discussed with Dr Alicia Wade  Patient to take imodium following the instructions on the box  Patient should call back in 2-3 days if diarrhea persists  Compazine and Zofran sent to patient's pharmacy for nausea  Patient made aware and verbalized understanding

## 2022-04-01 ENCOUNTER — HOSPITAL ENCOUNTER (OUTPATIENT)
Dept: CT IMAGING | Facility: HOSPITAL | Age: 32
Discharge: HOME | End: 2022-04-01
Payer: COMMERCIAL

## 2022-04-01 DIAGNOSIS — C43.61 MALIGNANT MELANOMA OF SHOULDER, RIGHT (HCC): ICD-10-CM

## 2022-04-01 PROCEDURE — G1004 CDSM NDSC: HCPCS

## 2022-04-01 PROCEDURE — 70491 CT SOFT TISSUE NECK W/DYE: CPT

## 2022-04-01 PROCEDURE — 74177 CT ABD & PELVIS W/CONTRAST: CPT

## 2022-04-01 PROCEDURE — 71260 CT THORAX DX C+: CPT

## 2022-04-01 RX ADMIN — IOHEXOL 100 ML: 350 INJECTION, SOLUTION INTRAVENOUS at 10:10

## 2022-04-06 DIAGNOSIS — F41.9 ANXIETY: ICD-10-CM

## 2022-04-06 RX ORDER — ESCITALOPRAM OXALATE 10 MG/1
TABLET ORAL
Qty: 30 TABLET | Refills: 3 | Status: SHIPPED | OUTPATIENT
Start: 2022-04-06 | End: 2022-06-16 | Stop reason: SDUPTHER

## 2022-04-18 DIAGNOSIS — E03.9 HYPOTHYROIDISM, UNSPECIFIED TYPE: ICD-10-CM

## 2022-04-18 RX ORDER — LEVOTHYROXINE SODIUM 0.03 MG/1
25 TABLET ORAL DAILY
Qty: 30 TABLET | Refills: 0 | Status: SHIPPED | OUTPATIENT
Start: 2022-04-18 | End: 2022-05-02

## 2022-04-27 ENCOUNTER — TELEPHONE (OUTPATIENT)
Dept: HEMATOLOGY ONCOLOGY | Facility: CLINIC | Age: 32
End: 2022-04-27

## 2022-04-28 ENCOUNTER — TELEPHONE (OUTPATIENT)
Dept: HEMATOLOGY ONCOLOGY | Facility: CLINIC | Age: 32
End: 2022-04-28

## 2022-04-28 NOTE — TELEPHONE ENCOUNTER
Spoke with pt per Heavenly Hector that she will see her in infusion at 2pm instead of in the office at 1pm

## 2022-04-29 ENCOUNTER — APPOINTMENT (OUTPATIENT)
Dept: LAB | Facility: MEDICAL CENTER | Age: 32
End: 2022-04-29
Payer: COMMERCIAL

## 2022-04-29 DIAGNOSIS — C43.61 MALIGNANT MELANOMA OF SHOULDER, RIGHT (HCC): ICD-10-CM

## 2022-04-29 DIAGNOSIS — Z79.899 HIGH RISK MEDICATION USE: ICD-10-CM

## 2022-04-29 LAB — B-HCG SERPL-ACNC: <2 MIU/ML

## 2022-04-29 PROCEDURE — 84702 CHORIONIC GONADOTROPIN TEST: CPT

## 2022-05-02 ENCOUNTER — HOSPITAL ENCOUNTER (OUTPATIENT)
Dept: INFUSION CENTER | Facility: CLINIC | Age: 32
Discharge: HOME | End: 2022-05-02
Payer: COMMERCIAL

## 2022-05-02 ENCOUNTER — TELEPHONE (OUTPATIENT)
Dept: SURGICAL ONCOLOGY | Facility: CLINIC | Age: 32
End: 2022-05-02

## 2022-05-02 ENCOUNTER — OFFICE VISIT (OUTPATIENT)
Dept: HEMATOLOGY ONCOLOGY | Facility: CLINIC | Age: 32
End: 2022-05-02
Payer: COMMERCIAL

## 2022-05-02 VITALS
RESPIRATION RATE: 18 BRPM | HEART RATE: 67 BPM | DIASTOLIC BLOOD PRESSURE: 73 MMHG | TEMPERATURE: 97 F | OXYGEN SATURATION: 98 % | SYSTOLIC BLOOD PRESSURE: 114 MMHG

## 2022-05-02 DIAGNOSIS — C43.61 MALIGNANT MELANOMA OF SHOULDER, RIGHT (HCC): Primary | ICD-10-CM

## 2022-05-02 DIAGNOSIS — Z29.8 IMMUNOTHERAPY: ICD-10-CM

## 2022-05-02 DIAGNOSIS — Z79.899 HIGH RISK MEDICATIONS (NOT ANTICOAGULANTS) LONG-TERM USE: ICD-10-CM

## 2022-05-02 DIAGNOSIS — R53.83 FATIGUE, UNSPECIFIED TYPE: ICD-10-CM

## 2022-05-02 DIAGNOSIS — E03.9 HYPOTHYROIDISM, UNSPECIFIED TYPE: ICD-10-CM

## 2022-05-02 PROCEDURE — 96413 CHEMO IV INFUSION 1 HR: CPT

## 2022-05-02 PROCEDURE — 99213 OFFICE O/P EST LOW 20 MIN: CPT

## 2022-05-02 RX ORDER — SODIUM CHLORIDE 9 MG/ML
20 INJECTION, SOLUTION INTRAVENOUS ONCE
Status: COMPLETED | OUTPATIENT
Start: 2022-05-02 | End: 2022-05-02

## 2022-05-02 RX ORDER — LEVOTHYROXINE SODIUM 0.1 MG/1
100 TABLET ORAL DAILY
Qty: 30 TABLET | Refills: 3 | Status: SHIPPED | OUTPATIENT
Start: 2022-05-02 | End: 2022-08-03

## 2022-05-02 RX ADMIN — SODIUM CHLORIDE 400 MG: 9 INJECTION, SOLUTION INTRAVENOUS at 14:27

## 2022-05-02 RX ADMIN — SODIUM CHLORIDE 20 ML/HR: 0.9 INJECTION, SOLUTION INTRAVENOUS at 14:00

## 2022-05-02 NOTE — PROGRESS NOTES
[facility] [id number]  Progress Note  Norm Rivera, 1990, [id number]  5/2/2022    Assessment/Plan:  1  Malignant melanoma of shoulder, right ([address] )  Ms Govind Mott is a 29-year-old female with stage II B melanoma on adjuvant treatment with pembrolizumab here for follow-up prior to cycle 5  She is doing well and tolerating treatment fine  Labs reviewed in she is okay for treatment today  We reviewed the results of her CT scans from 04/01/2022 and they demonstrated no evidence of recurrent or metastatic disease  She has standing labs in the system and does have these completed the week prior to her next infusion  2  High risk medications (not anticoagulants) long-term use  3  Immunotherapy  She is on treatment with immunotherapy and we will continue to monitor for side effects at lab abnormalities  We will monitor labs with each treatment to ensure safety of continuing on treatment  She knows to watch for signs and symptoms for immune mediated side effects  Denies any immune mediated side effects at this time  4  Hypothyroidism, unspecified type  5  Fatigue, unspecified type  TSH is now 0 353  She is taking 125 mcg of synthroid  She will now decrease to 100 mcg and call the office she becomes symptomatic before her next infusion  The patient is scheduled for follow-up in approximately 6 weeks with Dr Daniella Aguilar  Patient voiced agreement and understanding to the above  Patient knows to call the Hematology/Oncology office with any questions and concerns regarding the above     -------------------------------------------------------------------------------------------------------    No chief complaint on file  History of present illness:   Elizabeth Starkey is a 29-year-old female with stage IIB melanoma on adjuvant treatment with pembrolizumab here for follow-up prior to treatment with cycle 5   She is doing well feels good  She has no complaints or concerns today  She had a surveillance CT scan neck/chest/abdomen/pelvis on 2022 that revealed no evidence of recurrent or metastatic disease  Cancer History:   Oncology History   Malignant melanoma of shoulder, right ([address] )   2021 Biopsy    Right anterior shoulder:  - Ulcerated malignant melanoma, 2 5mm    Ulceration: Yes  Mitosis: >1    NGS Testing  MS-Stable  TMB - 13Muts/Mb  CDKN2A E10  TERT promoter -124C>T       2021 Genomic Testing    DecisionDx - Class 2B     2021 -  Cancer Staged    Staging form: Melanoma of the Skin, AJCC 8th Edition  - Pathologic stage from 2021: Stage IIB (pT3b, pN0, cM0) - Signed by Leigh Corea MD on 2021  Stage prefix: Initial diagnosis       2021 Surgery    Wide excision melanoma with sentinel lymph node biopsy  No residual melanoma  LNs negative - 0/9 LNs     2021 -  Chemotherapy    pembrolizumab (KEYTRUDA) IVPB, 400 mg, Intravenous, Once, 5 of 6 cycles  Administration: 400 mg (2021), 400 mg (2021), 400 mg (2022), 400 mg (3/21/2022)          Cancer Staging  Malignant melanoma of shoulder, right (HCC)  Staging form: Melanoma of the Skin, AJCC 8th Edition  - Pathologic stage from 2021: Stage IIB (pT3b, pN0, cM0) - Signed by Leigh Corea MD on 2021       ECO - Asymptomatic     Review of Systems   Constitutional: Negative for activity change, appetite change, fatigue, fever and unexpected weight change  HENT: Negative for trouble swallowing and voice change  Eyes: Negative for photophobia and visual disturbance  Respiratory: Negative for cough, chest tightness, shortness of breath and wheezing  Cardiovascular: Negative for chest pain, palpitations and leg swelling  Gastrointestinal: Negative for abdominal distention, abdominal pain, constipation, diarrhea, nausea and vomiting  Endocrine: Negative for cold intolerance and heat intolerance  Genitourinary: Negative for dysuria, hematuria and urgency  Musculoskeletal: Negative for arthralgias, back pain, gait problem, joint swelling and myalgias  Skin: Negative for color change, pallor and rash  No new, changing, or concerning lesions  Neurological: Negative for dizziness, tremors, syncope, weakness, light-headedness, numbness and headaches  Hematological: Negative for adenopathy  Does not bruise/bleed easily  Psychiatric/Behavioral: Negative for confusion and sleep disturbance  Current Outpatient Medications:     clindamycin (CLEOCIN T) 1 % lotion, Apply to face, Disp: , Rfl:     escitalopram (LEXAPRO) 10 mg tablet, TAKE 1 TABLET BY MOUTH EVERY DAY, Disp: 30 tablet, Rfl: 3    levothyroxine (Levoxyl) 100 mcg tablet, Take 1 tablet (100 mcg total) by mouth daily, Disp: 30 tablet, Rfl: 3    norethindrone (MICRONOR) 0 35 MG tablet, Take 0 35 mg by mouth daily, Disp: , Rfl:     ondansetron (ZOFRAN) 4 mg tablet, Take 1 tablet (4 mg total) by mouth every 8 (eight) hours as needed for nausea or vomiting, Disp: 20 tablet, Rfl: 0    pembrolizumab (KEYTRUDA) IVPB, 400 mg IV over 30 minutes every 6 weeks for a total of 9 infusions  , Disp: 400 mg, Rfl: 9    prochlorperazine (COMPAZINE) 10 mg tablet, Take 1 tablet (10 mg total) by mouth every 6 (six) hours as needed for nausea or vomiting, Disp: 30 tablet, Rfl: 0    tretinoin (RETIN-A) 0 025 % cream, APPLY A PEA-SIZED AMOUNT TO FACE AT NIGHT , Disp: , Rfl:   No current facility-administered medications for this visit  Facility-Administered Medications Ordered in Other Visits:     pembrolizumab (KEYTRUDA) 400 mg in sodium chloride 0 9 % 50 mL IVPB, 400 mg, Intravenous, Once, Eun Whitehead MD, Last Rate: 132 mL/hr at 05/02/22 1427, 400 mg at 05/02/22 1427    Allergies   Allergen Reactions    Strawberry Extract - Food Allergy Swelling       Objective: There were no vitals taken for this visit    Wt Readings from Last 6 Encounters:   03/21/22 70 1 kg (154 lb 8 oz)   03/21/22 70 5 kg (155 lb 8 oz)   02/07/22 71 7 kg (158 lb)   12/29/21 68 kg (150 lb)   12/29/21 68 3 kg (150 lb 8 oz)   11/17/21 68 9 kg (152 lb)       Physical Exam  Constitutional:       General: She is not in acute distress  Appearance: Normal appearance  She is normal weight  She is not ill-appearing  HENT:      Head: Atraumatic  Eyes:      Extraocular Movements: Extraocular movements intact  Conjunctiva/sclera: Conjunctivae normal    Cardiovascular:      Rate and Rhythm: Normal rate and regular rhythm  Pulses: Normal pulses  Heart sounds: Normal heart sounds  No murmur heard  Pulmonary:      Effort: Pulmonary effort is normal  No respiratory distress  Breath sounds: Normal breath sounds  No wheezing  Chest:   Breasts:      Right: No axillary adenopathy  Left: No axillary adenopathy  Abdominal:      General: Abdomen is flat  Bowel sounds are normal  There is no distension  Palpations: Abdomen is soft  Tenderness: There is no abdominal tenderness  Musculoskeletal:      Cervical back: Neck supple  No tenderness  Right lower leg: No edema  Left lower leg: No edema  Lymphadenopathy:      Head:      Right side of head: No preauricular or posterior auricular adenopathy  Left side of head: No preauricular or posterior auricular adenopathy  Cervical: No cervical adenopathy  Right cervical: No superficial cervical adenopathy  Left cervical: No superficial cervical adenopathy  Upper Body:      Right upper body: No axillary adenopathy  Left upper body: No axillary adenopathy  Skin:     General: Skin is warm and dry  Capillary Refill: Capillary refill takes less than 2 seconds  Coloration: Skin is not pale  Findings: No bruising, lesion or rash  Neurological:      General: No focal deficit present        Mental Status: She is alert and oriented to person, place, and time  Mental status is at baseline  Motor: No weakness  Gait: Gait normal    Psychiatric:         Mood and Affect: Mood normal          Behavior: Behavior normal          Thought Content: Thought content normal          Judgment: Judgment normal          Goals and Barriers:    Current Goal:   Prolong Survival from Cancer  Barriers: None  Patient's Capacity to Self Care:  Patient  able to self care  Pertinent Laboratory Results and Imaging Review:    4/1/22: CT soft tissue neck w contrast:   Stable CT of the neck  No enlarged or pathologic adenopathy identified  Stable level 2A nodes      4/1/22: CT chest abdomen pelvis w contrast:  No new mediastinal or abdominopelvic lymphadenopathy  No new metastatic disease  Routine surveillance can be continued    Appointment on 04/29/2022   Component Date Value Ref Range Status    HCG, Quant 04/29/2022 <2  <=6 mIU/mL Final   Ancillary Orders on 04/29/2022   Component Date Value Ref Range Status    WBC 04/29/2022 6 03  4 31 - 10 16 Thousand/uL Final    RBC 04/29/2022 4 63  3 81 - 5 12 Million/uL Final    Hemoglobin 04/29/2022 13 8  11 5 - 15 4 g/dL Final    Hematocrit 04/29/2022 42 1  34 8 - 46 1 % Final    MCV 04/29/2022 91  82 - 98 fL Final    MCH 04/29/2022 29 8  26 8 - 34 3 pg Final    MCHC 04/29/2022 32 8  31 4 - 37 4 g/dL Final    RDW 04/29/2022 11 5* 11 6 - 15 1 % Final    MPV 04/29/2022 9 3  8 9 - 12 7 fL Final    Platelets 82/99/8184 295  149 - 390 Thousands/uL Final    nRBC 04/29/2022 0  /100 WBCs Final    Neutrophils Relative 04/29/2022 58  43 - 75 % Final    Immat GRANS % 04/29/2022 0  0 - 2 % Final    Lymphocytes Relative 04/29/2022 28  14 - 44 % Final    Monocytes Relative 04/29/2022 9  4 - 12 % Final    Eosinophils Relative 04/29/2022 4  0 - 6 % Final    Basophils Relative 04/29/2022 1  0 - 1 % Final    Neutrophils Absolute 04/29/2022 3 47  1 85 - 7 62 Thousands/µL Final    Immature Grans Absolute 04/29/2022 0 01  0 00 - 0  20 Thousand/uL Final    Lymphocytes Absolute 04/29/2022 1 70  0 60 - 4 47 Thousands/µL Final    Monocytes Absolute 04/29/2022 0 54  0 17 - 1 22 Thousand/µL Final    Eosinophils Absolute 04/29/2022 0 24  0 00 - 0 61 Thousand/µL Final    Basophils Absolute 04/29/2022 0 07  0 00 - 0 10 Thousands/µL Final    Sodium 04/29/2022 139  136 - 145 mmol/L Final    Potassium 04/29/2022 4 4  3 5 - 5 3 mmol/L Final    Chloride 04/29/2022 109* 100 - 108 mmol/L Final    CO2 04/29/2022 28  21 - 32 mmol/L Final    ANION GAP 04/29/2022 2* 4 - 13 mmol/L Final    BUN 04/29/2022 14  5 - 25 mg/dL Final    Creatinine 04/29/2022 0 70  0 60 - 1 30 mg/dL Final    Standardized to IDMS reference method    Glucose, Fasting 04/29/2022 84  65 - 99 mg/dL Final    Specimen collection should occur prior to Sulfasalazine administration due to the potential for falsely depressed results  Specimen collection should occur prior to Sulfapyridine administration due to the potential for falsely elevated results   Calcium 04/29/2022 9 1  8 3 - 10 1 mg/dL Final    AST 04/29/2022 14  5 - 45 U/L Final    Specimen collection should occur prior to Sulfasalazine administration due to the potential for falsely depressed results   ALT 04/29/2022 18  12 - 78 U/L Final    Specimen collection should occur prior to Sulfasalazine and/or Sulfapyridine administration due to the potential for falsely depressed results   Alkaline Phosphatase 04/29/2022 70  46 - 116 U/L Final    Total Protein 04/29/2022 6 7  6 4 - 8 2 g/dL Final    Albumin 04/29/2022 3 6  3 5 - 5 0 g/dL Final    Total Bilirubin 04/29/2022 1 32* 0 20 - 1 00 mg/dL Final    Use of this assay is not recommended for patients undergoing treatment with eltrombopag due to the potential for falsely elevated results      eGFR 04/29/2022 117  ml/min/1 73sq m Final    LD 04/29/2022 182  81 - 234 U/L Final    T3, Free 04/29/2022 2 83  2 30 - 4 20 pg/mL Final    TSH 3RD GENERATON 04/29/2022 0 353* 0 450 - 4 500 uIU/mL Final    The recommended reference ranges for TSH during pregnancy are as follows:   First trimester 0 1 to 2 5 uIU/mL   Second trimester  0 2 to 3 0 uIU/mL   Third trimester 0 3 to 3 0 uIU/m    Note: Normal ranges may not apply to patients who are transgender, non-binary, or whose legal sex, sex at birth, and gender identity differ  Adult TSH (3rd generation) reference range follows the recommended guidelines of the American Thyroid Association,    Free T4 2022 1 27  0 76 - 1 46 ng/dL Final    Specimen collection should occur prior to Sulfasalazine administration due to the potential for falsely elevated results  The following historical data was reviewed  Past Medical History:   Diagnosis Date    Lymphoma [facility])        Past Surgical History:   Procedure Laterality Date    LYMPH NODE BIOPSY Right 2021    Procedure: BIOPSY LYMPH NODE SENTINEL, INTRAOPERATIVE   LYMPHATIC MAPPING, LYMPHOSCINTIGRAPHY ([facility] MED INJECT AT 0730);   Surgeon: Matt Wright MD;  Location: [facility];  Service: Surgical Oncology    SKIN LESION EXCISION Right 2021    Procedure: EXCISION WIDE LESION UPPER EXTREMITY;  Surgeon: Matt Wright MD;  [facility];  Service: Surgical Oncology    SPLIT THICKNESS SKIN GRAFT Right 2021    Procedure: RIGHT SHOULDER  COMPLEX CLOSURE;  Surgeon: Brittany Santana MD;  Location: [facility];  Service: Plastics    WISDOM TOOTH EXTRACTION         Social History     Socioeconomic History    Marital status: Single     Spouse name: Not on file    Number of children: Not on file    Years of education: Not on file    Highest education level: Not on file   Occupational History    Not on file   Tobacco Use    Smoking status: Former Smoker     Packs/day: 0 25     Years: 1 00     Pack years: 0 25     Types: Cigarettes     Quit date:      Years since quittin 3    Smokeless tobacco: Never Used    Tobacco comment: about 2-3 years ago    Vaping Use    Vaping Use: Never used   Substance and Sexual Activity    Alcohol use: Yes     Alcohol/week: 1 0 standard drink     Types: 1 Cans of beer per week     Comment: rare    Drug use: No    Sexual activity: Yes     Partners: Male   Other Topics Concern    Not on file   Social History Narrative    Denied history of daily coffee consumption      Social Determinants of Health     Financial Resource Strain: Not on file   Food Insecurity: Not on file   Transportation Needs: Not on file   Physical Activity: Not on file   Stress: Not on file   Social Connections: Not on file   Intimate Partner Violence: Not on file   Housing Stability: Not on file       Family History   Problem Relation Age of Onset    No Known Problems Mother     Breast cancer Maternal Grandmother     Breast cancer Maternal Aunt        Please note: This report has been generated by a voice recognition software system  Therefore there may be syntax, spelling, and/or grammatical errors  Please call if you have any questions

## 2022-05-02 NOTE — TELEPHONE ENCOUNTER
Called patient and patient stated she is on her way to the infusion appt today 5/2/22 and confirmed will see Mabel Morgan at the infusion appt

## 2022-05-24 ENCOUNTER — APPOINTMENT (OUTPATIENT)
Dept: LAB | Facility: MEDICAL CENTER | Age: 32
End: 2022-05-24
Payer: COMMERCIAL

## 2022-05-24 DIAGNOSIS — E03.9 HYPOTHYROIDISM, UNSPECIFIED TYPE: ICD-10-CM

## 2022-05-24 DIAGNOSIS — Z79.899 HIGH RISK MEDICATION USE: ICD-10-CM

## 2022-05-24 DIAGNOSIS — C43.61 MALIGNANT MELANOMA OF SHOULDER, RIGHT (HCC): ICD-10-CM

## 2022-05-25 DIAGNOSIS — E03.9 HYPOTHYROIDISM, UNSPECIFIED TYPE: Primary | ICD-10-CM

## 2022-05-25 DIAGNOSIS — Z79.899 HIGH RISK MEDICATIONS (NOT ANTICOAGULANTS) LONG-TERM USE: ICD-10-CM

## 2022-05-25 RX ORDER — LEVOTHYROXINE SODIUM 0.12 MG/1
125 TABLET ORAL DAILY
Qty: 15 TABLET | Refills: 1 | Status: SHIPPED | OUTPATIENT
Start: 2022-05-25 | End: 2022-06-16 | Stop reason: SDUPTHER

## 2022-05-25 RX ORDER — LEVOTHYROXINE SODIUM 0.1 MG/1
100 TABLET ORAL DAILY
Qty: 15 TABLET | Refills: 1 | Status: SHIPPED | OUTPATIENT
Start: 2022-05-25 | End: 2022-06-16 | Stop reason: SDUPTHER

## 2022-06-02 ENCOUNTER — TELEPHONE (OUTPATIENT)
Dept: OTHER | Facility: OTHER | Age: 32
End: 2022-06-02

## 2022-06-03 ENCOUNTER — TELEPHONE (OUTPATIENT)
Dept: HEMATOLOGY ONCOLOGY | Facility: CLINIC | Age: 32
End: 2022-06-03

## 2022-06-03 NOTE — TELEPHONE ENCOUNTER
Appointment Cancellation Or Reschedule     Person calling in Patient    Provider Hardy Bryan   Office Visit Date and Time 6/13/22 @ 1pm   Office Visit Location [facility]   Did patient want to reschedule their office appointment? If so, when was it scheduled to? Yes 6/10/22 @ 1:30pm   Is this patient calling to reschedule an infusion appointment? no   When is their next infusion appointment? na   Is this patient a Chemo patient? no   Reason for Cancellation or Reschedule Unable to keep appt- patient also needs to reschedule infusion appt     If the patient is a treatment patient, please route this to the office nurse  If the patient is not on treatment, please route to the office MA

## 2022-06-09 RX ORDER — SODIUM CHLORIDE 9 MG/ML
20 INJECTION, SOLUTION INTRAVENOUS ONCE
Status: CANCELLED | OUTPATIENT
Start: 2022-06-16

## 2022-06-14 ENCOUNTER — APPOINTMENT (OUTPATIENT)
Dept: LAB | Facility: MEDICAL CENTER | Age: 32
End: 2022-06-14
Payer: COMMERCIAL

## 2022-06-14 DIAGNOSIS — Z79.899 HIGH RISK MEDICATION USE: ICD-10-CM

## 2022-06-14 DIAGNOSIS — C43.61 MALIGNANT MELANOMA OF SHOULDER, RIGHT (HCC): Primary | ICD-10-CM

## 2022-06-14 DIAGNOSIS — C43.61 MALIGNANT MELANOMA OF SHOULDER, RIGHT (HCC): ICD-10-CM

## 2022-06-14 LAB — B-HCG SERPL-ACNC: <2 MIU/ML

## 2022-06-14 PROCEDURE — 84702 CHORIONIC GONADOTROPIN TEST: CPT

## 2022-06-16 ENCOUNTER — HOSPITAL ENCOUNTER (OUTPATIENT)
Dept: INFUSION CENTER | Facility: CLINIC | Age: 32
Discharge: HOME | End: 2022-06-16
Payer: COMMERCIAL

## 2022-06-16 ENCOUNTER — OFFICE VISIT (OUTPATIENT)
Dept: HEMATOLOGY ONCOLOGY | Facility: CLINIC | Age: 32
End: 2022-06-16
Payer: COMMERCIAL

## 2022-06-16 VITALS
RESPIRATION RATE: 18 BRPM | HEART RATE: 56 BPM | SYSTOLIC BLOOD PRESSURE: 111 MMHG | DIASTOLIC BLOOD PRESSURE: 74 MMHG | TEMPERATURE: 97.4 F

## 2022-06-16 VITALS
WEIGHT: 149 LBS | SYSTOLIC BLOOD PRESSURE: 124 MMHG | RESPIRATION RATE: 18 BRPM | OXYGEN SATURATION: 99 % | DIASTOLIC BLOOD PRESSURE: 72 MMHG | HEIGHT: 67 IN | BODY MASS INDEX: 23.39 KG/M2 | TEMPERATURE: 97.6 F | HEART RATE: 59 BPM

## 2022-06-16 DIAGNOSIS — C43.61 MALIGNANT MELANOMA OF SHOULDER, RIGHT (HCC): Primary | ICD-10-CM

## 2022-06-16 DIAGNOSIS — Z29.8 IMMUNOTHERAPY: ICD-10-CM

## 2022-06-16 DIAGNOSIS — Z79.899 HIGH RISK MEDICATION USE: ICD-10-CM

## 2022-06-16 DIAGNOSIS — E03.9 HYPOTHYROIDISM, UNSPECIFIED TYPE: ICD-10-CM

## 2022-06-16 DIAGNOSIS — Z79.899 HIGH RISK MEDICATIONS (NOT ANTICOAGULANTS) LONG-TERM USE: ICD-10-CM

## 2022-06-16 DIAGNOSIS — F41.9 ANXIETY: ICD-10-CM

## 2022-06-16 PROCEDURE — 1036F TOBACCO NON-USER: CPT

## 2022-06-16 PROCEDURE — 96413 CHEMO IV INFUSION 1 HR: CPT

## 2022-06-16 PROCEDURE — 3008F BODY MASS INDEX DOCD: CPT

## 2022-06-16 PROCEDURE — 99213 OFFICE O/P EST LOW 20 MIN: CPT

## 2022-06-16 RX ORDER — SODIUM CHLORIDE 9 MG/ML
20 INJECTION, SOLUTION INTRAVENOUS ONCE
Status: COMPLETED | OUTPATIENT
Start: 2022-06-16 | End: 2022-06-16

## 2022-06-16 RX ORDER — LEVOTHYROXINE SODIUM 0.12 MG/1
125 TABLET ORAL DAILY
Qty: 15 TABLET | Refills: 3 | Status: SHIPPED | OUTPATIENT
Start: 2022-06-16 | End: 2022-08-03

## 2022-06-16 RX ORDER — LEVOTHYROXINE SODIUM 0.1 MG/1
100 TABLET ORAL DAILY
Qty: 15 TABLET | Refills: 3 | Status: SHIPPED | OUTPATIENT
Start: 2022-06-16 | End: 2022-08-03

## 2022-06-16 RX ORDER — ESCITALOPRAM OXALATE 10 MG/1
10 TABLET ORAL DAILY
Qty: 30 TABLET | Refills: 3 | Status: SHIPPED | OUTPATIENT
Start: 2022-06-16 | End: 2022-08-03

## 2022-06-16 RX ADMIN — SODIUM CHLORIDE 400 MG: 9 INJECTION, SOLUTION INTRAVENOUS at 14:42

## 2022-06-16 RX ADMIN — SODIUM CHLORIDE 20 ML/HR: 0.9 INJECTION, SOLUTION INTRAVENOUS at 14:27

## 2022-06-16 NOTE — PROGRESS NOTES
Patient completed treatment today without incident  PIV removed intact for DC by ELLYN RN, coban wrap in place  Patient aware of next appts, declines AVS  Patient AAOx4 and ambulatory upon DC without gait disturbance noted

## 2022-06-16 NOTE — PROGRESS NOTES
[facility] [id number]  Progress Note  Mikie Rivera, 1990, [id number]  6/16/2022    Assessment/Plan:  1  Malignant melanoma of shoulder, right ([facility] [address] )  Ms Lisa Gordon is a 68-year-old female with stage IIIB melanoma on adjuvant treatment with pembrolizumab here for follow-up prior to cycle 6  She is doing well and tolerating treatment fine  Labs reviewed and she is okay for treatment today  She is not due for repeat scans until October  We discussed that she will complete 1 year of treatment after cycle 9  She has standing labs in the system and knows to have these completed the week prior to her next infusion  2  High risk medications (not anticoagulants) long-term use  3  Immunotherapy  She is on treatment with immunotherapy and we will continue to monitor for side effects at lab abnormalities  We will monitor labs with each treatment to ensure safety of continuing on treatment  She knows to watch for signs and symptoms for immune mediated side effects  Denies any immune mediated side effects at this time  4  Hypothyroidism, unspecified type  Most recent TSH was normal at 0 585  She is taking Synthroid 100 mcg every other day alternating with the 1-125 mcg tablets on the opposite days  Refill provided today  - levothyroxine (Synthroid) 100 mcg tablet; Take 1 tablet (100 mcg total) by mouth daily Alternate between 125 mcg and 100 mcg every other day  Dispense: 15 tablet; Refill: 3  - levothyroxine (Synthroid) 125 mcg tablet; Take 1 tablet (125 mcg total) by mouth daily Alternate between 125 mcg and 100 mcg every other day  Dispense: 15 tablet; Refill: 3    5  Anxiety  Well controlled on Lexapro 10 mg  Refill provided today  - escitalopram (LEXAPRO) 10 mg tablet; Take 1 tablet (10 mg total) by mouth daily  Dispense: 30 tablet;  Refill: 3      The patient is scheduled for follow-up in approximately 6 weeks with   [facility]  Patient voiced agreement and understanding to the above  Patient knows to call the Hematology/Oncology office with any questions and concerns regarding the above     -------------------------------------------------------------------------------------------------------    Chief Complaint   Patient presents with    Follow-up       History of present illness:   Pardeep Sanchez is a 33-year-old female with stage II B melanoma on adjuvant treatment with pembrolizumab here for follow-up prior to treatment with cycle 6  She is doing well feels good  She has no complaints or concerns today  She denies any new, changing, concerning lesions        Cancer History:   Oncology History   Malignant melanoma of shoulder, right ([facility] [address] )   2021 Biopsy    Right anterior shoulder:  - Ulcerated malignant melanoma, 2 5mm    Ulceration: Yes  Mitosis: >1    NGS Testing  MS-Stable  TMB - 13Muts/Mb  CDKN2A E10  TERT promoter -124C>T       2021 Genomic Testing    DecisionDx - Class 2B     2021 -  Cancer Staged    Staging form: Melanoma of the Skin, AJCC 8th Edition  - Pathologic stage from 2021: Stage IIB (pT3b, pN0, cM0) - Signed by Didi Cantu MD on 2021  Stage prefix: Initial diagnosis       2021 Surgery    Wide excision melanoma with sentinel lymph node biopsy  No residual melanoma  LNs negative - 0/9 LNs     2021 -  Chemotherapy    pembrolizumab (KEYTRUDA) IVPB, 400 mg, Intravenous, Once, 5 of 9 cycles  Administration: 400 mg (2021), 400 mg (2021), 400 mg (2022), 400 mg (3/21/2022), 400 mg (2022)          Cancer Staging  Malignant melanoma of shoulder, right (HCC)  Staging form: Melanoma of the Skin, AJCC 8th Edition  - Pathologic stage from 2021: Stage IIB (pT3b, pN0, cM0) - Signed by Didi Cantu MD on 2021       ECO - Asymptomatic     Review of Systems   Constitutional: Negative for activity change, appetite change, fatigue, fever and unexpected weight change  HENT: Negative for trouble swallowing and voice change  Eyes: Negative for photophobia and visual disturbance  Respiratory: Negative for cough, chest tightness, shortness of breath and wheezing  Cardiovascular: Negative for chest pain, palpitations and leg swelling  Gastrointestinal: Negative for abdominal distention, abdominal pain, constipation, diarrhea, nausea and vomiting  Endocrine: Negative for cold intolerance and heat intolerance  Genitourinary: Negative for dysuria, hematuria and urgency  Musculoskeletal: Negative for arthralgias, back pain, gait problem, joint swelling and myalgias  Skin: Negative for color change, pallor and rash  No new, changing, or concerning lesions  Neurological: Negative for dizziness, tremors, syncope, weakness, light-headedness, numbness and headaches  Hematological: Negative for adenopathy  Does not bruise/bleed easily  Psychiatric/Behavioral: Negative for confusion and sleep disturbance           Current Outpatient Medications:     clindamycin (CLEOCIN T) 1 % lotion, Apply to face, Disp: , Rfl:     escitalopram (LEXAPRO) 10 mg tablet, Take 1 tablet (10 mg total) by mouth daily, Disp: 30 tablet, Rfl: 3    levothyroxine (Levoxyl) 100 mcg tablet, Take 1 tablet (100 mcg total) by mouth daily, Disp: 30 tablet, Rfl: 3    levothyroxine (Synthroid) 100 mcg tablet, Take 1 tablet (100 mcg total) by mouth daily Alternate between 125 mcg and 100 mcg every other day , Disp: 15 tablet, Rfl: 3    levothyroxine (Synthroid) 125 mcg tablet, Take 1 tablet (125 mcg total) by mouth daily Alternate between 125 mcg and 100 mcg every other day , Disp: 15 tablet, Rfl: 3    norethindrone (MICRONOR) 0 35 MG tablet, Take 0 35 mg by mouth daily, Disp: , Rfl:     ondansetron (ZOFRAN) 4 mg tablet, Take 1 tablet (4 mg total) by mouth every 8 (eight) hours as needed for nausea or vomiting, Disp: 20 tablet, Rfl: 0    pembrolizumab (Good Grew) IVPB, 400 mg IV over 30 minutes every 6 weeks for a total of 9 infusions  , Disp: 400 mg, Rfl: 9    prochlorperazine (COMPAZINE) 10 mg tablet, Take 1 tablet (10 mg total) by mouth every 6 (six) hours as needed for nausea or vomiting, Disp: 30 tablet, Rfl: 0    tretinoin (RETIN-A) 0 025 % cream, APPLY A PEA-SIZED AMOUNT TO FACE AT NIGHT , Disp: , Rfl:     Allergies   Allergen Reactions    Strawberry Extract - Food Allergy Swelling       Objective:   /72 (BP Location: Left arm, Patient Position: Sitting, Cuff Size: Adult)   Pulse 59   Temp 97 6 °F (36 4 °C)   Resp 18   Ht 5' 7" (1 702 m)   Wt 67 6 kg (149 lb)   SpO2 99%   BMI 23 34 kg/m²   Wt Readings from Last 6 Encounters:   06/16/22 67 6 kg (149 lb)   03/21/22 70 1 kg (154 lb 8 oz)   03/21/22 70 5 kg (155 lb 8 oz)   02/07/22 71 7 kg (158 lb)   12/29/21 68 kg (150 lb)   12/29/21 68 3 kg (150 lb 8 oz)       Physical Exam  Constitutional:       General: She is not in acute distress  Appearance: Normal appearance  She is normal weight  She is not ill-appearing  HENT:      Head: Atraumatic  Eyes:      Extraocular Movements: Extraocular movements intact  Conjunctiva/sclera: Conjunctivae normal    Cardiovascular:      Rate and Rhythm: Normal rate and regular rhythm  Pulses: Normal pulses  Heart sounds: Normal heart sounds  No murmur heard  Pulmonary:      Effort: Pulmonary effort is normal  No respiratory distress  Breath sounds: Normal breath sounds  No wheezing  Chest:   Breasts:      Right: No axillary adenopathy  Left: No axillary adenopathy  Abdominal:      General: Abdomen is flat  Bowel sounds are normal  There is no distension  Palpations: Abdomen is soft  Tenderness: There is no abdominal tenderness  Musculoskeletal:      Cervical back: Neck supple  No tenderness  Right lower leg: No edema  Left lower leg: No edema     Lymphadenopathy:      Head:      Right side of head: No preauricular or posterior auricular adenopathy  Left side of head: No preauricular or posterior auricular adenopathy  Cervical: No cervical adenopathy  Right cervical: No superficial cervical adenopathy  Left cervical: No superficial cervical adenopathy  Upper Body:      Right upper body: No axillary adenopathy  Left upper body: No axillary adenopathy  Skin:     General: Skin is warm and dry  Capillary Refill: Capillary refill takes less than 2 seconds  Coloration: Skin is not pale  Findings: No bruising, lesion or rash  Comments: Well healed surgical scar with no evidence of recurrence at the primary site  Neurological:      General: No focal deficit present  Mental Status: She is alert and oriented to person, place, and time  Mental status is at baseline  Motor: No weakness  Gait: Gait normal    Psychiatric:         Mood and Affect: Mood normal          Behavior: Behavior normal          Thought Content: Thought content normal          Judgment: Judgment normal          Goals and Barriers:    Current Goal:  Prolong Survival from Cancer  Barriers: None  Patient's Capacity to Self Care:  Patient able to self care      Pertinent Laboratory Results and Imaging Review:  Appointment on 06/14/2022   Component Date Value Ref Range Status    HCG, Quant 06/14/2022 <2  <=6 mIU/mL Final   Ancillary Orders on 06/10/2022   Component Date Value Ref Range Status    WBC 06/14/2022 7 57  4 31 - 10 16 Thousand/uL Final    RBC 06/14/2022 4 45  3 81 - 5 12 Million/uL Final    Hemoglobin 06/14/2022 13 6  11 5 - 15 4 g/dL Final    Hematocrit 06/14/2022 40 0  34 8 - 46 1 % Final    MCV 06/14/2022 90  82 - 98 fL Final    MCH 06/14/2022 30 6  26 8 - 34 3 pg Final    MCHC 06/14/2022 34 0  31 4 - 37 4 g/dL Final    RDW 06/14/2022 11 5 (A) 11 6 - 15 1 % Final    MPV 06/14/2022 9 5  8 9 - 12 7 fL Final    Platelets 57/38/4265 275  149 - 390 Thousands/uL Final    nRBC 06/14/2022 0  /100 WBCs Final    Neutrophils Relative 06/14/2022 56  43 - 75 % Final    Immat GRANS % 06/14/2022 0  0 - 2 % Final    Lymphocytes Relative 06/14/2022 33  14 - 44 % Final    Monocytes Relative 06/14/2022 8  4 - 12 % Final    Eosinophils Relative 06/14/2022 2  0 - 6 % Final    Basophils Relative 06/14/2022 1  0 - 1 % Final    Neutrophils Absolute 06/14/2022 4 23  1 85 - 7 62 Thousands/µL Final    Immature Grans Absolute 06/14/2022 0 01  0 00 - 0 20 Thousand/uL Final    Lymphocytes Absolute 06/14/2022 2 49  0 60 - 4 47 Thousands/µL Final    Monocytes Absolute 06/14/2022 0 63  0 17 - 1 22 Thousand/µL Final    Eosinophils Absolute 06/14/2022 0 16  0 00 - 0 61 Thousand/µL Final    Basophils Absolute 06/14/2022 0 05  0 00 - 0 10 Thousands/µL Final    Sodium 06/14/2022 138  136 - 145 mmol/L Final    Potassium 06/14/2022 4 5  3 5 - 5 3 mmol/L Final    Chloride 06/14/2022 110 (A) 100 - 108 mmol/L Final    CO2 06/14/2022 27  21 - 32 mmol/L Final    ANION GAP 06/14/2022 1 (A) 4 - 13 mmol/L Final    BUN 06/14/2022 11  5 - 25 mg/dL Final    Creatinine 06/14/2022 0 82  0 60 - 1 30 mg/dL Final    Standardized to IDMS reference method    Glucose 06/14/2022 91  65 - 140 mg/dL Final    If the patient is fasting, the ADA then defines impaired fasting glucose as > 100 mg/dL and diabetes as > or equal to 123 mg/dL  Specimen collection should occur prior to Sulfasalazine administration due to the potential for falsely depressed results  Specimen collection should occur prior to Sulfapyridine administration due to the potential for falsely elevated results   Calcium 06/14/2022 9 0  8 3 - 10 1 mg/dL Final    AST 06/14/2022 17  5 - 45 U/L Final    Specimen collection should occur prior to Sulfasalazine administration due to the potential for falsely depressed results       ALT 06/14/2022 17  12 - 78 U/L Final    Specimen collection should occur prior to Sulfasalazine and/or Sulfapyridine administration due to the potential for falsely depressed results   Alkaline Phosphatase 06/14/2022 66  46 - 116 U/L Final    Total Protein 06/14/2022 6 8  6 4 - 8 2 g/dL Final    Albumin 06/14/2022 3 6  3 5 - 5 0 g/dL Final    Total Bilirubin 06/14/2022 1 73 (A) 0 20 - 1 00 mg/dL Final    Use of this assay is not recommended for patients undergoing treatment with eltrombopag due to the potential for falsely elevated results   eGFR 06/14/2022 97  ml/min/1 73sq m Final    LD 06/14/2022 206  81 - 234 U/L Final    T3, Free 06/14/2022 2 09 (A) 2 30 - 4 20 pg/mL Final    TSH 3RD GENERATON 06/14/2022 0 585  0 450 - 4 500 uIU/mL Final    The recommended reference ranges for TSH during pregnancy are as follows:   First trimester 0 1 to 2 5 uIU/mL   Second trimester  0 2 to 3 0 uIU/mL   Third trimester 0 3 to 3 0 uIU/m    Note: Normal ranges may not apply to patients who are transgender, non-binary, or whose legal sex, sex at birth, and gender identity differ  Adult TSH (3rd generation) reference range follows the recommended guidelines of the American Thyroid Association, January, 2020   Free T4 06/14/2022 1 12  0 76 - 1 46 ng/dL Final    Specimen collection should occur prior to Sulfasalazine administration due to the potential for falsely elevated results  The following historical data was reviewed  Past Medical History:   Diagnosis Date    Lymphoma [facility])        Past Surgical History:   Procedure Laterality Date    LYMPH NODE BIOPSY Right 8/18/2021    Procedure: BIOPSY LYMPH NODE SENTINEL, INTRAOPERATIVE   LYMPHATIC MAPPING, LYMPHOSCINTIGRAPHY ([facility]C MED INJECT AT 0730);   Surgeon: Murphy Robison MD;  Location: [facility];  Service: Surgical Oncology    SKIN LESION EXCISION Right 8/18/2021    Procedure: EXCISION WIDE LESION UPPER EXTREMITY;  Surgeon: Murphy Robison MD;  Location: [facility];  Service: Surgical Oncology    SPLIT THICKNESS SKIN GRAFT Right 8/18/2021    Procedure: RIGHT SHOULDER  COMPLEX CLOSURE;  Surgeon: Kyle Barnett MD;  Location: [facility];  Service: Plastics    WISDOM TOOTH EXTRACTION         Social History     Socioeconomic History    Marital status: Single     Spouse name: None    Number of children: None    Years of education: None    Highest education level: None   Occupational History    None   Tobacco Use    Smoking status: Former Smoker     Packs/day: 0 25     Years: 1 00     Pack years: 0 25     Types: Cigarettes     Quit date:      Years since quittin 4    Smokeless tobacco: Never Used    Tobacco comment: about 2-3 years ago    Vaping Use    Vaping Use: Never used   Substance and Sexual Activity    Alcohol use: Yes     Alcohol/week: 1 0 standard drink     Types: 1 Cans of beer per week     Comment: rare    Drug use: No    Sexual activity: Yes     Partners: Male   Other Topics Concern    None   Social History Narrative    Denied history of daily coffee consumption      Social Determinants of Health     Financial Resource Strain: Not on file   Food Insecurity: Not on file   Transportation Needs: Not on file   Physical Activity: Not on file   Stress: Not on file   Social Connections: Not on file   Intimate Partner Violence: Not on file   Housing Stability: Not on file       Family History   Problem Relation Age of Onset    No Known Problems Mother     Breast cancer Maternal Grandmother     Breast cancer Maternal Aunt        Please note: This report has been generated by a voice recognition software system  Therefore there may be syntax, spelling, and/or grammatical errors  Please call if you have any questions

## 2022-06-16 NOTE — PROGRESS NOTES
Patient arrives to infusion center for D1 C6 Keytruda today  Patient offers no acute complaints  VSS, labs reviewed from 6/14 and and within parameters for treatment today  PIV inserted in x2 attempts  Patient resting on recliner chair, call bell within reach

## 2022-07-05 ENCOUNTER — TELEPHONE (OUTPATIENT)
Dept: HEMATOLOGY ONCOLOGY | Facility: CLINIC | Age: 32
End: 2022-07-05

## 2022-07-05 NOTE — TELEPHONE ENCOUNTER
Are you able to assist with this? Patient goes to [facility]  Thank you 
CALL RETURN FORM   Reason for patient call? Patient is calling in to reschedule her f/u and Infusion appt on 7/28  Patient would like for the appts to be on the same day  Patient is requesting a call back  Patient's primary oncologist? Soila Chamorro     Name of person the patient was calling for? Rosibel Muñoz     Any additional information to add, if applicable? Patient's best call back number is [phone]    Informed patient that the message will be forwarded to the team and someone will get back to them as soon as possible    Did you relay this information to the patient?  Yes
Date changed 
I was able to reschedule for 8/5 at 1 pm  Please have the date changed and I will adjust her schedule  Thanks!     [facility]
Patient is requesting for her treatment on 7/28 be rescheduled to either 8/3, 8/4 or 8/5? Thank you!
Please change date to 8/5  Thank you!
Spoke with patient  She is aware and okay with updated schedule 
No pertinent family history in first degree relatives

## 2022-07-18 ENCOUNTER — APPOINTMENT (OUTPATIENT)
Dept: LAB | Facility: HOSPITAL | Age: 32
End: 2022-07-18
Payer: COMMERCIAL

## 2022-07-18 DIAGNOSIS — C43.61 MALIGNANT MELANOMA OF SHOULDER, RIGHT (HCC): Primary | ICD-10-CM

## 2022-07-18 DIAGNOSIS — Z79.899 HIGH RISK MEDICATIONS (NOT ANTICOAGULANTS) LONG-TERM USE: ICD-10-CM

## 2022-07-19 ENCOUNTER — TELEPHONE (OUTPATIENT)
Dept: HEMATOLOGY ONCOLOGY | Facility: CLINIC | Age: 32
End: 2022-07-19

## 2022-07-19 NOTE — TELEPHONE ENCOUNTER
Patient completed hcg blood test which was positive for pregnancy  Dr Naya Lerma called patient and discussed that Iain Miller has not been studied in pregnancy and  future treatments are to be cancelled  Patient will need to be scanned with MRI's in the future  Called and spoke with [facility]  They are aware of plan

## 2022-07-21 ENCOUNTER — CONSULT (OUTPATIENT)
Dept: ENDOCRINOLOGY | Facility: CLINIC | Age: 32
End: 2022-07-21
Payer: COMMERCIAL

## 2022-07-21 VITALS
WEIGHT: 151.8 LBS | DIASTOLIC BLOOD PRESSURE: 70 MMHG | HEIGHT: 67 IN | SYSTOLIC BLOOD PRESSURE: 98 MMHG | HEART RATE: 80 BPM | BODY MASS INDEX: 23.83 KG/M2

## 2022-07-21 DIAGNOSIS — C43.61 MALIGNANT MELANOMA OF SHOULDER, RIGHT (HCC): Primary | ICD-10-CM

## 2022-07-21 DIAGNOSIS — Z3A.01 LESS THAN 8 WEEKS GESTATION OF PREGNANCY: ICD-10-CM

## 2022-07-21 DIAGNOSIS — E03.2 HYPOTHYROIDISM DUE TO MEDICATION: Primary | ICD-10-CM

## 2022-07-21 DIAGNOSIS — Z29.8 IMMUNOTHERAPY: ICD-10-CM

## 2022-07-21 PROCEDURE — 99204 OFFICE O/P NEW MOD 45 MIN: CPT | Performed by: STUDENT IN AN ORGANIZED HEALTH CARE EDUCATION/TRAINING PROGRAM

## 2022-07-21 RX ORDER — LEVOTHYROXINE SODIUM 112 UG/1
112 TABLET ORAL DAILY
Qty: 30 TABLET | Refills: 1 | Status: SHIPPED | OUTPATIENT
Start: 2022-07-21 | End: 2022-08-23 | Stop reason: DRUGHIGH

## 2022-07-21 NOTE — PROGRESS NOTES
Polina Rivera 36 y o  female MRN: [id number]    Encounter: [id number]      Assessment/Plan     Assessment: This is a 36y o -year-old female with PMH of stage 2B melanoma of R shoulder on adjuvant therapy of pembrolizumab, hypothyroidism secondary to pembrolizumab treatment who is seen in consultation for management of hypothyroidism in pregnancy  Plan:  Hypothyroidism in pregnancy  - hypothyroidism is secondary to immunotherapy (Pembrolizumab)  Patient is now off of pembrolizumab due to her pregnancy  - TSH in July 2022 0 962 (target around 1), free T4 1 23 (goal high normal range)  - prescribed levothyroxine 112 mcg 1 tab daily from Monday to Saturday, levothyroxine 112 mcg 1 5 tab daily on Sunday, considering patient is 4 weeks pregnant and increasing levothyroxine requirements in pregnancy  - we will repeat TSH, free T4 in 2-3 weeks  - advised the patient to take levothyroxine on an empty stomach, avoid eating or drinking for 30-60 minutes after taking the medication  Advised to continue taking prenatal vitamins at bedtime  - follow-up visit in 3 months  Discussed with the patient about close follow-up for hypothyroidism during pregnancy  Advised the patient to keep levothyroxine 100 mcg and  125 mcg with her, as a lot of dose changes may happen over the course of her pregnancy  CC:   Hypothyroidism    History of Present Illness     HPI:  This is a 36y o -year-old female with PMH of stage 2B melanoma of R shoulder on adjuvant therapy of pembrolizumab, hypothyroidism secondary to pembrolizumab treatment who is seen in consultation for management of hypothyroidism in pregnancy  Pt reports that she was diagnosed with melanoma last year, was started on pembrolizumab in Nov 2021, was diagnosed with hypothyroidism 2/2 immunotherapy in Dec 2021 at which point pt reports to have sx of fatigue and cold intolerance   Pt reports that she was started on levothyroxine supplementation by Dr Debbi hPillips (Oncology), last dose change was made on May 25 th 2022  Currently takes levothyroxine 100 mcg and 125 mcg on alternate days  She denies any energy, weight, skin, hair, nail or body temperature changes  Patient denied any menstrual irregularities prior to her pregnancy  Pt reports to be taking lexapro in the past when the diagnosis of her melanoma was made, however has been off of it since last 1 5 weeks  Pt reports to be compliant with her levothyroxine, takes it on empty stomach, avoids eating or drinking for 30-60 minutes  Of note there is history of hypothyroidism and breast cancer in the maternal side of family  Patient reports that her pregnancy was unplanned, was on OCP, plans to continue with pregnancy, is about 4 weeks pregnant, has yet to see her OBGYN  Pembrolizumab therapy is stopped due to her pregnancy  Patient complains of occasional abdominal cramps and some fatigue  Patient denies any active alcohol or tobacco use  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, currently off of work dated summer break      Review of Systems   Constitutional: Positive for fatigue  Negative for activity change and appetite change  HENT: Negative for congestion and ear pain  Eyes: Positive for visual disturbance  Negative for redness  Respiratory: Negative for cough and shortness of breath  Cardiovascular: Negative for leg swelling  Gastrointestinal: Positive for abdominal pain  Negative for abdominal distention, diarrhea, nausea and vomiting  Endocrine: Negative for cold intolerance, heat intolerance, polydipsia, polyphagia and polyuria  Genitourinary: Negative for difficulty urinating and dysuria  Musculoskeletal: Negative for back pain and gait problem  Skin: Negative for color change and pallor  Neurological: Negative for dizziness and headaches  Psychiatric/Behavioral: Negative for behavioral problems and decreased concentration     All other systems reviewed and are negative  Historical Information   Past Medical History:   Diagnosis Date    Lymphoma [facility])      Past Surgical History:   Procedure Laterality Date    LYMPH NODE BIOPSY Right 2021    Procedure: BIOPSY LYMPH NODE SENTINEL, INTRAOPERATIVE   LYMPHATIC MAPPING, LYMPHOSCINTIGRAPHY ([facility] MED INJECT AT 0730); Surgeon: Delvis Patterson MD;  Location: [facility];  Service: Surgical Oncology    SKIN LESION EXCISION Right 2021    Procedure: EXCISION WIDE LESION UPPER EXTREMITY;  Surgeon: Delvis Patterson MD;  Location: [facility];  Service: Surgical Oncology    SPLIT THICKNESS SKIN GRAFT Right 2021    Procedure: RIGHT SHOULDER  COMPLEX CLOSURE;  Surgeon: Marcelina Clifton MD;  Location: [facility];  Service: Plastics    WISDOM TOOTH EXTRACTION       Social History   Social History     Substance and Sexual Activity   Alcohol Use Yes    Alcohol/week: 1 0 standard drink    Types: 1 Cans of beer per week    Comment: rare     Social History     Substance and Sexual Activity   Drug Use No     Social History     Tobacco Use   Smoking Status Former Smoker    Packs/day: 0 25    Years: 1 00    Pack years: 0 25    Types: Cigarettes    Quit date:     Years since quittin 5   Smokeless Tobacco Never Used   Tobacco Comment    about 2-3 years ago      Family History:   Family History   Problem Relation Age of Onset    No Known Problems Mother     Breast cancer Maternal Grandmother     Breast cancer Maternal Aunt        Meds/Allergies   Current Outpatient Medications   Medication Sig Dispense Refill    clindamycin (CLEOCIN T) 1 % lotion Apply to face      levothyroxine (Levoxyl) 100 mcg tablet Take 1 tablet (100 mcg total) by mouth daily 30 tablet 3    levothyroxine (Synthroid) 125 mcg tablet Take 1 tablet (125 mcg total) by mouth daily Alternate between 125 mcg and 100 mcg every other day   15 tablet 3    levothyroxine 112 mcg tablet Take 1 tablet (112 mcg total) by mouth daily Take 1 tab by mouth from Monday to Saturday and 1 5tab on Sunday 30 tablet 1    norethindrone (MICRONOR) 0 35 MG tablet Take 0 35 mg by mouth daily      escitalopram (LEXAPRO) 10 mg tablet Take 1 tablet (10 mg total) by mouth daily (Patient not taking: Reported on 7/21/2022) 30 tablet 3    levothyroxine (Synthroid) 100 mcg tablet Take 1 tablet (100 mcg total) by mouth daily Alternate between 125 mcg and 100 mcg every other day  (Patient not taking: Reported on 7/21/2022) 15 tablet 3    ondansetron (ZOFRAN) 4 mg tablet Take 1 tablet (4 mg total) by mouth every 8 (eight) hours as needed for nausea or vomiting (Patient not taking: Reported on 7/21/2022) 20 tablet 0    pembrolizumab (KEYTRUDA) IVPB 400 mg IV over 30 minutes every 6 weeks for a total of 9 infusions  (Patient not taking: Reported on 7/21/2022) 400 mg 9    prochlorperazine (COMPAZINE) 10 mg tablet Take 1 tablet (10 mg total) by mouth every 6 (six) hours as needed for nausea or vomiting (Patient not taking: Reported on 7/21/2022) 30 tablet 0    tretinoin (RETIN-A) 0 025 % cream APPLY A PEA-SIZED AMOUNT TO FACE AT NIGHT  (Patient not taking: Reported on 7/21/2022)       No current facility-administered medications for this visit  Allergies   Allergen Reactions    Strawberry Extract - Food Allergy Swelling       Objective   Vitals: Blood pressure 98/70, pulse 80, height 5' 7" (1 702 m), weight 68 9 kg (151 lb 12 8 oz)  Physical Exam  Constitutional:       Appearance: Normal appearance  HENT:      Head: Normocephalic and atraumatic  Cardiovascular:      Rate and Rhythm: Normal rate and regular rhythm  Pulses: Normal pulses  Heart sounds: Normal heart sounds  No murmur heard  No gallop  Pulmonary:      Effort: Pulmonary effort is normal       Breath sounds: Normal breath sounds  No wheezing or rales  Abdominal:      Palpations: Abdomen is soft  There is no mass  Tenderness: There is no abdominal tenderness     Musculoskeletal: General: No swelling  Cervical back: Normal range of motion and neck supple  No tenderness  Right lower leg: No edema  Left lower leg: No edema  Lymphadenopathy:      Cervical: No cervical adenopathy  Skin:     General: Skin is warm  Findings: No lesion  Neurological:      Mental Status: She is alert and oriented to person, place, and time  Mental status is at baseline  Psychiatric:         Mood and Affect: Mood normal          Behavior: Behavior normal          The history was obtained from the review of the chart, patient  Lab Results:   Lab Results   Component Value Date/Time    TSH 3RD GENERATON 0 962 07/18/2022 05:03 PM    TSH 3RD GENERATON 0 585 06/14/2022 04:53 PM    TSH 3RD GENERATON 0 236 (L) 05/24/2022 07:46 AM    Free T4 1 23 07/18/2022 05:03 PM    Free T4 1 12 06/14/2022 04:53 PM    Free T4 1 23 05/24/2022 07:46 AM       Imaging Studies:       I have personally reviewed pertinent reports  Portions of the record may have been created with voice recognition software  Occasional wrong word or "sound a like" substitutions may have occurred due to the inherent limitations of voice recognition software  Read the chart carefully and recognize, using context, where substitutions have occurred

## 2022-08-03 ENCOUNTER — ULTRASOUND (OUTPATIENT)
Dept: PERINATAL CARE | Facility: OTHER | Age: 32
End: 2022-08-03
Payer: COMMERCIAL

## 2022-08-03 ENCOUNTER — OFFICE VISIT (OUTPATIENT)
Dept: HEMATOLOGY ONCOLOGY | Facility: CLINIC | Age: 32
End: 2022-08-03
Payer: COMMERCIAL

## 2022-08-03 VITALS
WEIGHT: 154 LBS | DIASTOLIC BLOOD PRESSURE: 74 MMHG | TEMPERATURE: 98 F | SYSTOLIC BLOOD PRESSURE: 120 MMHG | BODY MASS INDEX: 24.17 KG/M2 | HEIGHT: 67 IN | RESPIRATION RATE: 16 BRPM | OXYGEN SATURATION: 98 % | HEART RATE: 72 BPM

## 2022-08-03 VITALS
WEIGHT: 153 LBS | HEIGHT: 67 IN | HEART RATE: 69 BPM | DIASTOLIC BLOOD PRESSURE: 75 MMHG | BODY MASS INDEX: 24.01 KG/M2 | SYSTOLIC BLOOD PRESSURE: 122 MMHG

## 2022-08-03 DIAGNOSIS — C43.61 MALIGNANT MELANOMA OF SHOULDER, RIGHT (HCC): ICD-10-CM

## 2022-08-03 DIAGNOSIS — Z29.8 IMMUNOTHERAPY: ICD-10-CM

## 2022-08-03 DIAGNOSIS — Z79.899 HIGH RISK MEDICATIONS (NOT ANTICOAGULANTS) LONG-TERM USE: ICD-10-CM

## 2022-08-03 DIAGNOSIS — O9A.111: Primary | ICD-10-CM

## 2022-08-03 DIAGNOSIS — O9A.111: ICD-10-CM

## 2022-08-03 DIAGNOSIS — Z36.89 ENCOUNTER TO ESTABLISH GESTATIONAL AGE USING ULTRASOUND: ICD-10-CM

## 2022-08-03 DIAGNOSIS — Z3A.01 LESS THAN 8 WEEKS GESTATION OF PREGNANCY: ICD-10-CM

## 2022-08-03 DIAGNOSIS — E03.9 HYPOTHYROIDISM, UNSPECIFIED TYPE: ICD-10-CM

## 2022-08-03 DIAGNOSIS — C43.61 MALIGNANT MELANOMA OF SHOULDER, RIGHT (HCC): Primary | ICD-10-CM

## 2022-08-03 PROCEDURE — 76801 OB US < 14 WKS SINGLE FETUS: CPT | Performed by: OBSTETRICS & GYNECOLOGY

## 2022-08-03 PROCEDURE — 99205 OFFICE O/P NEW HI 60 MIN: CPT | Performed by: OBSTETRICS & GYNECOLOGY

## 2022-08-03 PROCEDURE — 99214 OFFICE O/P EST MOD 30 MIN: CPT | Performed by: INTERNAL MEDICINE

## 2022-08-03 NOTE — PROGRESS NOTES
Ultrasound Probe Disinfection    A transvaginal ultrasound was performed  Prior to use, disinfection was performed with High Level Disinfection Process (Trophon)  Probe serial number [id number]: [id number] was used        Nickie Maza RDMS  08/03/22  10:25 AM

## 2022-08-03 NOTE — LETTER
August 8, 2022     Jg DO Cristal  [address] [address]    Patient: Jorge Wong   YOB: 1990   Date of Visit: 8/3/2022       Dear Dr Penelope Dunbar: Thank you for referring Allenmichael Jasso to me for evaluation  Below are my notes for this consultation  If you have questions, please do not hesitate to call me  I look forward to following your patient along with you  Sincerely,        Kellie Weller MD        CC: Sanjay Lott MD  OscarMD Olga Franklin, Jeff Flores MD  8/8/2022 12:06 AM  Sign when Signing Visit  [address] [place] [address]  [phone]     Date of Visit: 8/3/2022  Name: Jorge Wong   YOB: 1990        Subjective    VISIT DIAGNOSIS:  Diagnoses and all orders for this visit:    Malignant melanoma of shoulder, right ([facility] [address] )  -     MRI soft tissue neck wo and w contrast; Future  -     MRI chest wall wo and w contrast; Future  -     MRI abdomen w wo contrast; Future  -     MRI pelvis w wo contrast; Future  -     CBC and differential; Future  -     Comprehensive metabolic panel; Future  -     LD,Blood; Future  -     T3, free; Future  -     T4, free; Future  -     TSH, 3rd generation; Future    Less than 8 weeks gestation of pregnancy  -     MRI soft tissue neck wo and w contrast; Future  -     MRI chest wall wo and w contrast; Future  -     MRI abdomen w wo contrast; Future  -     MRI pelvis w wo contrast; Future  -     CBC and differential; Future  -     Comprehensive metabolic panel; Future  -     LD,Blood; Future  -     T3, free; Future  -     T4, free; Future  -     TSH, 3rd generation;  Future    Cancer complicating pregnancy in first trimester    High risk medications (not anticoagulants) long-term use    Immunotherapy    Hypothyroidism, unspecified type        Oncology History   Malignant melanoma of shoulder, right ([facility] [address] )   7/8/2021 Biopsy    Right anterior shoulder:  - Ulcerated malignant melanoma, 2 5mm    Ulceration: Yes  Mitosis: >1    NGS Testing  MS-Stable  TMB - 13Muts/Mb  CDKN2A E10  TERT promoter -124C>T       7/8/2021 Genomic Testing    DecisionDx - Class 2B     7/8/2021 -  Cancer Staged    Staging form: Melanoma of the Skin, AJCC 8th Edition  - Pathologic stage from 7/8/2021: Stage IIB (pT3b, pN0, cM0) - Signed by Vibha Kunz MD on 9/20/2021  Stage prefix: Initial diagnosis       8/18/2021 Surgery    Wide excision melanoma with sentinel lymph node biopsy  No residual melanoma  LNs negative - 0/9 LNs     11/17/2021 - 6/16/2022 Chemotherapy    pembrolizumab (KEYTRUDA) IVPB, 400 mg, Intravenous, Once, 6 of 9 cycles  Administration: 400 mg (11/17/2021), 400 mg (12/29/2021), 400 mg (2/7/2022), 400 mg (3/21/2022), 400 mg (5/2/2022), 400 mg (6/16/2022)        Cancer Staging  Malignant melanoma of shoulder, right (HCC)  Staging form: Melanoma of the Skin, AJCC 8th Edition  - Pathologic stage from 7/8/2021: Stage IIB (pT3b, pN0, cM0) - Signed by Vibha Kunz MD on 9/20/2021     Treatment Details   Treatment goal Curative   Plan Name OP Pembrolizumab Q 42 Days   Status Inactive   Start Date 11/17/2021   End Date 6/16/2022   Provider Vibha Kunz MD   Chemotherapy pembrolizumab [facility]) IVPB, 400 mg, Intravenous, Once, 6 of 9 cycles  Administration: 400 mg (11/17/2021), 400 mg (12/29/2021), 400 mg (2/7/2022), 400 mg (3/21/2022), 400 mg (5/2/2022), 400 mg (6/16/2022)          HISTORY OF PRESENT ILLNESS: Alda Redd is a 36 y o  female  who has stage II B melanoma he was on treatment with adjuvant pembrolizumab he now is approximately 5-6 weeks pregnant here for follow-up  She is nervous about finding out she is pregnant in the setting of having recent treatment  She had treatment with pembrolizumab on 06/16/2022 and at that time her pregnancy test was negative    She states that over the course of the past few weeks she became increasingly fatigued and thought it was her thyroid  She also noted pulling and tugging in her abdomen she left her daughter  She took a home pregnancy test which was positive  We performed quantitative HCG which demonstrated approximately 4 weeks pregnant  We did send her to endocrinology as she has hypothyroidism and will need close monitoring and adjustment of levothyroxine, as there is increased needs during pregnancy  We also referred her to maternal fetal medicine as she was receiving adjuvant immunotherapy with pembrolizumab  I contacted [facility] and there is no information or data regarding outcomes in patients who were receiving immune checkpoint inhibitor and then became pregnant, especially as such an early gestation  She did see MFM this morning and had an ultrasound with appropriate imaging findings at this time  She will follow along with MFM for close monitoring  She denies any headaches, double vision, rash, itching, diarrhea  REVIEW OF SYSTEMS:  Review of Systems   Constitutional: Positive for fatigue  Negative for appetite change, fever and unexpected weight change  HENT:   Negative for lump/mass  Eyes: Negative for icterus  Respiratory: Negative for cough, shortness of breath and wheezing  Cardiovascular: Negative for leg swelling  Gastrointestinal: Negative for abdominal pain, constipation, diarrhea, nausea and vomiting  Genitourinary: Negative for difficulty urinating and hematuria  Musculoskeletal: Negative for arthralgias, gait problem and myalgias  Skin: Negative for itching and rash  No new, changing, or concerning lesions  Neurological: Negative for extremity weakness, gait problem, headaches, light-headedness and numbness  Hematological: Negative for adenopathy          MEDICATIONS:    Current Outpatient Medications:     clindamycin (CLEOCIN T) 1 % lotion, Apply to face, Disp: , Rfl:     levothyroxine 112 mcg tablet, Take 1 tablet (112 mcg total) by mouth daily Take 1 tab by mouth from Monday to Saturday and 1 5tab on , Disp: 30 tablet, Rfl: 1    norethindrone (MICRONOR) 0 35 MG tablet, Take 0 35 mg by mouth daily, Disp: , Rfl:      ALLERGIES:  Allergies   Allergen Reactions    Strawberry Extract - Food Allergy Swelling        ACTIVE PROBLEMS:  Patient Active Problem List   Diagnosis    ADD (attention deficit disorder) without hyperactivity    COVID-19    Malignant melanoma of shoulder, right ([facility] [address] )    Family history of breast cancer    High risk medications (not anticoagulants) long-term use    Immunotherapy    Hypothyroidism    Cancer complicating pregnancy in first trimester          PAST MEDICAL HISTORY:   Past Medical History:   Diagnosis Date    Lymphoma ([facility] [address] )         PAST SURGICAL HISTORY:  Past Surgical History:   Procedure Laterality Date    LYMPH NODE BIOPSY Right 2021    Procedure: BIOPSY LYMPH NODE SENTINEL, INTRAOPERATIVE   LYMPHATIC MAPPING, LYMPHOSCINTIGRAPHY (NUC MED INJECT AT 0730);   Surgeon: Ben Porras MD;  Location: [facility];  Service: Surgical Oncology    SKIN LESION EXCISION Right 2021    Procedure: EXCISION WIDE LESION UPPER EXTREMITY;  Surgeon: Ben Porras MD;  [facility]: [facility];  Service: Surgical Oncology    SPLIT THICKNESS SKIN GRAFT Right 2021    Procedure: RIGHT SHOULDER  COMPLEX CLOSURE;  Surgeon: Saulo Gomez MD;  Location: [facility];  Service: Plastics    WISDOM TOOTH EXTRACTION          SOCIAL HISTORY:  Social History     Socioeconomic History    Marital status: Single     Spouse name: None    Number of children: None    Years of education: None    Highest education level: None   Occupational History    None   Tobacco Use    Smoking status: Former Smoker     Packs/day: 0 25     Years: 1 00     Pack years: 0 25     Types: Cigarettes     Quit date:      Years since quittin 6    Smokeless tobacco: Never Used    Tobacco comment: about 2-3 years ago    Vaping Use    Vaping Use: Never used   Substance and Sexual Activity    Alcohol use: Yes     Alcohol/week: 1 0 standard drink     Types: 1 Cans of beer per week     Comment: rare    Drug use: No    Sexual activity: Yes     Partners: Male   Other Topics Concern    None   Social History Narrative    Denied history of daily coffee consumption      Social Determinants of Health     Financial Resource Strain: Not on file   Food Insecurity: Not on file   Transportation Needs: Not on file   Physical Activity: Not on file   Stress: Not on file   Social Connections: Not on file   Intimate Partner Violence: Not on file   Housing Stability: Not on file        FAMILY HISTORY:  Family History   Problem Relation Age of Onset    No Known Problems Mother     Breast cancer Maternal Grandmother     Breast cancer Maternal Aunt            Objective    PHYSICAL EXAMINATION:   Blood pressure 120/74, pulse 72, temperature 98 °F (36 7 °C), temperature source Temporal, resp  rate 16, height 5' 7" (1 702 m), weight 69 9 kg (154 lb), SpO2 98 %  Pain Score: 0-No pain     ECOG Performance Status    Flowsheet Row Most Recent Value   ECOG Performance Status 0 - Fully active, able to carry on all pre-disease performance without restriction             Physical Exam  Constitutional:       General: She is not in acute distress  Appearance: Normal appearance  She is not toxic-appearing  HENT:      Mouth/Throat:      Mouth: Mucous membranes are moist       Pharynx: Oropharynx is clear  Eyes:      General: No scleral icterus  Cardiovascular:      Rate and Rhythm: Normal rate and regular rhythm  Pulses: Normal pulses  Heart sounds: No murmur heard  No friction rub  No gallop  Pulmonary:      Effort: Pulmonary effort is normal  No respiratory distress  Breath sounds: Normal breath sounds  No wheezing or rales  Chest:   Breasts:      Right: No axillary adenopathy or supraclavicular adenopathy  Left: No axillary adenopathy or supraclavicular adenopathy  Abdominal:      General: There is no distension  Palpations: There is no mass  Tenderness: There is no abdominal tenderness  There is no rebound  Musculoskeletal:         General: No swelling or tenderness  Right lower leg: No edema  Left lower leg: No edema  Lymphadenopathy:      Head:      Right side of head: No submandibular, preauricular or posterior auricular adenopathy  Left side of head: No submandibular, preauricular or posterior auricular adenopathy  Cervical: No cervical adenopathy  Right cervical: No superficial or posterior cervical adenopathy  Left cervical: No superficial or posterior cervical adenopathy  Upper Body:      Right upper body: No supraclavicular or axillary adenopathy  Left upper body: No supraclavicular or axillary adenopathy  Skin:     Findings: No rash  Comments: Well healed surgical scar  No evidence of recurrence at primary site  Neurological:      General: No focal deficit present  Mental Status: She is alert and oriented to person, place, and time  Psychiatric:         Mood and Affect: Mood normal          Behavior: Behavior normal          Thought Content: Thought content normal          Judgment: Judgment normal          I reviewed lab data in the chart      WBC   Date Value Ref Range Status   07/18/2022 8 44 4 31 - 10 16 Thousand/uL Final   06/14/2022 7 57 4 31 - 10 16 Thousand/uL Final   05/24/2022 5 05 4 31 - 10 16 Thousand/uL Final     Hemoglobin   Date Value Ref Range Status   07/18/2022 13 7 11 5 - 15 4 g/dL Final   06/14/2022 13 6 11 5 - 15 4 g/dL Final   05/24/2022 14 0 11 5 - 15 4 g/dL Final     Platelets   Date Value Ref Range Status   07/18/2022 273 149 - 390 Thousands/uL Final   06/14/2022 275 149 - 390 Thousands/uL Final   05/24/2022 275 149 - 390 Thousands/uL Final     MCV   Date Value Ref Range Status   07/18/2022 88 82 - 98 fL Final   06/14/2022 90 82 - 98 fL Final   05/24/2022 90 82 - 98 fL Final      Potassium   Date Value Ref Range Status   07/18/2022 5 0 3 5 - 5 3 mmol/L Final   06/14/2022 4 5 3 5 - 5 3 mmol/L Final   05/24/2022 5 0 3 5 - 5 3 mmol/L Final     Chloride   Date Value Ref Range Status   07/18/2022 105 96 - 108 mmol/L Final   06/14/2022 110 (H) 100 - 108 mmol/L Final   05/24/2022 111 (H) 100 - 108 mmol/L Final     CO2   Date Value Ref Range Status   07/18/2022 29 21 - 32 mmol/L Final   06/14/2022 27 21 - 32 mmol/L Final   05/24/2022 30 21 - 32 mmol/L Final     BUN   Date Value Ref Range Status   07/18/2022 11 5 - 25 mg/dL Final   06/14/2022 11 5 - 25 mg/dL Final   05/24/2022 11 5 - 25 mg/dL Final     Creatinine   Date Value Ref Range Status   07/18/2022 0 75 0 60 - 1 30 mg/dL Final     Comment:     Standardized to IDMS reference method   06/14/2022 0 82 0 60 - 1 30 mg/dL Final     Comment:     Standardized to IDMS reference method   05/24/2022 0 83 0 60 - 1 30 mg/dL Final     Comment:     Standardized to IDMS reference method     Glucose   Date Value Ref Range Status   07/18/2022 82 65 - 140 mg/dL Final     Comment:     If the patient is fasting, the ADA then defines impaired fasting glucose as > 100 mg/dL and diabetes as > or equal to 123 mg/dL  Specimen collection should occur prior to Sulfasalazine administration due to the potential for falsely depressed results  Specimen collection should occur prior to Sulfapyridine administration due to the potential for falsely elevated results  06/14/2022 91 65 - 140 mg/dL Final     Comment:     If the patient is fasting, the ADA then defines impaired fasting glucose as > 100 mg/dL and diabetes as > or equal to 123 mg/dL  Specimen collection should occur prior to Sulfasalazine administration due to the potential for falsely depressed results  Specimen collection should occur prior to Sulfapyridine administration due to the potential for falsely elevated results     02/02/2022 122 65 - 140 mg/dL Final     Comment:     If the patient is fasting, the ADA then defines impaired fasting glucose as > 100 mg/dL and diabetes as > or equal to 123 mg/dL  Specimen collection should occur prior to Sulfasalazine administration due to the potential for falsely depressed results  Specimen collection should occur prior to Sulfapyridine administration due to the potential for falsely elevated results  Calcium   Date Value Ref Range Status   07/18/2022 9 0 8 3 - 10 1 mg/dL Final   06/14/2022 9 0 8 3 - 10 1 mg/dL Final   05/24/2022 9 0 8 3 - 10 1 mg/dL Final     Albumin   Date Value Ref Range Status   07/18/2022 3 7 3 5 - 5 0 g/dL Final   06/14/2022 3 6 3 5 - 5 0 g/dL Final   05/24/2022 3 5 3 5 - 5 0 g/dL Final     Total Bilirubin   Date Value Ref Range Status   07/18/2022 1 32 (H) 0 20 - 1 00 mg/dL Final     Comment:     Use of this assay is not recommended for patients undergoing treatment with eltrombopag due to the potential for falsely elevated results  06/14/2022 1 73 (H) 0 20 - 1 00 mg/dL Final     Comment:     Use of this assay is not recommended for patients undergoing treatment with eltrombopag due to the potential for falsely elevated results  05/24/2022 1 23 (H) 0 20 - 1 00 mg/dL Final     Comment:     Use of this assay is not recommended for patients undergoing treatment with eltrombopag due to the potential for falsely elevated results  Alkaline Phosphatase   Date Value Ref Range Status   07/18/2022 66 46 - 116 U/L Final   06/14/2022 66 46 - 116 U/L Final   05/24/2022 65 46 - 116 U/L Final     AST   Date Value Ref Range Status   07/18/2022 14 5 - 45 U/L Final     Comment:     Specimen collection should occur prior to Sulfasalazine administration due to the potential for falsely depressed results  06/14/2022 17 5 - 45 U/L Final     Comment:     Specimen collection should occur prior to Sulfasalazine administration due to the potential for falsely depressed results      05/24/2022 19 5 - 45 U/L Final     Comment:     Specimen collection should occur prior to Sulfasalazine administration due to the potential for falsely depressed results  ALT   Date Value Ref Range Status   07/18/2022 15 12 - 78 U/L Final     Comment:     Specimen collection should occur prior to Sulfasalazine administration due to the potential for falsely depressed results  06/14/2022 17 12 - 78 U/L Final     Comment:     Specimen collection should occur prior to Sulfasalazine and/or Sulfapyridine administration due to the potential for falsely depressed results  05/24/2022 21 12 - 78 U/L Final     Comment:     Specimen collection should occur prior to Sulfasalazine and/or Sulfapyridine administration due to the potential for falsely depressed results  LD   Date Value Ref Range Status   07/18/2022 155 81 - 234 U/L Final   06/14/2022 206 81 - 234 U/L Final   05/24/2022 286 (H) 81 - 234 U/L Final     No results found for: TSH  No results found for: P5LCVTS   Free T4   Date Value Ref Range Status   07/18/2022 1 23 0 76 - 1 46 ng/dL Final     Comment:     Specimen collection should occur prior to Sulfasalazine administration due to the potential for falsely elevated results  06/14/2022 1 12 0 76 - 1 46 ng/dL Final     Comment:     Specimen collection should occur prior to Sulfasalazine administration due to the potential for falsely elevated results  05/24/2022 1 23 0 76 - 1 46 ng/dL Final     Comment:     Specimen collection should occur prior to Sulfasalazine administration due to the potential for falsely elevated results  RECENT IMAGING:  No results found  Assessment/Plan  Ms Rivera is a 36 yr female with stage II B melanoma who received treatment with adjuvant pembrolizumab who is now pregnant here for follow-up  1  Malignant melanoma of shoulder, right ([address] )  She tolerated treatment with adjuvant pembrolizumab  Labs reviewed    She is newly pregnant approximately 4-6 weeks in following with endocrinology and MFM  She will also follow with her OB as well  We discussed that there is just not information regarding pregnancy in a patient on pembrolizumab  We will continue to monitor expectantly  She knows to call with any issues or concerns  We will hold any additional treatment with immunotherapy/pembrolizumab  We will continue to monitor for side effects as side effects can occur up to 1 year following treatment  We will monitor her with close surveillance while she is pregnant and following giving birth as well  She is due for surveillance scans in October and we will make these MRIs as she should not get CT scans  She knows to call with any issues or concerns prior to her next visit  All orders placed for imaging and blood work and scripts provided  - MRI soft tissue neck wo and w contrast; Future  - MRI chest wall wo and w contrast; Future  - MRI abdomen w wo contrast; Future  - MRI pelvis w wo contrast; Future  - CBC and differential; Future  - Comprehensive metabolic panel; Future  - LD,Blood; Future  - T3, free; Future  - T4, free; Future  - TSH, 3rd generation; Future    2  Less than 8 weeks gestation of pregnancy  3  Cancer complicating pregnancy in first trimester  Patient found to be pregnant approximately 4 weeks after receiving her 6th cycle of pembrolizumab  We will discontinue treatment as there is no data for outcomes with immunotherapy and pregnancy  I did speak with [facility] and was unable to find any information regarding outcomes of pregnancies in patients who were treated with immunotherapy  There is 1 case were patient was treated with pembrolizumab at 20 weeks gestation through 28 weeks gestation and then having the baby delivered early, but this does not address the concerns of having been on immunotherapy at approximately 4 weeks gestation  We will monitor her closely  I have spoken with Dr Matilda Scott from and them as well    She is followed closely with MFM as well  We will adjust our scanning to MRIs given need for surveillance imaging with an increased risk of melanoma recurrence, but her inability to get CT scans     - MRI soft tissue neck wo and w contrast; Future  - MRI chest wall wo and w contrast; Future  - MRI abdomen w wo contrast; Future  - MRI pelvis w wo contrast; Future  - CBC and differential; Future  - Comprehensive metabolic panel; Future  - LD,Blood; Future  - T3, free; Future  - T4, free; Future  - TSH, 3rd generation; Future      4  High risk medications (not anticoagulants) long-term use  5  Immunotherapy  6  Hypothyroidism, unspecified type  She was on treatment with immunotherapy and we will continue to monitor for side effects and lab abnormalities  We will monitor labs to ensure safety as side effects can occur up to a year after treatment  She knows to watch for signs and symptoms for additional immune mediated side effects  She is following closely with Endocrinology given her hypothyroidism and pregnancy  Needs close follow-up and adjustment of levothyroxine due to increased need during pregnancy  She knows to call with any issues or concerns prior to her next visit  She is due for follow-up in October with imaging  All orders placed in script provided for imaging and blood work      Stephanie Guzman MD, PhD

## 2022-08-03 NOTE — LETTER
August 7, 2022     Dylan Lakhani MD  [address] [facility] [address]    Patient: Marian Jack   YOB: 1990   Date of Visit: 8/3/2022       Dear Dr Linda Watson: Thank you for referring Katie Salcidoudder to me for evaluation  Below are my notes for this consultation  If you have questions, please do not hesitate to call me  I look forward to following your patient along with you           Sincerely,        Yasmin Brown MD        CC: No Recipients

## 2022-08-07 PROBLEM — O9A.111: Status: ACTIVE | Noted: 2022-08-07

## 2022-08-08 NOTE — PROGRESS NOTES
[facility] HEMATOLOGY ONCOLOGY SPECIALISTS [facility]  [address] [address]  [phone]     Date of Visit: 8/3/2022  Name: Ghislaine Ramon   YOB: 1990        Subjective    VISIT DIAGNOSIS:  Diagnoses and all orders for this visit:    Malignant melanoma of shoulder, right ([address] )  -     MRI soft tissue neck wo and w contrast; Future  -     MRI chest wall wo and w contrast; Future  -     MRI abdomen w wo contrast; Future  -     MRI pelvis w wo contrast; Future  -     CBC and differential; Future  -     Comprehensive metabolic panel; Future  -     LD,Blood; Future  -     T3, free; Future  -     T4, free; Future  -     TSH, 3rd generation; Future    Less than 8 weeks gestation of pregnancy  -     MRI soft tissue neck wo and w contrast; Future  -     MRI chest wall wo and w contrast; Future  -     MRI abdomen w wo contrast; Future  -     MRI pelvis w wo contrast; Future  -     CBC and differential; Future  -     Comprehensive metabolic panel; Future  -     LD,Blood; Future  -     T3, free; Future  -     T4, free; Future  -     TSH, 3rd generation;  Future    Cancer complicating pregnancy in first trimester    High risk medications (not anticoagulants) long-term use    Immunotherapy    Hypothyroidism, unspecified type        Oncology History   Malignant melanoma of shoulder, right ([address] )   7/8/2021 Biopsy    Right anterior shoulder:  - Ulcerated malignant melanoma, 2 5mm    Ulceration: Yes  Mitosis: >1    NGS Testing  MS-Stable  TMB - 13Muts/Mb  CDKN2A E10  TERT promoter -124C>T       7/8/2021 Genomic Testing    DecisionDx - Class 2B     7/8/2021 -  Cancer Staged    Staging form: Melanoma of the Skin, AJCC 8th Edition  - Pathologic stage from 7/8/2021: Stage IIB (pT3b, pN0, cM0) - Signed by Yfn Jimenez MD on 9/20/2021  Stage prefix: Initial diagnosis       8/18/2021 Surgery    Wide excision melanoma with sentinel lymph node biopsy  No residual melanoma  LNs negative - 0/9 LNs     11/17/2021 - 6/16/2022 Chemotherapy    pembrolizumab (KEYTRUDA) IVPB, 400 mg, Intravenous, Once, 6 of 9 cycles  Administration: 400 mg (11/17/2021), 400 mg (12/29/2021), 400 mg (2/7/2022), 400 mg (3/21/2022), 400 mg (5/2/2022), 400 mg (6/16/2022)        Cancer Staging  Malignant melanoma of shoulder, right (HCC)  Staging form: Melanoma of the Skin, AJCC 8th Edition  - Pathologic stage from 7/8/2021: Stage IIB (pT3b, pN0, cM0) - Signed by Channing Nino MD on 9/20/2021     Treatment Details   Treatment goal Curative   Plan Name OP Pembrolizumab Q 42 Days   Status Inactive   Start Date 11/17/2021   End Date 6/16/2022   Provider Channing Nino MD   Chemotherapy pembrolizumab [facility]) IVPB, 400 mg, Intravenous, Once, 6 of 9 cycles  Administration: 400 mg (11/17/2021), 400 mg (12/29/2021), 400 mg (2/7/2022), 400 mg (3/21/2022), 400 mg (5/2/2022), 400 mg (6/16/2022)          HISTORY OF PRESENT ILLNESS: Radha Forman is a 36 y o  female  who has stage II B melanoma he was on treatment with adjuvant pembrolizumab he now is approximately 5-6 weeks pregnant here for follow-up  She is nervous about finding out she is pregnant in the setting of having recent treatment  She had treatment with pembrolizumab on 06/16/2022 and at that time her pregnancy test was negative  She states that over the course of the past few weeks she became increasingly fatigued and thought it was her thyroid  She also noted pulling and tugging in her abdomen she left her daughter  She took a home pregnancy test which was positive  We performed quantitative HCG which demonstrated approximately 4 weeks pregnant  We did send her to endocrinology as she has hypothyroidism and will need close monitoring and adjustment of levothyroxine, as there is increased needs during pregnancy  We also referred her to maternal fetal medicine as she was receiving adjuvant immunotherapy with pembrolizumab    I contacted [employer] and there is no information or data regarding outcomes in patients who were receiving immune checkpoint inhibitor and then became pregnant, especially as such an early gestation  She did see MFM this morning and had an ultrasound with appropriate imaging findings at this time  She will follow along with MFM for close monitoring  She denies any headaches, double vision, rash, itching, diarrhea  REVIEW OF SYSTEMS:  Review of Systems   Constitutional: Positive for fatigue  Negative for appetite change, fever and unexpected weight change  HENT:   Negative for lump/mass  Eyes: Negative for icterus  Respiratory: Negative for cough, shortness of breath and wheezing  Cardiovascular: Negative for leg swelling  Gastrointestinal: Negative for abdominal pain, constipation, diarrhea, nausea and vomiting  Genitourinary: Negative for difficulty urinating and hematuria  Musculoskeletal: Negative for arthralgias, gait problem and myalgias  Skin: Negative for itching and rash  No new, changing, or concerning lesions  Neurological: Negative for extremity weakness, gait problem, headaches, light-headedness and numbness  Hematological: Negative for adenopathy          MEDICATIONS:    Current Outpatient Medications:     clindamycin (CLEOCIN T) 1 % lotion, Apply to face, Disp: , Rfl:     levothyroxine 112 mcg tablet, Take 1 tablet (112 mcg total) by mouth daily Take 1 tab by mouth from Monday to Saturday and 1 5tab on Sunday, Disp: 30 tablet, Rfl: 1    norethindrone (MICRONOR) 0 35 MG tablet, Take 0 35 mg by mouth daily, Disp: , Rfl:      ALLERGIES:  Allergies   Allergen Reactions    Strawberry Extract - Food Allergy Swelling        ACTIVE PROBLEMS:  Patient Active Problem List   Diagnosis    ADD (attention deficit disorder) without hyperactivity    COVID-19    Malignant melanoma of shoulder, right ([address] )    Family history of breast cancer    High risk medications (not anticoagulants) long-term use    Immunotherapy    Hypothyroidism    Cancer complicating pregnancy in first trimester          PAST MEDICAL HISTORY:   Past Medical History:   Diagnosis Date    Lymphoma [facility])         PAST SURGICAL HISTORY:  Past Surgical History:   Procedure Laterality Date    LYMPH NODE BIOPSY Right 2021    Procedure: BIOPSY LYMPH NODE SENTINEL, INTRAOPERATIVE   LYMPHATIC MAPPING, LYMPHOSCINTIGRAPHY (NUC MED INJECT AT 0730); Surgeon: Rishabh Martin MD;  [facility];  Service: Surgical Oncology    SKIN LESION EXCISION Right 2021    Procedure: EXCISION WIDE LESION UPPER EXTREMITY;  Surgeon: Rishabh Martin MD;  [facility];  Service: Surgical Oncology    SPLIT THICKNESS SKIN GRAFT Right 2021    Procedure: RIGHT SHOULDER  COMPLEX CLOSURE;  Surgeon: Osmin Guerra MD;  Location: [facility];  Service: Plastics    WISDOM TOOTH EXTRACTION          SOCIAL HISTORY:  Social History     Socioeconomic History    Marital status: Single     Spouse name: None    Number of children: None    Years of education: None    Highest education level: None   Occupational History    None   Tobacco Use    Smoking status: Former Smoker     Packs/day: 0 25     Years: 1 00     Pack years: 0 25     Types: Cigarettes     Quit date:      Years since quittin 6    Smokeless tobacco: Never Used    Tobacco comment: about 2-3 years ago    Vaping Use    Vaping Use: Never used   Substance and Sexual Activity    Alcohol use:  Yes     Alcohol/week: 1 0 standard drink     Types: 1 Cans of beer per week     Comment: rare    Drug use: No    Sexual activity: Yes     Partners: Male   Other Topics Concern    None   Social History Narrative    Denied history of daily coffee consumption      Social Determinants of Health     Financial Resource Strain: Not on file   Food Insecurity: Not on file   Transportation Needs: Not on file   Physical Activity: Not on file   Stress: Not on file   Social Connections: Not on file   Intimate Partner Violence: Not on file   Housing Stability: Not on file        FAMILY HISTORY:  Family History   Problem Relation Age of Onset    No Known Problems Mother     Breast cancer Maternal Grandmother     Breast cancer Maternal Aunt            Objective    PHYSICAL EXAMINATION:   Blood pressure 120/74, pulse 72, temperature 98 °F (36 7 °C), temperature source Temporal, resp  rate 16, height 5' 7" (1 702 m), weight 69 9 kg (154 lb), SpO2 98 %  Pain Score: 0-No pain     ECOG Performance Status    Flowsheet Row Most Recent Value   ECOG Performance Status 0 - Fully active, able to carry on all pre-disease performance without restriction             Physical Exam  Constitutional:       General: She is not in acute distress  Appearance: Normal appearance  She is not toxic-appearing  HENT:      Mouth/Throat:      Mouth: Mucous membranes are moist       Pharynx: Oropharynx is clear  Eyes:      General: No scleral icterus  Cardiovascular:      Rate and Rhythm: Normal rate and regular rhythm  Pulses: Normal pulses  Heart sounds: No murmur heard  No friction rub  No gallop  Pulmonary:      Effort: Pulmonary effort is normal  No respiratory distress  Breath sounds: Normal breath sounds  No wheezing or rales  Chest:   Breasts:      Right: No axillary adenopathy or supraclavicular adenopathy  Left: No axillary adenopathy or supraclavicular adenopathy  Abdominal:      General: There is no distension  Palpations: There is no mass  Tenderness: There is no abdominal tenderness  There is no rebound  Musculoskeletal:         General: No swelling or tenderness  Right lower leg: No edema  Left lower leg: No edema  Lymphadenopathy:      Head:      Right side of head: No submandibular, preauricular or posterior auricular adenopathy  Left side of head: No submandibular, preauricular or posterior auricular adenopathy        Cervical: No cervical adenopathy  Right cervical: No superficial or posterior cervical adenopathy  Left cervical: No superficial or posterior cervical adenopathy  Upper Body:      Right upper body: No supraclavicular or axillary adenopathy  Left upper body: No supraclavicular or axillary adenopathy  Skin:     Findings: No rash  Comments: Well healed surgical scar  No evidence of recurrence at primary site  Neurological:      General: No focal deficit present  Mental Status: She is alert and oriented to person, place, and time  Psychiatric:         Mood and Affect: Mood normal          Behavior: Behavior normal          Thought Content: Thought content normal          Judgment: Judgment normal          I reviewed lab data in the chart      WBC   Date Value Ref Range Status   07/18/2022 8 44 4 31 - 10 16 Thousand/uL Final   06/14/2022 7 57 4 31 - 10 16 Thousand/uL Final   05/24/2022 5 05 4 31 - 10 16 Thousand/uL Final     Hemoglobin   Date Value Ref Range Status   07/18/2022 13 7 11 5 - 15 4 g/dL Final   06/14/2022 13 6 11 5 - 15 4 g/dL Final   05/24/2022 14 0 11 5 - 15 4 g/dL Final     Platelets   Date Value Ref Range Status   07/18/2022 273 149 - 390 Thousands/uL Final   06/14/2022 275 149 - 390 Thousands/uL Final   05/24/2022 275 149 - 390 Thousands/uL Final     MCV   Date Value Ref Range Status   07/18/2022 88 82 - 98 fL Final   06/14/2022 90 82 - 98 fL Final   05/24/2022 90 82 - 98 fL Final      Potassium   Date Value Ref Range Status   07/18/2022 5 0 3 5 - 5 3 mmol/L Final   06/14/2022 4 5 3 5 - 5 3 mmol/L Final   05/24/2022 5 0 3 5 - 5 3 mmol/L Final     Chloride   Date Value Ref Range Status   07/18/2022 105 96 - 108 mmol/L Final   06/14/2022 110 (H) 100 - 108 mmol/L Final   05/24/2022 111 (H) 100 - 108 mmol/L Final     CO2   Date Value Ref Range Status   07/18/2022 29 21 - 32 mmol/L Final   06/14/2022 27 21 - 32 mmol/L Final   05/24/2022 30 21 - 32 mmol/L Final     BUN   Date Value Ref Range Status   07/18/2022 11 5 - 25 mg/dL Final   06/14/2022 11 5 - 25 mg/dL Final   05/24/2022 11 5 - 25 mg/dL Final     Creatinine   Date Value Ref Range Status   07/18/2022 0 75 0 60 - 1 30 mg/dL Final     Comment:     Standardized to IDMS reference method   06/14/2022 0 82 0 60 - 1 30 mg/dL Final     Comment:     Standardized to IDMS reference method   05/24/2022 0 83 0 60 - 1 30 mg/dL Final     Comment:     Standardized to IDMS reference method     Glucose   Date Value Ref Range Status   07/18/2022 82 65 - 140 mg/dL Final     Comment:     If the patient is fasting, the ADA then defines impaired fasting glucose as > 100 mg/dL and diabetes as > or equal to 123 mg/dL  Specimen collection should occur prior to Sulfasalazine administration due to the potential for falsely depressed results  Specimen collection should occur prior to Sulfapyridine administration due to the potential for falsely elevated results  06/14/2022 91 65 - 140 mg/dL Final     Comment:     If the patient is fasting, the ADA then defines impaired fasting glucose as > 100 mg/dL and diabetes as > or equal to 123 mg/dL  Specimen collection should occur prior to Sulfasalazine administration due to the potential for falsely depressed results  Specimen collection should occur prior to Sulfapyridine administration due to the potential for falsely elevated results  02/02/2022 122 65 - 140 mg/dL Final     Comment:     If the patient is fasting, the ADA then defines impaired fasting glucose as > 100 mg/dL and diabetes as > or equal to 123 mg/dL  Specimen collection should occur prior to Sulfasalazine administration due to the potential for falsely depressed results  Specimen collection should occur prior to Sulfapyridine administration due to the potential for falsely elevated results       Calcium   Date Value Ref Range Status   07/18/2022 9 0 8 3 - 10 1 mg/dL Final   06/14/2022 9 0 8 3 - 10 1 mg/dL Final   05/24/2022 9 0 8 3 - 10 1 mg/dL Final Albumin   Date Value Ref Range Status   07/18/2022 3 7 3 5 - 5 0 g/dL Final   06/14/2022 3 6 3 5 - 5 0 g/dL Final   05/24/2022 3 5 3 5 - 5 0 g/dL Final     Total Bilirubin   Date Value Ref Range Status   07/18/2022 1 32 (H) 0 20 - 1 00 mg/dL Final     Comment:     Use of this assay is not recommended for patients undergoing treatment with eltrombopag due to the potential for falsely elevated results  06/14/2022 1 73 (H) 0 20 - 1 00 mg/dL Final     Comment:     Use of this assay is not recommended for patients undergoing treatment with eltrombopag due to the potential for falsely elevated results  05/24/2022 1 23 (H) 0 20 - 1 00 mg/dL Final     Comment:     Use of this assay is not recommended for patients undergoing treatment with eltrombopag due to the potential for falsely elevated results  Alkaline Phosphatase   Date Value Ref Range Status   07/18/2022 66 46 - 116 U/L Final   06/14/2022 66 46 - 116 U/L Final   05/24/2022 65 46 - 116 U/L Final     AST   Date Value Ref Range Status   07/18/2022 14 5 - 45 U/L Final     Comment:     Specimen collection should occur prior to Sulfasalazine administration due to the potential for falsely depressed results  06/14/2022 17 5 - 45 U/L Final     Comment:     Specimen collection should occur prior to Sulfasalazine administration due to the potential for falsely depressed results  05/24/2022 19 5 - 45 U/L Final     Comment:     Specimen collection should occur prior to Sulfasalazine administration due to the potential for falsely depressed results  ALT   Date Value Ref Range Status   07/18/2022 15 12 - 78 U/L Final     Comment:     Specimen collection should occur prior to Sulfasalazine administration due to the potential for falsely depressed results  06/14/2022 17 12 - 78 U/L Final     Comment:     Specimen collection should occur prior to Sulfasalazine and/or Sulfapyridine administration due to the potential for falsely depressed results  05/24/2022 21 12 - 78 U/L Final     Comment:     Specimen collection should occur prior to Sulfasalazine and/or Sulfapyridine administration due to the potential for falsely depressed results  LD   Date Value Ref Range Status   07/18/2022 155 81 - 234 U/L Final   06/14/2022 206 81 - 234 U/L Final   05/24/2022 286 (H) 81 - 234 U/L Final     No results found for: TSH  No results found for: [id number]   Free T4   Date Value Ref Range Status   07/18/2022 1 23 0 76 - 1 46 ng/dL Final     Comment:     Specimen collection should occur prior to Sulfasalazine administration due to the potential for falsely elevated results  06/14/2022 1 12 0 76 - 1 46 ng/dL Final     Comment:     Specimen collection should occur prior to Sulfasalazine administration due to the potential for falsely elevated results  05/24/2022 1 23 0 76 - 1 46 ng/dL Final     Comment:     Specimen collection should occur prior to Sulfasalazine administration due to the potential for falsely elevated results  RECENT IMAGING:  No results found  Assessment/Plan  Ms Rivera is a 36 yr female with stage II B melanoma who received treatment with adjuvant pembrolizumab who is now pregnant here for follow-up  1  Malignant melanoma of shoulder, right ([address] )  She tolerated treatment with adjuvant pembrolizumab  Labs reviewed  She is newly pregnant approximately 4-6 weeks in following with endocrinology and MFM  She will also follow with her OB as well  We discussed that there is just not information regarding pregnancy in a patient on pembrolizumab  We will continue to monitor expectantly  She knows to call with any issues or concerns  We will hold any additional treatment with immunotherapy/pembrolizumab  We will continue to monitor for side effects as side effects can occur up to 1 year following treatment  We will monitor her with close surveillance while she is pregnant and following giving birth as well    She is due for surveillance scans in October and we will make these MRIs as she should not get CT scans  She knows to call with any issues or concerns prior to her next visit  All orders placed for imaging and blood work and scripts provided  - MRI soft tissue neck wo and w contrast; Future  - MRI chest wall wo and w contrast; Future  - MRI abdomen w wo contrast; Future  - MRI pelvis w wo contrast; Future  - CBC and differential; Future  - Comprehensive metabolic panel; Future  - LD,Blood; Future  - T3, free; Future  - T4, free; Future  - TSH, 3rd generation; Future    2  Less than 8 weeks gestation of pregnancy  3  Cancer complicating pregnancy in first trimester  Patient found to be pregnant approximately 4 weeks after receiving her 6th cycle of pembrolizumab  We will discontinue treatment as there is no data for outcomes with immunotherapy and pregnancy  I did speak with [employer] and was unable to find any information regarding outcomes of pregnancies in patients who were treated with immunotherapy  There is 1 case were patient was treated with pembrolizumab at 20 weeks gestation through 28 weeks gestation and then having the baby delivered early, but this does not address the concerns of having been on immunotherapy at approximately 4 weeks gestation  We will monitor her closely  I have spoken with Dr Ej Bourgeois from and them as well  She is followed closely with [facility] as well  We will adjust our scanning to MRIs given need for surveillance imaging with an increased risk of melanoma recurrence, but her inability to get CT scans     - MRI soft tissue neck wo and w contrast; Future  - MRI chest wall wo and w contrast; Future  - MRI abdomen w wo contrast; Future  - MRI pelvis w wo contrast; Future  - CBC and differential; Future  - Comprehensive metabolic panel; Future  - LD,Blood; Future  - T3, free; Future  - T4, free; Future  - TSH, 3rd generation; Future      4   High risk medications (not anticoagulants) long-term use  5  Immunotherapy  6  Hypothyroidism, unspecified type  She was on treatment with immunotherapy and we will continue to monitor for side effects and lab abnormalities  We will monitor labs to ensure safety as side effects can occur up to a year after treatment  She knows to watch for signs and symptoms for additional immune mediated side effects  She is following closely with Endocrinology given her hypothyroidism and pregnancy  Needs close follow-up and adjustment of levothyroxine due to increased need during pregnancy  She knows to call with any issues or concerns prior to her next visit  She is due for follow-up in October with imaging  All orders placed in script provided for imaging and blood work      Will MD Yara, PhD

## 2022-08-19 ENCOUNTER — APPOINTMENT (OUTPATIENT)
Dept: LAB | Facility: MEDICAL CENTER | Age: 32
End: 2022-08-19
Payer: COMMERCIAL

## 2022-08-19 DIAGNOSIS — E03.2 HYPOTHYROIDISM DUE TO MEDICATION: ICD-10-CM

## 2022-08-19 LAB
T4 FREE SERPL-MCNC: 1.1 NG/DL (ref 0.76–1.46)
TSH SERPL DL<=0.05 MIU/L-ACNC: 5.44 UIU/ML (ref 0.45–4.5)

## 2022-08-19 PROCEDURE — 84443 ASSAY THYROID STIM HORMONE: CPT

## 2022-08-19 PROCEDURE — 36415 COLL VENOUS BLD VENIPUNCTURE: CPT

## 2022-08-19 PROCEDURE — 84439 ASSAY OF FREE THYROXINE: CPT

## 2022-08-23 ENCOUNTER — TELEPHONE (OUTPATIENT)
Dept: ENDOCRINOLOGY | Facility: CLINIC | Age: 32
End: 2022-08-23

## 2022-08-23 DIAGNOSIS — E03.2 HYPOTHYROIDISM DUE TO MEDICATION: Primary | ICD-10-CM

## 2022-08-23 RX ORDER — LEVOTHYROXINE SODIUM 137 UG/1
CAPSULE ORAL
Qty: 30 CAPSULE | Refills: 2 | Status: SHIPPED | OUTPATIENT
Start: 2022-08-23 | End: 2022-10-17

## 2022-08-23 NOTE — TELEPHONE ENCOUNTER
Called the patient and discussed about increasing levothyroxine to 1 tab once daily from [facility] and 1 5 tabs on Sunday, repeat TSH and free t4 in 4 weeks, orders placed and rx sent to the pharmacy

## 2022-08-24 NOTE — PROGRESS NOTES
Please refer to the [facility] ultrasound report in Ob Procedures for additional information regarding today's visit

## 2022-08-25 ENCOUNTER — ULTRASOUND (OUTPATIENT)
Dept: PERINATAL CARE | Facility: OTHER | Age: 32
End: 2022-08-25
Payer: COMMERCIAL

## 2022-08-25 VITALS
WEIGHT: 158.6 LBS | HEIGHT: 67 IN | SYSTOLIC BLOOD PRESSURE: 118 MMHG | BODY MASS INDEX: 24.89 KG/M2 | HEART RATE: 71 BPM | DIASTOLIC BLOOD PRESSURE: 80 MMHG

## 2022-08-25 DIAGNOSIS — Z36.89 ENCOUNTER TO ESTABLISH GESTATIONAL AGE USING ULTRASOUND: ICD-10-CM

## 2022-08-25 DIAGNOSIS — O9A.111: Primary | ICD-10-CM

## 2022-08-25 DIAGNOSIS — Z3A.08 8 WEEKS GESTATION OF PREGNANCY: ICD-10-CM

## 2022-08-25 DIAGNOSIS — O20.9 FIRST TRIMESTER BLEEDING: ICD-10-CM

## 2022-08-25 PROCEDURE — 76801 OB US < 14 WKS SINGLE FETUS: CPT | Performed by: OBSTETRICS & GYNECOLOGY

## 2022-08-25 RX ORDER — OMEGA-3S/DHA/EPA/FISH OIL/D3 300MG-1000
400 CAPSULE ORAL DAILY
COMMUNITY

## 2022-08-25 NOTE — LETTER
August 25, 2022     Kacy Austin MD  [address] [address]    Patient: Brannon Kenney   YOB: 1990   Date of Visit: 8/25/2022       Dear   [facility]: Thank you for referring Anali Rivaser to me for evaluation  Below are my notes for this consultation  If you have questions, please do not hesitate to call me  I look forward to following your patient along with you  Sincerely,        Jethro Mac MD        CC: No Recipients  Jethro Mac MD  8/24/2022  6:51 PM  Sign when Signing Visit  Please refer to the [facility] ultrasound report in Ob Procedures for additional information regarding today's visit

## 2022-09-15 ENCOUNTER — APPOINTMENT (OUTPATIENT)
Dept: LAB | Facility: MEDICAL CENTER | Age: 32
End: 2022-09-15
Payer: COMMERCIAL

## 2022-09-15 DIAGNOSIS — E03.2 HYPOTHYROIDISM DUE TO MEDICATION: ICD-10-CM

## 2022-09-15 LAB
T4 FREE SERPL-MCNC: 1.43 NG/DL (ref 0.76–1.46)
TSH SERPL DL<=0.05 MIU/L-ACNC: 0.34 UIU/ML (ref 0.45–4.5)

## 2022-09-15 PROCEDURE — 84443 ASSAY THYROID STIM HORMONE: CPT

## 2022-09-15 PROCEDURE — 84439 ASSAY OF FREE THYROXINE: CPT

## 2022-09-15 PROCEDURE — 36415 COLL VENOUS BLD VENIPUNCTURE: CPT

## 2022-09-19 ENCOUNTER — TELEPHONE (OUTPATIENT)
Dept: OTHER | Facility: HOSPITAL | Age: 32
End: 2022-09-19

## 2022-09-19 DIAGNOSIS — Z3A.13 13 WEEKS GESTATION OF PREGNANCY: ICD-10-CM

## 2022-09-19 DIAGNOSIS — E03.2 HYPOTHYROIDISM DUE TO MEDICATION: Primary | ICD-10-CM

## 2022-09-19 NOTE — TELEPHONE ENCOUNTER
Called the patient regarding her blood work results of TSH of 0 342 and free t4 of 1 43- at goal for her pregnancy (12 weeks)  Recommended the patient to c/w same dose (levothyroxine 137 mcg 1 tab from mon-saturday and 1 5tab on sunday)  Advised the patient to repeat TSH and free t4 in 4 weeks      Orders placed for repeat blood work and discussed the plan w/ attending and patient

## 2022-09-22 ENCOUNTER — ROUTINE PRENATAL (OUTPATIENT)
Dept: PERINATAL CARE | Facility: OTHER | Age: 32
End: 2022-09-22
Payer: COMMERCIAL

## 2022-09-22 VITALS
BODY MASS INDEX: 23.76 KG/M2 | HEIGHT: 67 IN | HEART RATE: 80 BPM | DIASTOLIC BLOOD PRESSURE: 76 MMHG | SYSTOLIC BLOOD PRESSURE: 115 MMHG | WEIGHT: 151.4 LBS

## 2022-09-22 DIAGNOSIS — Z36.82 ENCOUNTER FOR (NT) NUCHAL TRANSLUCENCY SCAN: Primary | ICD-10-CM

## 2022-09-22 DIAGNOSIS — Z3A.12 12 WEEKS GESTATION OF PREGNANCY: ICD-10-CM

## 2022-09-22 PROCEDURE — 76813 OB US NUCHAL MEAS 1 GEST: CPT | Performed by: STUDENT IN AN ORGANIZED HEALTH CARE EDUCATION/TRAINING PROGRAM

## 2022-09-22 PROCEDURE — 99214 OFFICE O/P EST MOD 30 MIN: CPT | Performed by: STUDENT IN AN ORGANIZED HEALTH CARE EDUCATION/TRAINING PROGRAM

## 2022-09-22 NOTE — LETTER
September 22, 2022     Mary Ogden, [address] [id number]    Patient: Barbee Meckel   YOB: 1990   Date of Visit: 9/22/2022       Dear Dr David Bear: Thank you for referring Leeta Boast to me for evaluation for nuchal translucency  The report for this consultation has been faxed to your office  Please let me know if you do not receive it  If you have questions, please do not hesitate to call me  I look forward to following your patient along with you           Sincerely,        Collette Burgess MD        CC: No Recipients

## 2022-09-22 NOTE — PROGRESS NOTES
[address]: Ms Mickey Parsons was seen today for nuchal translucency ultrasound  See ultrasound report under "OB Procedures" tab  The time spent on this established patient on the encounter date included 5 minutes previsit service time reviewing records and precharting, 5 minutes face-to-face service time counseling regarding results and coordinating care, and  5 minutes charting, totalling 15 minutes  Please don't hesitate to contact our office with any concerns or questions    -Jessica Sharpe MD

## 2022-09-22 NOTE — LETTER
September 22, 2022     Rayna Greco MD  [address] [facility]    Patient: Maksim White   YOB: 1990   Date of Visit: 9/22/2022       Dear Dr Emily Denton Recipients: Thank you for referring Chela Snider to me for evaluation  Below are my notes for this consultation  If you have questions, please do not hesitate to call me  I look forward to following your patient along with you  Sincerely,        Tamera Gunn MD        CC: No Recipients  Tamera Gunn MD  9/22/2022  9:22 AM  Sign when Signing Visit  [address]: Ms Eller Nyhan was seen today for nuchal translucency ultrasound  See ultrasound report under "OB Procedures" tab  The time spent on this established patient on the encounter date included 5 minutes previsit service time reviewing records and precharting, 5 minutes face-to-face service time counseling regarding results and coordinating care, and  5 minutes charting, totalling 15 minutes  Please don't hesitate to contact our office with any concerns or questions    -Tamera Gunn MD

## 2022-09-22 NOTE — LETTER
September 22, 2022       No Recipients    Patient: Jorge Wong   YOB: 1990   Date of Visit: 9/22/2022       Dear Dr Jonathan Rm Recipients: Thank you for referring Toribio Jasso to me for evaluation  Below are my notes for this consultation  If you have questions, please do not hesitate to call me  I look forward to following your patient along with you  Sincerely,        Anna Walker MD        CC:   No Recipients  Anna Walker MD  9/22/2022  9:22 AM  Sign when Signing Visit  [address]: Ms Marco Romero was seen today for nuchal translucency ultrasound  See ultrasound report under "OB Procedures" tab  The time spent on this established patient on the encounter date included 5 minutes previsit service time reviewing records and precharting, 5 minutes face-to-face service time counseling regarding results and coordinating care, and  5 minutes charting, totalling 15 minutes  Please don't hesitate to contact our office with any concerns or questions    -Anna Walker MD

## 2022-10-07 ENCOUNTER — HOSPITAL ENCOUNTER (OUTPATIENT)
Dept: RADIOLOGY | Facility: HOSPITAL | Age: 32
Discharge: HOME | End: 2022-10-07
Attending: INTERNAL MEDICINE

## 2022-10-07 ENCOUNTER — TELEPHONE (OUTPATIENT)
Dept: PERINATAL CARE | Facility: OTHER | Age: 32
End: 2022-10-07

## 2022-10-07 DIAGNOSIS — C43.61 MALIGNANT MELANOMA OF SHOULDER, RIGHT (HCC): ICD-10-CM

## 2022-10-07 DIAGNOSIS — Z3A.01 LESS THAN 8 WEEKS GESTATION OF PREGNANCY: ICD-10-CM

## 2022-10-07 NOTE — TELEPHONE ENCOUNTER
Spoke with patient and confirmed her MFM appointment had to be rescheduled  Patient verbalized understanding of new time, date and location of appointment - 10/18/22  9:00  YOGESH  Patient denies further questions

## 2022-10-10 DIAGNOSIS — C43.61 MALIGNANT MELANOMA OF SHOULDER, RIGHT (HCC): Primary | ICD-10-CM

## 2022-10-10 DIAGNOSIS — Z3A.01 LESS THAN 8 WEEKS GESTATION OF PREGNANCY: ICD-10-CM

## 2022-10-14 ENCOUNTER — APPOINTMENT (OUTPATIENT)
Dept: LAB | Facility: MEDICAL CENTER | Age: 32
End: 2022-10-14
Payer: COMMERCIAL

## 2022-10-14 ENCOUNTER — TELEPHONE (OUTPATIENT)
Dept: HEMATOLOGY ONCOLOGY | Facility: CLINIC | Age: 32
End: 2022-10-14

## 2022-10-14 DIAGNOSIS — Z3A.01 LESS THAN 8 WEEKS GESTATION OF PREGNANCY: ICD-10-CM

## 2022-10-14 DIAGNOSIS — C43.61 MALIGNANT MELANOMA OF SHOULDER, RIGHT (HCC): ICD-10-CM

## 2022-10-14 LAB
ALBUMIN SERPL BCP-MCNC: 2.8 G/DL (ref 3.5–5)
ALP SERPL-CCNC: 56 U/L (ref 46–116)
ALT SERPL W P-5'-P-CCNC: 13 U/L (ref 12–78)
ANION GAP SERPL CALCULATED.3IONS-SCNC: 7 MMOL/L (ref 4–13)
AST SERPL W P-5'-P-CCNC: 9 U/L (ref 5–45)
BASOPHILS # BLD AUTO: 0.03 THOUSANDS/ΜL (ref 0–0.1)
BASOPHILS NFR BLD AUTO: 0 % (ref 0–1)
BILIRUB SERPL-MCNC: 1.18 MG/DL (ref 0.2–1)
BUN SERPL-MCNC: 9 MG/DL (ref 5–25)
CALCIUM ALBUM COR SERPL-MCNC: 9.8 MG/DL (ref 8.3–10.1)
CALCIUM SERPL-MCNC: 8.8 MG/DL (ref 8.3–10.1)
CHLORIDE SERPL-SCNC: 109 MMOL/L (ref 96–108)
CO2 SERPL-SCNC: 23 MMOL/L (ref 21–32)
CREAT SERPL-MCNC: 0.57 MG/DL (ref 0.6–1.3)
EOSINOPHIL # BLD AUTO: 0.12 THOUSAND/ΜL (ref 0–0.61)
EOSINOPHIL NFR BLD AUTO: 2 % (ref 0–6)
ERYTHROCYTE [DISTWIDTH] IN BLOOD BY AUTOMATED COUNT: 12.6 % (ref 11.6–15.1)
GFR SERPL CREATININE-BSD FRML MDRD: 125 ML/MIN/1.73SQ M
GLUCOSE P FAST SERPL-MCNC: 76 MG/DL (ref 65–99)
HCT VFR BLD AUTO: 35 % (ref 34.8–46.1)
HGB BLD-MCNC: 12.2 G/DL (ref 11.5–15.4)
IMM GRANULOCYTES # BLD AUTO: 0.02 THOUSAND/UL (ref 0–0.2)
IMM GRANULOCYTES NFR BLD AUTO: 0 % (ref 0–2)
LDH SERPL-CCNC: 179 U/L (ref 81–234)
LYMPHOCYTES # BLD AUTO: 1.88 THOUSANDS/ΜL (ref 0.6–4.47)
LYMPHOCYTES NFR BLD AUTO: 25 % (ref 14–44)
MCH RBC QN AUTO: 30.6 PG (ref 26.8–34.3)
MCHC RBC AUTO-ENTMCNC: 34.9 G/DL (ref 31.4–37.4)
MCV RBC AUTO: 88 FL (ref 82–98)
MONOCYTES # BLD AUTO: 0.47 THOUSAND/ΜL (ref 0.17–1.22)
MONOCYTES NFR BLD AUTO: 6 % (ref 4–12)
NEUTROPHILS # BLD AUTO: 5.16 THOUSANDS/ΜL (ref 1.85–7.62)
NEUTS SEG NFR BLD AUTO: 67 % (ref 43–75)
NRBC BLD AUTO-RTO: 0 /100 WBCS
PLATELET # BLD AUTO: 254 THOUSANDS/UL (ref 149–390)
PMV BLD AUTO: 9.3 FL (ref 8.9–12.7)
POTASSIUM SERPL-SCNC: 4.3 MMOL/L (ref 3.5–5.3)
PROT SERPL-MCNC: 6.3 G/DL (ref 6.4–8.4)
RBC # BLD AUTO: 3.99 MILLION/UL (ref 3.81–5.12)
SODIUM SERPL-SCNC: 139 MMOL/L (ref 135–147)
T3FREE SERPL-MCNC: 2.46 PG/ML (ref 2.3–4.2)
T4 FREE SERPL-MCNC: 1.44 NG/DL (ref 0.76–1.46)
TSH SERPL DL<=0.05 MIU/L-ACNC: 0.47 UIU/ML (ref 0.45–4.5)
WBC # BLD AUTO: 7.68 THOUSAND/UL (ref 4.31–10.16)

## 2022-10-14 PROCEDURE — 83615 LACTATE (LD) (LDH) ENZYME: CPT

## 2022-10-14 PROCEDURE — 36415 COLL VENOUS BLD VENIPUNCTURE: CPT

## 2022-10-14 PROCEDURE — 85025 COMPLETE CBC W/AUTO DIFF WBC: CPT

## 2022-10-14 PROCEDURE — 84481 FREE ASSAY (FT-3): CPT

## 2022-10-14 PROCEDURE — 80053 COMPREHEN METABOLIC PANEL: CPT

## 2022-10-14 PROCEDURE — 84443 ASSAY THYROID STIM HORMONE: CPT

## 2022-10-14 PROCEDURE — 84439 ASSAY OF FREE THYROXINE: CPT

## 2022-10-14 NOTE — TELEPHONE ENCOUNTER
Called and spoke with patient  She is aware MRI neck is still under insurance review  MRI abdomen and MRI pelvis are approved for 10/16  Patient would like to wait to have all scans at once  MRI's cancelled  Patient aware I will call back once MRI neck has been approved to reschedule  She is aware CT chest has been ordered as well  Patient hesitant to get CT as she is pregnant  She will look into this and let me know if she would like to proceed

## 2022-10-16 DIAGNOSIS — E03.2 HYPOTHYROIDISM DUE TO MEDICATION: ICD-10-CM

## 2022-10-17 RX ORDER — LEVOTHYROXINE SODIUM 137 UG/1
TABLET ORAL
Qty: 90 TABLET | Refills: 1 | Status: SHIPPED | OUTPATIENT
Start: 2022-10-17

## 2022-10-18 ENCOUNTER — ROUTINE PRENATAL (OUTPATIENT)
Dept: PERINATAL CARE | Facility: OTHER | Age: 32
End: 2022-10-18
Payer: COMMERCIAL

## 2022-10-18 VITALS
HEIGHT: 67 IN | DIASTOLIC BLOOD PRESSURE: 76 MMHG | SYSTOLIC BLOOD PRESSURE: 122 MMHG | BODY MASS INDEX: 24.48 KG/M2 | WEIGHT: 156 LBS | HEART RATE: 75 BPM

## 2022-10-18 DIAGNOSIS — Z3A.16 16 WEEKS GESTATION OF PREGNANCY: ICD-10-CM

## 2022-10-18 DIAGNOSIS — O35.9XX0 TERATOGEN EXPOSURE IN CURRENT PREGNANCY, SINGLE OR UNSPECIFIED FETUS: ICD-10-CM

## 2022-10-18 DIAGNOSIS — O9A.112 CANCER COMPLICATING PREGNANCY IN SECOND TRIMESTER: Primary | ICD-10-CM

## 2022-10-18 PROCEDURE — 76811 OB US DETAILED SNGL FETUS: CPT | Performed by: OBSTETRICS & GYNECOLOGY

## 2022-10-18 PROCEDURE — 99214 OFFICE O/P EST MOD 30 MIN: CPT | Performed by: OBSTETRICS & GYNECOLOGY

## 2022-10-18 RX ORDER — DOCUSATE SODIUM 250 MG
250 CAPSULE ORAL DAILY
COMMUNITY

## 2022-10-18 NOTE — LETTER
October 18, 2022     Joss Hernandez MD  [address] [address]    Patient: Michelle Hill   YOB: 1990   Date of Visit: 10/18/2022       Dear Dr Belen Ramirez: Thank you for referring Lenin Baldwin to me for evaluation  Below are my notes for this consultation  If you have questions, please do not hesitate to call me  I look forward to following your patient along with you  Sincerely,        Emma Lopez MD        CC: Lolita Sharma MD  10/15/2022  7:31 PM  Sign when Signing Visit  Please refer to the [facility] ultrasound report in Ob Procedures for additional information regarding today's visit

## 2022-10-18 NOTE — LETTER
October 18, 2022     Yfn Jimenez MD  [address] [facility] [address]    Patient: Ghislaine Ramon   YOB: 1990   Date of Visit: 10/18/2022       Dear Dr Alexandria Hines: Thank you for referring Alice Krishnamurthyeber to me for evaluation  Below are my notes for this consultation  If you have questions, please do not hesitate to call me  I look forward to following your patient along with you  Sincerely,        Bhakti Martines MD        CC: No Recipients  Bhakti Martines MD  10/15/2022  7:31 PM  Sign when Signing Visit  Please refer to the [facility] ultrasound report in Ob Procedures for additional information regarding today's visit

## 2022-10-19 ENCOUNTER — TELEPHONE (OUTPATIENT)
Dept: HEMATOLOGY ONCOLOGY | Facility: CLINIC | Age: 32
End: 2022-10-19

## 2022-10-19 NOTE — TELEPHONE ENCOUNTER
Received message from oncology finance that MRIs have been approved  Called and spoke with patient  She prefers MRIs be scheduled on the weekends, preferably sundays  CT preferred on weekends as well or anytime during the week after 4pm  Patient is agreeable to scheduling CT as well  Follow up appointment scheduled next week will be rescheduled to after imaging  Patient aware our office will get this scheduled for her and call back with dates and times

## 2022-10-27 ENCOUNTER — OFFICE VISIT (OUTPATIENT)
Dept: ENDOCRINOLOGY | Facility: CLINIC | Age: 32
End: 2022-10-27
Payer: COMMERCIAL

## 2022-10-27 VITALS
HEART RATE: 68 BPM | BODY MASS INDEX: 24.67 KG/M2 | SYSTOLIC BLOOD PRESSURE: 108 MMHG | DIASTOLIC BLOOD PRESSURE: 58 MMHG | WEIGHT: 157.2 LBS | OXYGEN SATURATION: 100 % | HEIGHT: 67 IN

## 2022-10-27 DIAGNOSIS — E03.2 HYPOTHYROIDISM DUE TO MEDICATION: Primary | ICD-10-CM

## 2022-10-27 PROCEDURE — 99214 OFFICE O/P EST MOD 30 MIN: CPT | Performed by: STUDENT IN AN ORGANIZED HEALTH CARE EDUCATION/TRAINING PROGRAM

## 2022-10-27 NOTE — PROGRESS NOTES
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 of pembrolizumab, hypothyroidism secondary to pembrolizumab treatment who is seen for follow up for management of hypothyroidism in pregnancy  Plan:  Hypothyroidism in pregnancy  - hypothyroidism is secondary to immunotherapy (Pembrolizumab)  Patient is now off of pembrolizumab due to her pregnancy  - Well controlled biochemical for hypothyroidism  Constipation is likely 2/2 pregnancy  - TSH 0 466, free t4 1 44 (at goal of 0 2-3 based on her trimester)  - C/w Levothyroxine 137 mcg 1 tab from Monday- Satuday and 1 5 tab on Sunday  - f/u TSH and free t4 in 6 weeks  - follow up visit in 4 months in February 2023 to discuss about post partum care  Pt will likely go back to her pre-pregnancy dose of levothyroxine 112 mcg daily post partum    CC:   Hypothyroidism    History of Present Illness     HPI:  This is a 36 y o  23 wk pregnant female with PMH of stage 2B melanoma of R shoulder on adjuvant therapy of pembrolizumab, hypothyroidism secondary to pembrolizumab treatment who is seen for follow up for management of hypothyroidism in pregnancy  Patient was last seen in July 2022  Current regimen: Levothyroxine 137 mcg 1 tab from Monday- Satuday and 1 5 tab on Sunday, takes on empty stomach, avoids eating or drinking for 30-60 mins after taking levothyroxine and takes prenatal vitamins at bedtime  Pt reports to have constipation- takes colace daily, worse in last 3 weeks    No skin, hair, nail changes  Pt reports to have weight gain of 5-6lbs since pregnancy  No change in cold intolerance reported  Denies any sx of diarrhea, palpitations, tremulousness, anxiety, heat intolerance  [facility]   Due date for pregnancy is March 28th/31st 2022    Review of Systems   Constitutional: Negative for activity change, appetite change and fatigue  HENT: Negative for congestion and sore throat  Eyes: Negative for redness and visual disturbance  Respiratory: Negative for cough and shortness of breath  Cardiovascular: Negative for palpitations and leg swelling  Gastrointestinal: Positive for constipation  Negative for abdominal pain, diarrhea, nausea and vomiting  Endocrine: Positive for cold intolerance  Negative for heat intolerance, polydipsia, polyphagia and polyuria  Genitourinary: Negative for difficulty urinating and dysuria  Musculoskeletal: Negative for gait problem and neck pain  Skin: Negative for color change  Neurological: Negative for dizziness and syncope  Psychiatric/Behavioral: Negative for agitation and behavioral problems  All other systems reviewed and are negative  Historical Information   Past Medical History:   Diagnosis Date   • Lymphoma [facility])      Past Surgical History:   Procedure Laterality Date   • LYMPH NODE BIOPSY Right 8/18/2021    Procedure: BIOPSY LYMPH NODE SENTINEL, INTRAOPERATIVE   LYMPHATIC MAPPING, LYMPHOSCINTIGRAPHY (NUC MED INJECT AT 0730);   Surgeon: Iesha Laughlin MD;  Location: [facility];  Service: Surgical Oncology   • SKIN LESION EXCISION Right 8/18/2021    Procedure: EXCISION WIDE LESION UPPER EXTREMITY;  Surgeon: Iesha Laughlin MD;  Location: [facility];  Service: Surgical Oncology   • SPLIT THICKNESS SKIN GRAFT Right 8/18/2021    Procedure: RIGHT SHOULDER  COMPLEX CLOSURE;  Surgeon: Joi Martinez MD;  Location: [facility];  Service: Plastics   • WISDOM TOOTH EXTRACTION       Social History   Social History     Substance and Sexual Activity   Alcohol Use Not Currently   • Alcohol/week: 1 0 standard drink   • Types: 1 Cans of beer per week    Comment: rare     Social History     Substance and Sexual Activity   Drug Use No     Social History     Tobacco Use   Smoking Status Former Smoker • Packs/day: 0 25   • Years: 1 00   • Pack years: 0 25   • Types: Cigarettes   • Quit date:    • Years since quittin 8   Smokeless Tobacco Never Used   Tobacco Comment    about 2-3 years ago      Family History:   Family History   Problem Relation Age of Onset   • No Known Problems Mother    • Breast cancer Maternal Grandmother    • Breast cancer Maternal Aunt        Meds/Allergies   Current Outpatient Medications   Medication Sig Dispense Refill   • cholecalciferol (VITAMIN D3) 400 units tablet Take 400 Units by mouth daily     • clindamycin (CLEOCIN T) 1 % lotion Apply to face     • docusate sodium (COLACE) 250 MG capsule Take 250 mg by mouth daily Patient reports she is taking off brand colace (not sure of the exact name) PRN     • levothyroxine 137 mcg tablet TAKE ONE TAB BY MOUTH MONDAY-SATURDAY AND ONE AND HALF TABLET ON  90 tablet 1   • Prenatal Vit-Fe Fumarate-FA (PRENATAL VITAMIN PO) Take 1 tablet by mouth in the morning     • norethindrone (MICRONOR) 0 35 MG tablet Take 0 35 mg by mouth daily (Patient not taking: Reported on 10/18/2022)       No current facility-administered medications for this visit  Allergies   Allergen Reactions   • Strawberry Extract - Food Allergy Swelling       Objective   Vitals: Blood pressure 108/58, pulse 68, height 5' 7" (1 702 m), weight 71 3 kg (157 lb 3 2 oz), SpO2 100 %  Physical Exam  Constitutional:       Appearance: Normal appearance  HENT:      Head: Normocephalic and atraumatic  Cardiovascular:      Rate and Rhythm: Normal rate and regular rhythm  Pulses: Normal pulses  Heart sounds: Normal heart sounds  No murmur heard  No gallop  Pulmonary:      Effort: Pulmonary effort is normal       Breath sounds: Normal breath sounds  No wheezing or rales  Abdominal:      Tenderness: There is no abdominal tenderness  Musculoskeletal:         General: No swelling  Cervical back: Normal range of motion and neck supple   No tenderness  Right lower leg: No edema  Left lower leg: No edema  Lymphadenopathy:      Cervical: No cervical adenopathy  Skin:     General: Skin is warm  Neurological:      Mental Status: She is alert and oriented to person, place, and time  Mental status is at baseline  Psychiatric:         Mood and Affect: Mood normal          Behavior: Behavior normal          The history was obtained from the review of the chart, patient  Lab Results:   Lab Results   Component Value Date/Time    TSH 3RD GENERATON 0 466 10/14/2022 08:08 AM    TSH 3RD GENERATON 0 342 (L) 09/15/2022 04:36 PM    TSH 3RD GENERATON 5 440 (H) 08/19/2022 10:17 AM    Free T4 1 44 10/14/2022 08:08 AM    Free T4 1 43 09/15/2022 04:36 PM    Free T4 1 10 08/19/2022 10:17 AM       Imaging Studies:       I have personally reviewed pertinent reports  Portions of the record may have been created with voice recognition software  Occasional wrong word or "sound a like" substitutions may have occurred due to the inherent limitations of voice recognition software  Read the chart carefully and recognize, using context, where substitutions have occurred

## 2022-11-05 ENCOUNTER — HOSPITAL ENCOUNTER (OUTPATIENT)
Dept: MRI IMAGING | Facility: HOSPITAL | Age: 32
Discharge: HOME | End: 2022-11-05
Attending: INTERNAL MEDICINE

## 2022-11-05 DIAGNOSIS — C43.61 MALIGNANT MELANOMA OF SHOULDER, RIGHT (HCC): ICD-10-CM

## 2022-11-11 ENCOUNTER — TELEPHONE (OUTPATIENT)
Dept: HEMATOLOGY ONCOLOGY | Facility: CLINIC | Age: 32
End: 2022-11-11

## 2022-11-22 ENCOUNTER — TELEPHONE (OUTPATIENT)
Dept: PERINATAL CARE | Facility: CLINIC | Age: 32
End: 2022-11-22

## 2022-11-22 ENCOUNTER — TELEPHONE (OUTPATIENT)
Dept: PERINATAL CARE | Facility: OTHER | Age: 32
End: 2022-11-22

## 2022-11-22 NOTE — TELEPHONE ENCOUNTER
I spoke with Lydia Garcia by phone late this afternoon  MSAFP was ordered through [facility], with a mildly elevated results of 2 57 MoM  The estimated due date that MFM at [facility] has used was not be SALAS that was noted for MSAFP calculation  I recommended the Tamiko notify [facility] and request that her obstetrician call the lab and request a recalculation of MSAFP based upon an SALAS of March 31, 2023  In general, I reassured Lydia Garcia that, if MSAFP remains mildly elevated, the likelihood of an associated adverse outcome is low, especially in light of the normal anatomic evaluation of the spine and brain recently with MFM at [facility]  We discussed that unexplained elevations of MSAFP are associated with an increased risk for adverse outcomes including fetal growth restriction, bleeding associated with abruption, and preeclampsia  Clinical awareness would need to be maintained in this respect later in the pregnancy  She is currently scheduled for fetal echocardiography and cervical sonography on December 9 with MFM  If MSAFP remains elevated at 2 50 MoM or greater, Tamiko and I discussed that we will see her sooner than December 9 to reassess fetal anatomy, including the spine and brain

## 2022-11-22 NOTE — TELEPHONE ENCOUNTER
Pt called stating her [facility] O[facility]YN called her stating her MSAFP was positive and she should contact [facility] for guidance  Don Young, genetic counselor,  was notified and suggested pt call OB to notify them the incorrect due date was used when her MSAFP was drawn, 3/28/23  The correct due date given by [facility] is 3/31/23  The OB should call the lab to correct the due date to rerun the sample and provide new results with the correct due date  Dr Obdulio Gunter was also notified and he stated he would call patient to further explain results to patient and give any recommendations

## 2022-11-29 ENCOUNTER — ULTRASOUND (OUTPATIENT)
Dept: PERINATAL CARE | Facility: OTHER | Age: 32
End: 2022-11-29

## 2022-11-29 ENCOUNTER — APPOINTMENT (OUTPATIENT)
Dept: LAB | Facility: MEDICAL CENTER | Age: 32
End: 2022-11-29

## 2022-11-29 VITALS
BODY MASS INDEX: 25.27 KG/M2 | SYSTOLIC BLOOD PRESSURE: 120 MMHG | HEIGHT: 67 IN | DIASTOLIC BLOOD PRESSURE: 62 MMHG | HEART RATE: 99 BPM | WEIGHT: 161 LBS

## 2022-11-29 DIAGNOSIS — E03.2 HYPOTHYROIDISM DUE TO MEDICATION: ICD-10-CM

## 2022-11-29 DIAGNOSIS — Z3A.22 22 WEEKS GESTATION OF PREGNANCY: Primary | ICD-10-CM

## 2022-11-29 DIAGNOSIS — Z36.86 ENCOUNTER FOR ANTENATAL SCREENING FOR CERVICAL LENGTH: ICD-10-CM

## 2022-11-29 DIAGNOSIS — O28.0 ABNORMAL MSAFP (MATERNAL SERUM ALPHA-FETOPROTEIN), ELEVATED: ICD-10-CM

## 2022-11-29 LAB
T4 FREE SERPL-MCNC: 1.27 NG/DL (ref 0.76–1.46)
TSH SERPL DL<=0.05 MIU/L-ACNC: 0.18 UIU/ML (ref 0.45–4.5)

## 2022-11-29 NOTE — LETTER
November 29, 2022     Kanwal Johnson MD  [address] [address]    Patient: Latesha Patricia   YOB: 1990   Date of Visit: 11/29/2022       Dear Dr Arthur Parsons: Thank you for referring Lucrecia Acosta to me for evaluation  Below are my notes for this consultation  If you have questions, please do not hesitate to call me  I look forward to following your patient along with you  Sincerely,        Jorge Crump MD        CC: Sonya Jessica MD  11/28/2022  7:39 PM  Sign when Signing Visit  Please refer to the [facility] ultrasound report in Ob Procedures for additional information regarding today's visit

## 2022-11-29 NOTE — PROGRESS NOTES
Ultrasound Probe Disinfection    A transvaginal ultrasound was performed  Prior to use, disinfection was performed with High Level Disinfection Process (Trophon)  Probe serial number F3: [id number] was used        Everette Cordero  11/29/22  2:48 PM

## 2022-11-29 NOTE — PROGRESS NOTES
Please refer to the [facility] ultrasound report in Ob Procedures for additional information regarding today's visit

## 2022-12-03 ENCOUNTER — HOSPITAL ENCOUNTER (OUTPATIENT)
Dept: MRI IMAGING | Facility: HOSPITAL | Age: 32
Discharge: HOME | End: 2022-12-03
Attending: INTERNAL MEDICINE

## 2022-12-03 ENCOUNTER — HOSPITAL ENCOUNTER (OUTPATIENT)
Dept: CT IMAGING | Facility: HOSPITAL | Age: 32
Discharge: HOME | End: 2022-12-03
Attending: INTERNAL MEDICINE

## 2022-12-03 DIAGNOSIS — C43.61 MALIGNANT MELANOMA OF SHOULDER, RIGHT (HCC): ICD-10-CM

## 2022-12-06 PROBLEM — O28.0 ABNORMAL MSAFP (MATERNAL SERUM ALPHA-FETOPROTEIN), ELEVATED: Status: ACTIVE | Noted: 2022-12-06

## 2022-12-06 PROBLEM — O9A.112: Status: ACTIVE | Noted: 2022-08-07

## 2022-12-06 PROBLEM — E07.9 THYROID DISEASE AFFECTING PREGNANCY: Status: ACTIVE | Noted: 2022-12-06

## 2022-12-06 PROBLEM — O99.280 THYROID DISEASE AFFECTING PREGNANCY: Status: ACTIVE | Noted: 2022-12-06

## 2022-12-06 NOTE — PATIENT INSTRUCTIONS
Thank you for choosing us for your  care today  If you have any questions about your ultrasound or care, please do not hesitate to contact us or your primary obstetrician  Some general instructions for your pregnancy are:    Exercise: Aim for 22 minutes per day (150 minutes per week) of regular exercise  Walking is great! Nutrition: Choose healthy sources of calcium, iron, and protein  Learn about Preeclampsia: preeclampsia is a common, serious high blood pressure complication in pregnancy  A blood pressure of 337MPZW (systolic or top number) or 24FYOC (diastolic or bottom number) is not normal and needs evaluation by your doctor  Aspirin is sometimes prescribed in early pregnancy to prevent preeclampsia in women with risk factors - ask your obstetrician if you should be on this medication  If you smoke, try to reduce how many cigarettes you smoke or try to quit completely  Do not vape  Other warning signs to watch out for in pregnancy or postpartum: chest pain, obstructed breathing or shortness of breath, seizures, thoughts of hurting yourself or your baby, bleeding, a painful or swollen leg, fever, or headache (see [facility] POST-BIRTH Warning Signs campaign)  If these happen call 911  Itching is also not normal in pregnancy and if you experience this, especially over your hands and feet, potentially worse at night, notify your doctors

## 2022-12-07 ENCOUNTER — ROUTINE PRENATAL (OUTPATIENT)
Dept: PERINATAL CARE | Facility: CLINIC | Age: 32
End: 2022-12-07

## 2022-12-07 VITALS
WEIGHT: 162.2 LBS | HEIGHT: 67 IN | DIASTOLIC BLOOD PRESSURE: 52 MMHG | SYSTOLIC BLOOD PRESSURE: 108 MMHG | HEART RATE: 79 BPM | BODY MASS INDEX: 25.46 KG/M2

## 2022-12-07 DIAGNOSIS — E07.9 THYROID DISEASE AFFECTING PREGNANCY: ICD-10-CM

## 2022-12-07 DIAGNOSIS — Z3A.23 23 WEEKS GESTATION OF PREGNANCY: ICD-10-CM

## 2022-12-07 DIAGNOSIS — O9A.112 CANCER COMPLICATING PREGNANCY IN SECOND TRIMESTER: ICD-10-CM

## 2022-12-07 DIAGNOSIS — O28.0 ABNORMAL MSAFP (MATERNAL SERUM ALPHA-FETOPROTEIN), ELEVATED: ICD-10-CM

## 2022-12-07 DIAGNOSIS — O35.9XX0 TERATOGEN EXPOSURE IN CURRENT PREGNANCY, SINGLE OR UNSPECIFIED FETUS: Primary | ICD-10-CM

## 2022-12-07 DIAGNOSIS — O99.280 THYROID DISEASE AFFECTING PREGNANCY: ICD-10-CM

## 2022-12-07 NOTE — PROGRESS NOTES
Maksim White  has no complaints today at 23w5d  She reports fetal movements and does not report any vaginal bleeding or signs of labor  NIPT is reported to be normal and ordered through her ob provider at [facility]  Problem list:  1  Elevated MSAFP of 2 57 MOM  2  Hypothyroidism with a low TSH of 0 178 on 11/29/2022 and a normal free T4 of 1 27  She is on levothyroxine 137 mcg daily  3  Maternal history of a malignant melanoma of the right shoulder  She became pregnant approxi-4 weeks after her 6th  treatment with Keytruda  She reports she is not planning on resuming treatment postpartum at this time    Ultrasound findings: The ultrasound today shows a normal fetal echo  Pregnancy ultrasound has limitations and is unable to detect all forms of fetal congenital abnormalities  Follow up recommended:   1  Follow-up ultrasound for growth at 28 weeks was previously scheduled  If her next growth scan is normal then recommend a follow-up ultrasound for growth in the third trimester between 32 to 36 weeks along with a weekly nonstress test and fluid scans at 36 weeks  2  I saw her thyroid levels after she left my office  I sent her a my chart message to call her endocrinologist to see if they want to back down on her levothyroxine to normalize her TSH levels  Pre visit time reviewing her records   15 minutes  Face to face time 5 minutes  Post visit time on documentation of note, updating her problem list, adding orders and prescriptions 5 minutes  Procedures that were completed today were charged separately  The level of decision making was low level complexity      Heidy Baker MD

## 2022-12-07 NOTE — LETTER
December 7, 2022     Moira Reilly MD  [address] [address]    Patient: Kristen Smith   YOB: 1990   Date of Visit: 12/7/2022       Dear Dr Yomaira Jordan: Thank you for referring Alpha Miners to me for evaluation  Below are my notes for this consultation  If you have questions, please do not hesitate to call me  I look forward to following your patient along with you  Sincerely,        Harvey Philip MD        CC: No Recipients  Harvey Philip MD  12/7/2022  2:46 PM  Sign when Signing Visit  Kristen Smith  has no complaints today at 23w5d  She reports fetal movements and does not report any vaginal bleeding or signs of labor  NIPT is reported to be normal and ordered through her ob provider at [facility]  Problem list:  1  Elevated MSAFP of 2 57 MOM  2  Hypothyroidism with a low TSH of 0 178 on 11/29/2022 and a normal free T4 of 1 27  She is on levothyroxine 137 mcg daily  3  Maternal history of a malignant melanoma of the right shoulder  She became pregnant approxi-4 weeks after her 6th  treatment with Keytruda  She reports she is not planning on resuming treatment postpartum at this time    Ultrasound findings: The ultrasound today shows a normal fetal echo  Pregnancy ultrasound has limitations and is unable to detect all forms of fetal congenital abnormalities  Follow up recommended:   1  Follow-up ultrasound for growth at 28 weeks was previously scheduled  If her next growth scan is normal then recommend a follow-up ultrasound for growth in the third trimester between 32 to 36 weeks along with a weekly nonstress test and fluid scans at 36 weeks  2  I saw her thyroid levels after she left my office  I sent her a my chart message to call her endocrinologist to see if they want to back down on her levothyroxine to normalize her TSH levels       Pre visit time reviewing her records   15 minutes  Face to face time 5 minutes  Post visit time on documentation of note, updating her problem list, adding orders and prescriptions 5 minutes  Procedures that were completed today were charged separately  The level of decision making was low level complexity      Marquis Misha MD

## 2022-12-08 ENCOUNTER — TELEPHONE (OUTPATIENT)
Dept: ENDOCRINOLOGY | Facility: CLINIC | Age: 32
End: 2022-12-08

## 2022-12-08 DIAGNOSIS — E03.2 HYPOTHYROIDISM DUE TO MEDICATION: Primary | ICD-10-CM

## 2022-12-12 DIAGNOSIS — E03.2 HYPOTHYROIDISM DUE TO MEDICATION: ICD-10-CM

## 2022-12-12 RX ORDER — LEVOTHYROXINE SODIUM 137 UG/1
TABLET ORAL
Qty: 90 TABLET | Refills: 1 | Status: SHIPPED | OUTPATIENT
Start: 2022-12-12

## 2022-12-28 ENCOUNTER — OFFICE VISIT (OUTPATIENT)
Dept: HEMATOLOGY ONCOLOGY | Facility: CLINIC | Age: 32
End: 2022-12-28

## 2022-12-28 VITALS
HEART RATE: 77 BPM | WEIGHT: 170 LBS | TEMPERATURE: 97.7 F | OXYGEN SATURATION: 98 % | BODY MASS INDEX: 26.68 KG/M2 | RESPIRATION RATE: 18 BRPM | SYSTOLIC BLOOD PRESSURE: 110 MMHG | DIASTOLIC BLOOD PRESSURE: 64 MMHG | HEIGHT: 67 IN

## 2022-12-28 DIAGNOSIS — Z79.899 HIGH RISK MEDICATIONS (NOT ANTICOAGULANTS) LONG-TERM USE: ICD-10-CM

## 2022-12-28 DIAGNOSIS — C43.61 MALIGNANT MELANOMA OF SHOULDER, RIGHT (HCC): Primary | ICD-10-CM

## 2022-12-28 NOTE — LETTER
December 30, 2022     Lamar Koroma DO  [address] [address]    Patient: Naz Fonseca   YOB: 1990   Date of Visit: 12/28/2022       Dear Dr Breann Rivera: Thank you for referring Barbara Bradley to me for evaluation  Below are my notes for this consultation  If you have questions, please do not hesitate to call me  I look forward to following your patient along with you  Sincerely,        Александр Barreto MD        CC: MD Richa Lau MD Marinda Oas, MD Shellye Bread, MD Macario Chi, Garrett Block MD  12/30/2022  7:45 PM  Sign when Signing Visit  [address] [place] [address] 58  [phone]     Date of Visit: 12/28/2022  Name: Naz Fonseca   YOB: 1990        Subjective    VISIT DIAGNOSIS:  Diagnoses and all orders for this visit:    Malignant melanoma of shoulder, right ([email or web address] )  -     TSH, 3rd generation; Future  -     T3, free; Future  -     T4, free; Future  -     CT neck chest abdomen pelvis w contrast; Future    High risk medications (not anticoagulants) long-term use  -     CBC and differential; Future  -     Comprehensive metabolic panel; Future  -     LD,Blood; Future  -     TSH, 3rd generation; Future  -     T3, free; Future  -     T4, free;  Future        Oncology History   Malignant melanoma of shoulder, right ([email or web address] )   7/8/2021 Biopsy    Right anterior shoulder:  - Ulcerated malignant melanoma, 2 5mm    Ulceration: Yes  Mitosis: >1    NGS Testing  MS-Stable  TMB - 13Muts/Mb  CDKN2A E10  TERT promoter -124C>T       7/8/2021 Genomic Testing    DecisionDx - Class 2B     7/8/2021 -  Cancer Staged    Staging form: Melanoma of the Skin, AJCC 8th Edition  - Pathologic stage from 7/8/2021: Stage IIB (pT3b, pN0, cM0) - Signed by Александр Barreto MD on 9/20/2021  Stage prefix: Initial diagnosis       8/18/2021 Surgery    Wide excision melanoma with sentinel lymph node biopsy  No residual melanoma  LNs negative - 0/9 LNs     11/17/2021 - 6/16/2022 Chemotherapy    pembrolizumab (KEYTRUDA) IVPB, 400 mg, Intravenous, Once, 6 of 9 cycles  Administration: 400 mg (11/17/2021), 400 mg (12/29/2021), 400 mg (2/7/2022), 400 mg (3/21/2022), 400 mg (5/2/2022), 400 mg (6/16/2022)        Cancer Staging   Malignant melanoma of shoulder, right (HCC)  Staging form: Melanoma of the Skin, AJCC 8th Edition  - Pathologic stage from 7/8/2021: Stage IIB (pT3b, pN0, cM0) - Signed by Dallas Hernández MD on 9/20/2021     Treatment Details   Treatment goal Curative   Plan Name OP Pembrolizumab Q 42 Days   Status Inactive   Start Date 11/17/2021   End Date 6/16/2022   Provider Dallas Hernández MD   Chemotherapy pembrolizumab [facility]) IVPB, 400 mg, Intravenous, Once, 6 of 9 cycles  Administration: 400 mg (11/17/2021), 400 mg (12/29/2021), 400 mg (2/7/2022), 400 mg (3/21/2022), 400 mg (5/2/2022), 400 mg (6/16/2022)          HISTORY OF PRESENT ILLNESS: Sixto Lazar is a 29 y o  female  who has stage IIb melanoma who was on treatment with adjuvant pembrolizumab when she became pregnant  She is here today for follow-up and surveillance  She had developed hypothyroidism on PD1 inhibitor, and she has been followed by endocrinology during her pregnancy for TSH monitoring and adjusting of levothyroxine  She is also followed by [facility] for monitoring due to her exposure to pembrolizumab  We stopped the treatment as soon as we found out she was pregnant  She is doing well today  Denies any new, changing, or concerning skin lesions  Denies any lymphadenopathy  She continues to follow with dermatology and has an appointment tomorrow  She did undergo imaging recently I reviewed these imagings and we discussed with her that there is no evidence of melanoma recurrence or metastasis at this time        REVIEW OF SYSTEMS:  Review of Systems   Constitutional: Negative for appetite change, fatigue, fever and unexpected weight change  HENT:   Negative for lump/mass  Eyes: Negative for icterus  Respiratory: Negative for cough, shortness of breath and wheezing  Cardiovascular: Negative for leg swelling  Gastrointestinal: Negative for abdominal pain, constipation, diarrhea, nausea and vomiting  Genitourinary: Negative for difficulty urinating and hematuria  Musculoskeletal: Negative for arthralgias, gait problem and myalgias  Skin: Negative for itching and rash  No new, changing, or concerning lesions  Neurological: Negative for extremity weakness, gait problem, headaches, light-headedness and numbness  Hematological: Negative for adenopathy          MEDICATIONS:    Current Outpatient Medications:   •  clindamycin (CLEOCIN T) 1 % lotion, Apply to face, Disp: , Rfl:   •  docusate sodium (COLACE) 250 MG capsule, Take 250 mg by mouth daily Patient reports she is taking off brand colace (not sure of the exact name) PRN, Disp: , Rfl:   •  levothyroxine 137 mcg tablet, TAKE ONE TAB BY MOUTH MONDAY-SATURDAY AND ONE AND HALF TABLET ON SUNDAY, Disp: 90 tablet, Rfl: 1  •  Prenatal Vit-Fe Fumarate-FA (PRENATAL VITAMIN PO), Take 1 tablet by mouth in the morning, Disp: , Rfl:      ALLERGIES:  Allergies   Allergen Reactions   • Strawberry Extract - Food Allergy Swelling        ACTIVE PROBLEMS:  Patient Active Problem List   Diagnosis   • ADD (attention deficit disorder) without hyperactivity   • Malignant melanoma of shoulder, right (HCC)   • Family history of breast cancer   • High risk medications (not anticoagulants) long-term use   • Immunotherapy   • Hypothyroidism   • First trimester bleeding   • Cancer complicating pregnancy in second trimester   • Abnormal MSAFP (maternal serum alpha-fetoprotein), elevated   • Thyroid disease affecting pregnancy          PAST MEDICAL HISTORY:   Past Medical History:   Diagnosis Date   • Lymphoma ([facility] [address] )         PAST SURGICAL HISTORY:  Past Surgical History:   Procedure Laterality Date   • LYMPH NODE BIOPSY Right 2021    Procedure: BIOPSY LYMPH NODE SENTINEL, INTRAOPERATIVE   LYMPHATIC MAPPING, LYMPHOSCINTIGRAPHY (NUC MED INJECT AT 0730);   Surgeon: Patricia Alvarez MD;  Location: [facility];  Service: Surgical Oncology   • SKIN LESION EXCISION Right 2021    Procedure: EXCISION WIDE LESION UPPER EXTREMITY;  Surgeon: Patricia Alvarez MD;  Location[facility];  Service: Surgical Oncology   • SPLIT THICKNESS SKIN GRAFT Right 2021    Procedure: RIGHT SHOULDER  COMPLEX CLOSURE;  Surgeon: Marcelino Blizzard, MD;  Location: [facility];  Service: Plastics   • WISDOM TOOTH EXTRACTION          SOCIAL HISTORY:  Social History     Socioeconomic History   • Marital status: Single     Spouse name: None   • Number of children: None   • Years of education: None   • Highest education level: None   Occupational History   • None   Tobacco Use   • Smoking status: Former     Packs/day: 0 25     Years: 1 00     Pack years: 0 25     Types: Cigarettes     Quit date:      Years since quittin 9   • Smokeless tobacco: Never   • Tobacco comments:     about 2-3 years ago    Vaping Use   • Vaping Use: Never used   Substance and Sexual Activity   • Alcohol use: Not Currently     Alcohol/week: 1 0 standard drink     Types: 1 Cans of beer per week     Comment: rare   • Drug use: No   • Sexual activity: Yes     Partners: Male   Other Topics Concern   • None   Social History Narrative    Denied history of daily coffee consumption      Social Determinants of Health     Financial Resource Strain: Not on file   Food Insecurity: Not on file   Transportation Needs: Not on file   Physical Activity: Not on file   Stress: Not on file   Social Connections: Not on file   Intimate Partner Violence: Not on file   Housing Stability: Not on file        FAMILY HISTORY:  Family History   Problem Relation Age of Onset   • No Known Problems Mother    • Breast cancer Maternal Grandmother    • Breast cancer Maternal Aunt            Objective    PHYSICAL EXAMINATION:   Blood pressure 110/64, pulse 77, temperature 97 7 °F (36 5 °C), resp  rate 18, height 5' 7" (1 702 m), weight 77 1 kg (170 lb), SpO2 98 %  ECOG Performance Status    Flowsheet Row Most Recent Value   ECOG Performance Status 0 - Fully active, able to carry on all pre-disease performance without restriction               Physical Exam  Constitutional:       General: She is not in acute distress  Appearance: Normal appearance  She is not toxic-appearing  HENT:      Mouth/Throat:      Mouth: Mucous membranes are moist       Pharynx: Oropharynx is clear  Eyes:      General: No scleral icterus  Cardiovascular:      Rate and Rhythm: Normal rate and regular rhythm  Pulses: Normal pulses  Heart sounds: No murmur heard  No friction rub  No gallop  Pulmonary:      Effort: Pulmonary effort is normal  No respiratory distress  Breath sounds: Normal breath sounds  No wheezing or rales  Abdominal:      General: There is no distension  Palpations: There is no mass  Tenderness: There is no abdominal tenderness  There is no rebound  Musculoskeletal:         General: No swelling or tenderness  Right lower leg: No edema  Left lower leg: No edema  Lymphadenopathy:      Head:      Right side of head: No submandibular, preauricular or posterior auricular adenopathy  Left side of head: No submandibular, preauricular or posterior auricular adenopathy  Cervical: No cervical adenopathy  Right cervical: No superficial or posterior cervical adenopathy  Left cervical: No superficial or posterior cervical adenopathy  Upper Body:      Right upper body: No supraclavicular or axillary adenopathy  Left upper body: No supraclavicular or axillary adenopathy  Skin:     Findings: No rash  Comments: Well healed surgical scar    No evidence of recurrence at primary site  Neurological:      General: No focal deficit present  Mental Status: She is alert and oriented to person, place, and time  Psychiatric:         Mood and Affect: Mood normal          Behavior: Behavior normal          Thought Content: Thought content normal          Judgment: Judgment normal          I reviewed lab data in the chart      WBC   Date Value Ref Range Status   10/14/2022 7 68 4 31 - 10 16 Thousand/uL Final   07/18/2022 8 44 4 31 - 10 16 Thousand/uL Final   06/14/2022 7 57 4 31 - 10 16 Thousand/uL Final     Hemoglobin   Date Value Ref Range Status   10/14/2022 12 2 11 5 - 15 4 g/dL Final   07/18/2022 13 7 11 5 - 15 4 g/dL Final   06/14/2022 13 6 11 5 - 15 4 g/dL Final     Platelets   Date Value Ref Range Status   10/14/2022 254 149 - 390 Thousands/uL Final   07/18/2022 273 149 - 390 Thousands/uL Final   06/14/2022 275 149 - 390 Thousands/uL Final     MCV   Date Value Ref Range Status   10/14/2022 88 82 - 98 fL Final   07/18/2022 88 82 - 98 fL Final   06/14/2022 90 82 - 98 fL Final      Potassium   Date Value Ref Range Status   10/14/2022 4 3 3 5 - 5 3 mmol/L Final   07/18/2022 5 0 3 5 - 5 3 mmol/L Final   06/14/2022 4 5 3 5 - 5 3 mmol/L Final     Chloride   Date Value Ref Range Status   10/14/2022 109 (H) 96 - 108 mmol/L Final   07/18/2022 105 96 - 108 mmol/L Final   06/14/2022 110 (H) 100 - 108 mmol/L Final     CO2   Date Value Ref Range Status   10/14/2022 23 21 - 32 mmol/L Final   07/18/2022 29 21 - 32 mmol/L Final   06/14/2022 27 21 - 32 mmol/L Final     BUN   Date Value Ref Range Status   10/14/2022 9 5 - 25 mg/dL Final   07/18/2022 11 5 - 25 mg/dL Final   06/14/2022 11 5 - 25 mg/dL Final     Creatinine   Date Value Ref Range Status   10/14/2022 0 57 (L) 0 60 - 1 30 mg/dL Final     Comment:     Standardized to IDMS reference method   07/18/2022 0 75 0 60 - 1 30 mg/dL Final     Comment:     Standardized to IDMS reference method   06/14/2022 0 82 0 60 - 1 30 mg/dL Final Comment:     Standardized to IDMS reference method     Glucose   Date Value Ref Range Status   07/18/2022 82 65 - 140 mg/dL Final     Comment:     If the patient is fasting, the ADA then defines impaired fasting glucose as > 100 mg/dL and diabetes as > or equal to 123 mg/dL  Specimen collection should occur prior to Sulfasalazine administration due to the potential for falsely depressed results  Specimen collection should occur prior to Sulfapyridine administration due to the potential for falsely elevated results  06/14/2022 91 65 - 140 mg/dL Final     Comment:     If the patient is fasting, the ADA then defines impaired fasting glucose as > 100 mg/dL and diabetes as > or equal to 123 mg/dL  Specimen collection should occur prior to Sulfasalazine administration due to the potential for falsely depressed results  Specimen collection should occur prior to Sulfapyridine administration due to the potential for falsely elevated results  02/02/2022 122 65 - 140 mg/dL Final     Comment:     If the patient is fasting, the ADA then defines impaired fasting glucose as > 100 mg/dL and diabetes as > or equal to 123 mg/dL  Specimen collection should occur prior to Sulfasalazine administration due to the potential for falsely depressed results  Specimen collection should occur prior to Sulfapyridine administration due to the potential for falsely elevated results       Calcium   Date Value Ref Range Status   10/14/2022 8 8 8 3 - 10 1 mg/dL Final   07/18/2022 9 0 8 3 - 10 1 mg/dL Final   06/14/2022 9 0 8 3 - 10 1 mg/dL Final     Albumin   Date Value Ref Range Status   10/14/2022 2 8 (L) 3 5 - 5 0 g/dL Final   07/18/2022 3 7 3 5 - 5 0 g/dL Final   06/14/2022 3 6 3 5 - 5 0 g/dL Final     Total Bilirubin   Date Value Ref Range Status   10/14/2022 1 18 (H) 0 20 - 1 00 mg/dL Final     Comment:     Use of this assay is not recommended for patients undergoing treatment with eltrombopag due to the potential for falsely elevated results  07/18/2022 1 32 (H) 0 20 - 1 00 mg/dL Final     Comment:     Use of this assay is not recommended for patients undergoing treatment with eltrombopag due to the potential for falsely elevated results  06/14/2022 1 73 (H) 0 20 - 1 00 mg/dL Final     Comment:     Use of this assay is not recommended for patients undergoing treatment with eltrombopag due to the potential for falsely elevated results  Alkaline Phosphatase   Date Value Ref Range Status   10/14/2022 56 46 - 116 U/L Final   07/18/2022 66 46 - 116 U/L Final   06/14/2022 66 46 - 116 U/L Final     AST   Date Value Ref Range Status   10/14/2022 9 5 - 45 U/L Final     Comment:     Specimen collection should occur prior to Sulfasalazine administration due to the potential for falsely depressed results  07/18/2022 14 5 - 45 U/L Final     Comment:     Specimen collection should occur prior to Sulfasalazine administration due to the potential for falsely depressed results  06/14/2022 17 5 - 45 U/L Final     Comment:     Specimen collection should occur prior to Sulfasalazine administration due to the potential for falsely depressed results  ALT   Date Value Ref Range Status   10/14/2022 13 12 - 78 U/L Final     Comment:     Specimen collection should occur prior to Sulfasalazine and/or Sulfapyridine administration due to the potential for falsely depressed results  07/18/2022 15 12 - 78 U/L Final     Comment:     Specimen collection should occur prior to Sulfasalazine administration due to the potential for falsely depressed results  06/14/2022 17 12 - 78 U/L Final     Comment:     Specimen collection should occur prior to Sulfasalazine and/or Sulfapyridine administration due to the potential for falsely depressed results         LD   Date Value Ref Range Status   10/14/2022 179 81 - 234 U/L Final   07/18/2022 155 81 - 234 U/L Final   06/14/2022 206 81 - 234 U/L Final     No results found for: TSH  No results found for: [id number] Free T4   Date Value Ref Range Status   12/29/2022 1 30 0 76 - 1 46 ng/dL Final     Comment:     Specimen collection should occur prior to Sulfasalazine administration due to the potential for falsely elevated results  11/29/2022 1 27 0 76 - 1 46 ng/dL Final     Comment:     Specimen collection should occur prior to Sulfasalazine administration due to the potential for falsely elevated results  10/14/2022 1 44 0 76 - 1 46 ng/dL Final     Comment:     Specimen collection should occur prior to Sulfasalazine administration due to the potential for falsely elevated results  RECENT IMAGING:  Procedure: CT chest wo contrast    Result Date: 12/8/2022  Narrative: CT CHEST WITHOUT IV CONTRAST INDICATION:   C43 61: Malignant melanoma of right upper limb, including shoulder  COMPARISON:  CT 4/1/2022  TECHNIQUE: CT examination of the chest was performed without intravenous contrast  Axial, sagittal, and coronal 2D reformatted images were created from the source data and submitted for interpretation  Radiation dose length product (DLP) for this visit:  186 68 mGy-cm   This examination, like all CT scans performed in the [facility], was performed utilizing techniques to minimize radiation dose exposure, including the use of iterative  reconstruction and automated exposure control  FINDINGS: LUNGS:  Lungs are clear  There is no tracheal or endobronchial lesion  PLEURA:  Unremarkable  HEART/GREAT VESSELS: Heart is unremarkable for patient's age  No thoracic aortic aneurysm  MEDIASTINUM AND SUNNY:  Unremarkable  CHEST WALL AND LOWER NECK:  Unremarkable  No adenopathy  VISUALIZED STRUCTURES IN THE UPPER ABDOMEN:  Unremarkable  OSSEOUS STRUCTURES:  No acute fracture or destructive osseous lesion       Impression: No evidence of metastatic disease in the chest  Workstation performed: [id number]     Procedure: MRI soft tissue neck wo contrast    Result Date: 12/7/2022  Narrative: MRI OF THE SOFT TISSUES OF THE NECK - WITHOUT CONTRAST INDICATION: C43 61: Malignant melanoma of right upper limb, including shoulder COMPARISON:  CT neck dated 4/1/2022 TECHNIQUE:  Multiplanar, multisequence imaging of the soft tissues of the neck was performed  IV Contrast:  Exam specifically ordered without contrast  IMAGE QUALITY:  Diagnostic  FINDINGS: VISUALIZED BRAIN PARENCHYMA:  Normal  VISUALIZED ORBITS AND PARANASAL SINUSES:  Normal visualized orbits  Mild mucosal thickening of the ethmoid air cells and maxillary sinuses  NASAL CAVITY AND NASOPHARYNX:  Normal  SUPRAHYOID NECK:  Normal oral cavity, tongue base, tonsillar fossa and epiglottis  INFRAHYOID NECK:  Aryepiglottic folds, laryngeal tissues, and piriform sinuses are normal  THYROID GLAND:   Normal  PAROTID AND SUBMANDIBULAR GLANDS:  Normal  LYMPH NODES:  No enlarged or pathologic adenopathy  VASCULAR STRUCTURES:  Grossly normal flow voids of the cervical vasculature  THORACIC INLET: Lung apices and upper mediastinum are grossly unremarkable  CERVICAL SPINE:  Normal alignment of the cervical spine  Normal marrow signal   No gross evidence of degenerative disease  Cervical cord is grossly normal in signal      Impression: Normal noncontrast imaging of the neck  No soft tissue mass or enlarged lymph nodes  Workstation performed: [id number]             Assessment/Plan  Ms Rivera is a 29 yr female with stage IIb melanoma status post adjuvant pembrolizumab and recently pregnant here for follow-up and surveillance  1  Malignant melanoma of shoulder, right ([address] )  She is stage IIb melanoma and received 6 cycles of adjuvant pembrolizumab completing her last treatment on 6/16/2022  She continues in surveillance  We discussed that her imaging is negative for melanoma recurrence or metastasis  Labs reviewed and okay  She is currently due in March 2023    Typically we monitor every 3 months with physical exams and blood work, however this would be then due approximately around the time she is due to deliver her child  Therefore we will follow up with her in 6 months  At that time she will return with full set of full body imaging for monitoring and surveillance  She will also return with blood work  All orders placed and prescription provided  We also discussed that if she should need us prior to 6 months she can call and we will get her in to see me as soon as possible  She knows to call if there is any issues or concerns       - TSH, 3rd generation; Future  - T3, free; Future  - T4, free; Future  - CT neck chest abdomen pelvis w contrast; Future    2  High risk medications (not anticoagulants) long-term use  She was on treatment with immunotherapy and we will continue to monitor for side effects and lab abnormalities  We will monitor labs with each visit as she can experience delayed onset of immune mediated side effects  She knows to watch for signs and symptoms for immune mediated side effects  Orders placed and prescription provided for blood work at her return visit  - CBC and differential; Future  - Comprehensive metabolic panel; Future  - LD,Blood; Future  - TSH, 3rd generation; Future  - T3, free; Future  - T4, free; Future        Return in about 6 months (around 6/28/2023) for Office Visit, Imaging - See orders, scans, labs       Timothy Banerjee MD, PhD

## 2022-12-29 ENCOUNTER — APPOINTMENT (OUTPATIENT)
Dept: LAB | Facility: MEDICAL CENTER | Age: 32
End: 2022-12-29

## 2022-12-29 DIAGNOSIS — E03.2 HYPOTHYROIDISM DUE TO MEDICATION: ICD-10-CM

## 2022-12-29 LAB
T4 FREE SERPL-MCNC: 1.3 NG/DL (ref 0.76–1.46)
TSH SERPL DL<=0.05 MIU/L-ACNC: 0.22 UIU/ML (ref 0.45–4.5)

## 2022-12-31 NOTE — PROGRESS NOTES
[facility] HEMATOLOGY ONCOLOGY SPECIALISTS [address]  [place] [id number] [phone]     Date of Visit: 12/28/2022  Name: Kevin Solorio   YOB: 1990        Subjective    VISIT DIAGNOSIS:  Diagnoses and all orders for this visit:    Malignant melanoma of shoulder, right ([facility] [address] )  -     TSH, 3rd generation; Future  -     T3, free; Future  -     T4, free; Future  -     CT neck chest abdomen pelvis w contrast; Future    High risk medications (not anticoagulants) long-term use  -     CBC and differential; Future  -     Comprehensive metabolic panel; Future  -     LD,Blood; Future  -     TSH, 3rd generation; Future  -     T3, free; Future  -     T4, free;  Future        Oncology History   Malignant melanoma [facility] shoulder, right ([facility] )   7/8/2021 Biopsy    Right anterior shoulder:  - Ulcerated malignant melanoma, 2 5mm    Ulceration: Yes  Mitosis: >1    NGS Testing  MS-Stable  TMB - 13Muts/Mb  CDKN2A E10  TERT promoter -124C>T       7/8/2021 Genomic Testing    DecisionDx - Class 2B     7/8/2021 -  Cancer Staged    Staging form: Melanoma of the Skin, AJCC 8th Edition  - Pathologic stage from 7/8/2021: Stage IIB (pT3b, pN0, cM0) - Signed by Bassem Wilkerson MD on 9/20/2021  Stage prefix: Initial diagnosis       8/18/2021 Surgery    Wide excision melanoma with sentinel lymph node biopsy  No residual melanoma  LNs negative - 0/9 LNs     11/17/2021 - 6/16/2022 Chemotherapy    pembrolizumab (KEYTRUDA) IVPB, 400 mg, Intravenous, Once, 6 of 9 cycles  Administration: 400 mg (11/17/2021), 400 mg (12/29/2021), 400 mg (2/7/2022), 400 mg (3/21/2022), 400 mg (5/2/2022), 400 mg (6/16/2022)        Cancer Staging   Malignant melanoma of shoulder, right (HCC)  Staging form: Melanoma of the Skin, AJCC 8th Edition  - Pathologic stage from 7/8/2021: Stage IIB (pT3b, pN0, cM0) - Signed by Bassem Wilkerson MD on 9/20/2021     Treatment Details   Treatment goal Curative   Plan Name OP Pembrolizumab Q 42 Days   Status Inactive   Start Date 11/17/2021   End Date 6/16/2022   Provider Carlos Manuel Forrester MD   Chemotherapy pembrolizumab [facility]) IVPB, 400 mg, Intravenous, Once, 6 of 9 cycles  Administration: 400 mg (11/17/2021), 400 mg (12/29/2021), 400 mg (2/7/2022), 400 mg (3/21/2022), 400 mg (5/2/2022), 400 mg (6/16/2022)          HISTORY OF PRESENT ILLNESS: Cherri Serna is a 29 y o  female  who has stage IIb melanoma who was on treatment with adjuvant pembrolizumab when she became pregnant  She is here today for follow-up and surveillance  She had developed hypothyroidism on PD1 inhibitor, and she has been followed by endocrinology during her pregnancy for TSH monitoring and adjusting of levothyroxine  She is also followed by [facility] for monitoring due to her exposure to pembrolizumab  We stopped the treatment as soon as we found out she was pregnant  She is doing well today  Denies any new, changing, or concerning skin lesions  Denies any lymphadenopathy  She continues to follow with dermatology and has an appointment tomorrow  She did undergo imaging recently I reviewed these imagings and we discussed with her that there is no evidence of melanoma recurrence or metastasis at this time  REVIEW OF SYSTEMS:  Review of Systems   Constitutional: Negative for appetite change, fatigue, fever and unexpected weight change  HENT:   Negative for lump/mass  Eyes: Negative for icterus  Respiratory: Negative for cough, shortness of breath and wheezing  Cardiovascular: Negative for leg swelling  Gastrointestinal: Negative for abdominal pain, constipation, diarrhea, nausea and vomiting  Genitourinary: Negative for difficulty urinating and hematuria  Musculoskeletal: Negative for arthralgias, gait problem and myalgias  Skin: Negative for itching and rash  No new, changing, or concerning lesions     Neurological: Negative for extremity weakness, gait problem, headaches, light-headedness and numbness  Hematological: Negative for adenopathy  MEDICATIONS:    Current Outpatient Medications:   •  clindamycin (CLEOCIN T) 1 % lotion, Apply to face, Disp: , Rfl:   •  docusate sodium (COLACE) 250 MG capsule, Take 250 mg by mouth daily Patient reports she is taking off brand colace (not sure of the exact name) PRN, Disp: , Rfl:   •  levothyroxine 137 mcg tablet, TAKE ONE TAB BY MOUTH MONDAY-SATURDAY AND ONE AND HALF TABLET ON SUNDAY, Disp: 90 tablet, Rfl: 1  •  Prenatal Vit-Fe Fumarate-FA (PRENATAL VITAMIN PO), Take 1 tablet by mouth in the morning, Disp: , Rfl:      ALLERGIES:  Allergies   Allergen Reactions   • Strawberry Extract - Food Allergy Swelling        ACTIVE PROBLEMS:  Patient Active Problem List   Diagnosis   • ADD (attention deficit disorder) without hyperactivity   • Malignant melanoma of shoulder, right (HCC)   • Family history of breast cancer   • High risk medications (not anticoagulants) long-term use   • Immunotherapy   • Hypothyroidism   • First trimester bleeding   • Cancer complicating pregnancy in second trimester   • Abnormal MSAFP (maternal serum alpha-fetoprotein), elevated   • Thyroid disease affecting pregnancy          PAST MEDICAL HISTORY:   Past Medical History:   Diagnosis Date   • Lymphoma ([facility] [address] )         PAST SURGICAL HISTORY:  Past Surgical History:   Procedure Laterality Date   • LYMPH NODE BIOPSY Right 8/18/2021    Procedure: BIOPSY LYMPH NODE SENTINEL, INTRAOPERATIVE   LYMPHATIC MAPPING, LYMPHOSCINTIGRAPHY (NUC MED INJECT AT 0730);   Surgeon: Valente Argueta MD;  Location: [facility];  Service: Surgical Oncology   • SKIN LESION EXCISION Right 8/18/2021    Procedure: EXCISION WIDE LESION UPPER EXTREMITY;  Surgeon: Valente Argueta MD;  Location: [facility];  Service: Surgical Oncology   • SPLIT THICKNESS SKIN GRAFT Right 8/18/2021    Procedure: RIGHT SHOULDER  COMPLEX CLOSURE;  Surgeon: Yakelin Clark MD;  Location: [facility];  Service: Plastics   • MARLENI TOOTH EXTRACTION          SOCIAL HISTORY:  Social History     Socioeconomic History   • Marital status: Single     Spouse name: None   • Number of children: None   • Years of education: None   • Highest education level: None   Occupational History   • None   Tobacco Use   • Smoking status: Former     Packs/day: 0 25     Years: 1 00     Pack years: 0 25     Types: Cigarettes     Quit date:      Years since quittin 9   • Smokeless tobacco: Never   • Tobacco comments:     about 2-3 years ago    Vaping Use   • Vaping Use: Never used   Substance and Sexual Activity   • Alcohol use: Not Currently     Alcohol/week: 1 0 standard drink     Types: 1 Cans of beer per week     Comment: rare   • Drug use: No   • Sexual activity: Yes     Partners: Male   Other Topics Concern   • None   Social History Narrative    Denied history of daily coffee consumption      Social Determinants of Health     Financial Resource Strain: Not on file   Food Insecurity: Not on file   Transportation Needs: Not on file   Physical Activity: Not on file   Stress: Not on file   Social Connections: Not on file   Intimate Partner Violence: Not on file   Housing Stability: Not on file        FAMILY HISTORY:  Family History   Problem Relation Age of Onset   • No Known Problems Mother    • Breast cancer Maternal Grandmother    • Breast cancer Maternal Aunt            Objective    PHYSICAL EXAMINATION:   Blood pressure 110/64, pulse 77, temperature 97 7 °F (36 5 °C), resp  rate 18, height 5' 7" (1 702 m), weight 77 1 kg (170 lb), SpO2 98 %  ECOG Performance Status    Flowsheet Row Most Recent Value   ECOG Performance Status 0 - Fully active, able to carry on all pre-disease performance without restriction               Physical Exam  Constitutional:       General: She is not in acute distress  Appearance: Normal appearance  She is not toxic-appearing     HENT:      Mouth/Throat:      Mouth: Mucous membranes are moist       Pharynx: Oropharynx is clear    Eyes:      General: No scleral icterus  Cardiovascular:      Rate and Rhythm: Normal rate and regular rhythm  Pulses: Normal pulses  Heart sounds: No murmur heard  No friction rub  No gallop  Pulmonary:      Effort: Pulmonary effort is normal  No respiratory distress  Breath sounds: Normal breath sounds  No wheezing or rales  Abdominal:      General: There is no distension  Palpations: There is no mass  Tenderness: There is no abdominal tenderness  There is no rebound  Musculoskeletal:         General: No swelling or tenderness  Right lower leg: No edema  Left lower leg: No edema  Lymphadenopathy:      Head:      Right side of head: No submandibular, preauricular or posterior auricular adenopathy  Left side of head: No submandibular, preauricular or posterior auricular adenopathy  Cervical: No cervical adenopathy  Right cervical: No superficial or posterior cervical adenopathy  Left cervical: No superficial or posterior cervical adenopathy  Upper Body:      Right upper body: No supraclavicular or axillary adenopathy  Left upper body: No supraclavicular or axillary adenopathy  Skin:     Findings: No rash  Comments: Well healed surgical scar  No evidence of recurrence at primary site  Neurological:      General: No focal deficit present  Mental Status: She is alert and oriented to person, place, and time  Psychiatric:         Mood and Affect: Mood normal          Behavior: Behavior normal          Thought Content: Thought content normal          Judgment: Judgment normal          I reviewed lab data in the chart      WBC   Date Value Ref Range Status   10/14/2022 7 68 4 31 - 10 16 Thousand/uL Final   07/18/2022 8 44 4 31 - 10 16 Thousand/uL Final   06/14/2022 7 57 4 31 - 10 16 Thousand/uL Final     Hemoglobin   Date Value Ref Range Status   10/14/2022 12 2 11 5 - 15 4 g/dL Final   07/18/2022 13 7 11 5 - 15 4 g/dL Final 06/14/2022 13 6 11 5 - 15 4 g/dL Final     Platelets   Date Value Ref Range Status   10/14/2022 254 149 - 390 Thousands/uL Final   07/18/2022 273 149 - 390 Thousands/uL Final   06/14/2022 275 149 - 390 Thousands/uL Final     MCV   Date Value Ref Range Status   10/14/2022 88 82 - 98 fL Final   07/18/2022 88 82 - 98 fL Final   06/14/2022 90 82 - 98 fL Final      Potassium   Date Value Ref Range Status   10/14/2022 4 3 3 5 - 5 3 mmol/L Final   07/18/2022 5 0 3 5 - 5 3 mmol/L Final   06/14/2022 4 5 3 5 - 5 3 mmol/L Final     Chloride   Date Value Ref Range Status   10/14/2022 109 (H) 96 - 108 mmol/L Final   07/18/2022 105 96 - 108 mmol/L Final   06/14/2022 110 (H) 100 - 108 mmol/L Final     CO2   Date Value Ref Range Status   10/14/2022 23 21 - 32 mmol/L Final   07/18/2022 29 21 - 32 mmol/L Final   06/14/2022 27 21 - 32 mmol/L Final     BUN   Date Value Ref Range Status   10/14/2022 9 5 - 25 mg/dL Final   07/18/2022 11 5 - 25 mg/dL Final   06/14/2022 11 5 - 25 mg/dL Final     Creatinine   Date Value Ref Range Status   10/14/2022 0 57 (L) 0 60 - 1 30 mg/dL Final     Comment:     Standardized to IDMS reference method   07/18/2022 0 75 0 60 - 1 30 mg/dL Final     Comment:     Standardized to IDMS reference method   06/14/2022 0 82 0 60 - 1 30 mg/dL Final     Comment:     Standardized to IDMS reference method     Glucose   Date Value Ref Range Status   07/18/2022 82 65 - 140 mg/dL Final     Comment:     If the patient is fasting, the ADA then defines impaired fasting glucose as > 100 mg/dL and diabetes as > or equal to 123 mg/dL  Specimen collection should occur prior to Sulfasalazine administration due to the potential for falsely depressed results  Specimen collection should occur prior to Sulfapyridine administration due to the potential for falsely elevated results     06/14/2022 91 65 - 140 mg/dL Final     Comment:     If the patient is fasting, the ADA then defines impaired fasting glucose as > 100 mg/dL and diabetes as > or equal to 123 mg/dL  Specimen collection should occur prior to Sulfasalazine administration due to the potential for falsely depressed results  Specimen collection should occur prior to Sulfapyridine administration due to the potential for falsely elevated results  02/02/2022 122 65 - 140 mg/dL Final     Comment:     If the patient is fasting, the ADA then defines impaired fasting glucose as > 100 mg/dL and diabetes as > or equal to 123 mg/dL  Specimen collection should occur prior to Sulfasalazine administration due to the potential for falsely depressed results  Specimen collection should occur prior to Sulfapyridine administration due to the potential for falsely elevated results  Calcium   Date Value Ref Range Status   10/14/2022 8 8 8 3 - 10 1 mg/dL Final   07/18/2022 9 0 8 3 - 10 1 mg/dL Final   06/14/2022 9 0 8 3 - 10 1 mg/dL Final     Albumin   Date Value Ref Range Status   10/14/2022 2 8 (L) 3 5 - 5 0 g/dL Final   07/18/2022 3 7 3 5 - 5 0 g/dL Final   06/14/2022 3 6 3 5 - 5 0 g/dL Final     Total Bilirubin   Date Value Ref Range Status   10/14/2022 1 18 (H) 0 20 - 1 00 mg/dL Final     Comment:     Use of this assay is not recommended for patients undergoing treatment with eltrombopag due to the potential for falsely elevated results  07/18/2022 1 32 (H) 0 20 - 1 00 mg/dL Final     Comment:     Use of this assay is not recommended for patients undergoing treatment with eltrombopag due to the potential for falsely elevated results  06/14/2022 1 73 (H) 0 20 - 1 00 mg/dL Final     Comment:     Use of this assay is not recommended for patients undergoing treatment with eltrombopag due to the potential for falsely elevated results       Alkaline Phosphatase   Date Value Ref Range Status   10/14/2022 56 46 - 116 U/L Final   07/18/2022 66 46 - 116 U/L Final   06/14/2022 66 46 - 116 U/L Final     AST   Date Value Ref Range Status   10/14/2022 9 5 - 45 U/L Final     Comment:     Specimen collection should occur prior to Sulfasalazine administration due to the potential for falsely depressed results  07/18/2022 14 5 - 45 U/L Final     Comment:     Specimen collection should occur prior to Sulfasalazine administration due to the potential for falsely depressed results  06/14/2022 17 5 - 45 U/L Final     Comment:     Specimen collection should occur prior to Sulfasalazine administration due to the potential for falsely depressed results  ALT   Date Value Ref Range Status   10/14/2022 13 12 - 78 U/L Final     Comment:     Specimen collection should occur prior to Sulfasalazine and/or Sulfapyridine administration due to the potential for falsely depressed results  07/18/2022 15 12 - 78 U/L Final     Comment:     Specimen collection should occur prior to Sulfasalazine administration due to the potential for falsely depressed results  06/14/2022 17 12 - 78 U/L Final     Comment:     Specimen collection should occur prior to Sulfasalazine and/or Sulfapyridine administration due to the potential for falsely depressed results  LD   Date Value Ref Range Status   10/14/2022 179 81 - 234 U/L Final   07/18/2022 155 81 - 234 U/L Final   06/14/2022 206 81 - 234 U/L Final     No results found for: TSH  No results found for: [id number]   Free T4   Date Value Ref Range Status   12/29/2022 1 30 0 76 - 1 46 ng/dL Final     Comment:     Specimen collection should occur prior to Sulfasalazine administration due to the potential for falsely elevated results  11/29/2022 1 27 0 76 - 1 46 ng/dL Final     Comment:     Specimen collection should occur prior to Sulfasalazine administration due to the potential for falsely elevated results  10/14/2022 1 44 0 76 - 1 46 ng/dL Final     Comment:     Specimen collection should occur prior to Sulfasalazine administration due to the potential for falsely elevated results           RECENT IMAGING:  Procedure: CT chest wo contrast    Result Date: 12/8/2022  Narrative: CT CHEST WITHOUT IV CONTRAST INDICATION:   C43 61: Malignant melanoma of right upper limb, including shoulder  COMPARISON:  CT 4/1/2022  TECHNIQUE: CT examination of the chest was performed without intravenous contrast  Axial, sagittal, and coronal 2D reformatted images were created from the source data and submitted for interpretation  Radiation dose length product (DLP) for this visit:  186 68 mGy-cm   This examination, like all CT scans performed in the [facility], was performed utilizing techniques to minimize radiation dose exposure, including the use of iterative  reconstruction and automated exposure control  FINDINGS: LUNGS:  Lungs are clear  There is no tracheal or endobronchial lesion  PLEURA:  Unremarkable  HEART/GREAT VESSELS: Heart is unremarkable for patient's age  No thoracic aortic aneurysm  MEDIASTINUM AND SUNNY:  Unremarkable  CHEST WALL AND LOWER NECK:  Unremarkable  No adenopathy  VISUALIZED STRUCTURES IN THE UPPER ABDOMEN:  Unremarkable  OSSEOUS STRUCTURES:  No acute fracture or destructive osseous lesion  Impression: No evidence of metastatic disease in the chest  Workstation performed: [id number]     Procedure: MRI soft tissue neck wo contrast    Result Date: 12/7/2022  Narrative: MRI OF THE SOFT TISSUES OF THE NECK - WITHOUT CONTRAST INDICATION: C43 61: Malignant melanoma of right upper limb, including shoulder COMPARISON:  CT neck dated 4/1/2022 TECHNIQUE:  Multiplanar, multisequence imaging of the soft tissues of the neck was performed  IV Contrast:  Exam specifically ordered without contrast  IMAGE QUALITY:  Diagnostic  FINDINGS: VISUALIZED BRAIN PARENCHYMA:  Normal  VISUALIZED ORBITS AND PARANASAL SINUSES:  Normal visualized orbits  Mild mucosal thickening of the ethmoid air cells and maxillary sinuses  NASAL CAVITY AND NASOPHARYNX:  Normal  SUPRAHYOID NECK:  Normal oral cavity, tongue base, tonsillar fossa and epiglottis   INFRAHYOID NECK: Aryepiglottic folds, laryngeal tissues, and piriform sinuses are normal  THYROID GLAND:   Normal  PAROTID AND SUBMANDIBULAR GLANDS:  Normal  LYMPH NODES:  No enlarged or pathologic adenopathy  VASCULAR STRUCTURES:  Grossly normal flow voids of the cervical vasculature  THORACIC INLET: Lung apices and upper mediastinum are grossly unremarkable  CERVICAL SPINE:  Normal alignment of the cervical spine  Normal marrow signal   No gross evidence of degenerative disease  Cervical cord is grossly normal in signal      Impression: Normal noncontrast imaging of the neck  No soft tissue mass or enlarged lymph nodes  Workstation performed: [id number]             Assessment/Plan  Ms Rivera is a 29 yr female with stage IIb melanoma status post adjuvant pembrolizumab and recently pregnant here for follow-up and surveillance  1  Malignant melanoma of shoulder, right ([address] )  She is stage IIb melanoma and received 6 cycles of adjuvant pembrolizumab completing her last treatment on 6/16/2022  She continues in surveillance  We discussed that her imaging is negative for melanoma recurrence or metastasis  Labs reviewed and okay  She is currently due in March 2023  Typically we monitor every 3 months with physical exams and blood work, however this would be then due approximately around the time she is due to deliver her child  Therefore we will follow up with her in 6 months  At that time she will return with full set of full body imaging for monitoring and surveillance  She will also return with blood work  All orders placed and prescription provided  We also discussed that if she should need us prior to 6 months she can call and we will get her in to see me as soon as possible  She knows to call if there is any issues or concerns       - TSH, 3rd generation; Future  - T3, free; Future  - T4, free; Future  - CT neck chest abdomen pelvis w contrast; Future    2   High risk medications (not anticoagulants) long-term use  She was on treatment with immunotherapy and we will continue to monitor for side effects and lab abnormalities  We will monitor labs with each visit as she can experience delayed onset of immune mediated side effects  She knows to watch for signs and symptoms for immune mediated side effects  Orders placed and prescription provided for blood work at her return visit  - CBC and differential; Future  - Comprehensive metabolic panel; Future  - LD,Blood; Future  - TSH, 3rd generation; Future  - T3, free; Future  - T4, free; Future        Return in about 6 months (around 6/28/2023) for Office Visit, Imaging - See orders, scans, labs       Ihsan Haskins MD, PhD

## 2023-01-03 ENCOUNTER — TELEPHONE (OUTPATIENT)
Dept: OTHER | Facility: HOSPITAL | Age: 33
End: 2023-01-03

## 2023-01-03 DIAGNOSIS — E03.2 HYPOTHYROIDISM DUE TO MEDICATION: ICD-10-CM

## 2023-01-03 DIAGNOSIS — E07.9 THYROID DISEASE AFFECTING PREGNANCY: Primary | ICD-10-CM

## 2023-01-03 DIAGNOSIS — O99.280 THYROID DISEASE AFFECTING PREGNANCY: Primary | ICD-10-CM

## 2023-01-03 NOTE — RESULT ENCOUNTER NOTE
1st attempt made  Left message to call the office for lab results  Chief complaint:   Chief Complaint   Patient presents with   • Dizziness     Patient brought to Urgent Care by her dad.  Patient c/o HA, bodyaches x 3 days patient also c/o some dizziness when she wakes up in the middle of the night and states she has a feeling of  not being able to \"feel\" anything x 3 days.   • Headache   • Generalized Body Aches     Patient was tested for covid 19 this am at one of the drive through sites.       Vitals:  Visit Vitals  /65   Pulse 70   Temp 98.4 °F (36.9 °C) (Oral)   Resp 18   Wt 47.9 kg   SpO2 99%       HISTORY OF PRESENT ILLNESS     16-year-old female was brought to the walk-in clinic today by her father with concern for dizziness, headache, and generalized body aches.  Father states that patient's symptoms started over the last 3 days.  Patient was complaining that her dizziness seem to be in the middle of the night.  The 1st day that happened, she stated that it lasted an hour but since then, only 10-15 minutes.  She denies any dizziness presently.  Father states that they did go to take her and get tested at a COVID-19 testing facility.  They have not gotten the results yet.  Patient states that she does have some abdominal discomfort but no diarrhea.  No nausea or vomiting.  Temperature here in the clinic is 98.4° F. No documented fevers at home.      Other significant problems:  There are no active problems to display for this patient.      PAST MEDICAL, FAMILY AND SOCIAL HISTORY     Medications:  Current Outpatient Medications   Medication   • fluticasone (FLONASE) 50 MCG/ACT nasal spray     No current facility-administered medications for this visit.        Allergies:  ALLERGIES:   Allergen Reactions   • Pollen Other (See Comments)     Sneezing, puffy and watery eyes.   • Seasonal Other (See Comments)       Past Medical  History/Surgeries:  History reviewed. No pertinent past medical the COVID-19 testing.  Patient is not having any dizziness presently.  She is supposed to be on allergy medications but admits that she has not been taking them.  Home care instructions provided for review. Recommend self quarantine.  Patient should keep themselves well hydrated.  Home care instructions provided for review.       If symptoms worsen or fail to improve, patient should follow-up with their PCP.

## 2023-01-03 NOTE — TELEPHONE ENCOUNTER
Attempted to call the patient to discuss TSH free t4 and discuss about levels levothyroxine  Unfortunately unable to reach the patient, left voicemail  Would recommend continuing with current dose of levothyroxine 137 mcg 1 tablet Monday to Saturday and 1 5 tablet on Sundays    And repeat TSH and free T4 in about 4 to 6 weeks, which can be done prior to her next appointment and we can discuss the results at the follow-up appointment in February

## 2023-01-06 ENCOUNTER — TELEPHONE (OUTPATIENT)
Dept: PERINATAL CARE | Facility: OTHER | Age: 33
End: 2023-01-06

## 2023-01-06 NOTE — TELEPHONE ENCOUNTER
Spoke with Pt  Dr Lion Hedrick recommendation of starting NST and ELAYNE once weekly at 36 weeks provided  She verbalized understanding

## 2023-01-07 NOTE — PATIENT INSTRUCTIONS
Thank you for choosing us for your  care today  If you have any questions about your ultrasound or care, please do not hesitate to contact us or your primary obstetrician  Some general instructions for your pregnancy are:    Exercise: Aim for 22 minutes per day (150 minutes per week) of regular exercise  Walking is great! Nutrition: Choose healthy sources of calcium, iron, and protein  Learn about Preeclampsia: preeclampsia is a common, serious high blood pressure complication in pregnancy  A blood pressure of 570YJZN (systolic or top number) or 97HNEP (diastolic or bottom number) is not normal and needs evaluation by your doctor  Aspirin is sometimes prescribed in early pregnancy to prevent preeclampsia in women with risk factors - ask your obstetrician if you should be on this medication  For more resources, visit:  https://mothertobaby org/fact-sheets/low-dose-aspirin/  [employer] com [place]  https://www highriskpregnancyinfo org/preeclampsia  If you smoke, try to reduce how many cigarettes you smoke or try to quit completely  Do not vape  Other warning signs to watch out for in pregnancy or postpartum: chest pain, obstructed breathing or shortness of breath, seizures, thoughts of hurting yourself or your baby, bleeding, a painful or swollen leg, fever, or headache (see [facility] POST-BIRTH Warning Signs campaign)  If these happen call 911  Itching is also not normal in pregnancy and if you experience this, especially over your hands and feet, potentially worse at night, notify your doctors

## 2023-01-09 DIAGNOSIS — Z79.899 HIGH RISK MEDICATIONS (NOT ANTICOAGULANTS) LONG-TERM USE: ICD-10-CM

## 2023-01-09 DIAGNOSIS — E03.2 HYPOTHYROIDISM DUE TO MEDICATION: ICD-10-CM

## 2023-01-09 DIAGNOSIS — C43.61 MALIGNANT MELANOMA OF SHOULDER, RIGHT (HCC): Primary | ICD-10-CM

## 2023-01-09 RX ORDER — LEVOTHYROXINE SODIUM 137 UG/1
TABLET ORAL
Qty: 90 TABLET | Refills: 0 | Status: SHIPPED | OUTPATIENT
Start: 2023-01-09

## 2023-01-10 ENCOUNTER — ULTRASOUND (OUTPATIENT)
Dept: PERINATAL CARE | Facility: OTHER | Age: 33
End: 2023-01-10

## 2023-01-10 VITALS
HEIGHT: 67 IN | DIASTOLIC BLOOD PRESSURE: 78 MMHG | SYSTOLIC BLOOD PRESSURE: 124 MMHG | WEIGHT: 169 LBS | HEART RATE: 81 BPM | BODY MASS INDEX: 26.53 KG/M2

## 2023-01-10 DIAGNOSIS — Z3A.28 28 WEEKS GESTATION OF PREGNANCY: ICD-10-CM

## 2023-01-10 DIAGNOSIS — Z36.89 ENCOUNTER FOR ULTRASOUND TO CHECK FETAL GROWTH: ICD-10-CM

## 2023-01-10 DIAGNOSIS — O28.0 ABNORMAL MSAFP (MATERNAL SERUM ALPHA-FETOPROTEIN), ELEVATED: Primary | ICD-10-CM

## 2023-01-10 DIAGNOSIS — O9A.112 CANCER COMPLICATING PREGNANCY IN SECOND TRIMESTER: ICD-10-CM

## 2023-01-18 NOTE — PROGRESS NOTES
[id number] [facility]: Ms Chau Montana was seen today for fetal growth assessment ultrasound  See ultrasound report under "OB Procedures" tab  The time spent on this established patient on the encounter date included 5 minutes previsit service time reviewing records and precharting, 10 minutes face-to-face service time counseling regarding results and coordinating care, and  5 minutes charting, totalling 20 minutes  Please don't hesitate to contact our office with any concerns or questions    Philip Coppola MD

## 2023-02-07 ENCOUNTER — APPOINTMENT (OUTPATIENT)
Dept: LAB | Facility: MEDICAL CENTER | Age: 33
End: 2023-02-07

## 2023-02-07 DIAGNOSIS — E03.2 HYPOTHYROIDISM DUE TO MEDICATION: ICD-10-CM

## 2023-02-07 LAB
T4 FREE SERPL-MCNC: 1.28 NG/DL (ref 0.76–1.46)
TSH SERPL DL<=0.05 MIU/L-ACNC: 0.11 UIU/ML (ref 0.45–4.5)

## 2023-02-09 ENCOUNTER — OFFICE VISIT (OUTPATIENT)
Dept: ENDOCRINOLOGY | Facility: CLINIC | Age: 33
End: 2023-02-09

## 2023-02-09 VITALS
HEART RATE: 83 BPM | BODY MASS INDEX: 27.94 KG/M2 | DIASTOLIC BLOOD PRESSURE: 70 MMHG | SYSTOLIC BLOOD PRESSURE: 102 MMHG | WEIGHT: 178 LBS | HEIGHT: 67 IN

## 2023-02-09 DIAGNOSIS — E03.2 HYPOTHYROIDISM DUE TO MEDICATION: ICD-10-CM

## 2023-02-09 RX ORDER — LEVOTHYROXINE SODIUM 112 UG/1
112 TABLET ORAL DAILY
Qty: 90 TABLET | Refills: 1 | Status: SHIPPED | OUTPATIENT
Start: 2023-02-09

## 2023-02-09 RX ORDER — LEVOTHYROXINE SODIUM 137 UG/1
TABLET ORAL
Qty: 60 TABLET | Refills: 0 | Status: SHIPPED | OUTPATIENT
Start: 2023-02-09

## 2023-02-09 NOTE — PROGRESS NOTES
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 who is seen for follow up for management of hypothyroidism in pregnancy    Plan:  Hypothyroidism in pregnancy  TSH 0 106 (below goal- 0 3 to 3 for 3rd trimester), free t4 1 28 Feb 2023  We will reduce levothyroxine dose to 137 mcg daily from 137 mcg 1 tab mon-sat and 1 5 tab on sundays, sent for updated Rx  Will repeat TSH and free t4 in 4 weeks  Recommended the patient to go back to pre-pregnancy dose of levothyroxine 112 mcg daily post partum, repeat TSH and free t4 in 6 weeks post partum, Sent rx for levothyroxine 112mcg to the pharmacy  Discussed with the patient that levothyroxine is safe to take while breast feeding  CC:   Hypothyroidism    History of Present Illness     HPI:  This is a 33 y o 35 wk pregnant female with PMH of stage 2B melanoma of R shoulder on adjuvant therapy of pembrolizumab, hypothyroidism secondary to pembrolizumab treatment who is seen for follow up for management of hypothyroidism in pregnancy    Last seen in Oct 2022    Current regimen: Levothyroxine 137 mcg 1 tab from Monday- Satuday and 1 5 tab on Sunday, takes on empty stomach, avoids eating or drinking for 30-60 mins after taking levothyroxine and takes prenatal vitamins at bedtime  Reports to have some heat intolerance  Doing well, No skin, hair, nail, energy changes  Denies any sx of diarrhea, palpitations, tremulousness, anxiety    Due for delivery on March 28th/31st  Plans to breast feed after delivery    ,Review of Systems   Constitutional: Negative for activity change, appetite change and fatigue  HENT: Negative for congestion and sore throat  Eyes: Negative for redness and visual disturbance     Respiratory: Negative for cough and shortness of breath  Cardiovascular: Negative for palpitations and leg swelling  Gastrointestinal: Negative for abdominal pain, constipation, diarrhea, nausea and vomiting  Endocrine: Positive for heat intolerance  Negative for cold intolerance, polydipsia, polyphagia and polyuria  Genitourinary: Negative for difficulty urinating and dysuria  Musculoskeletal: Negative for gait problem and neck pain  Skin: Negative for color change  Neurological: Negative for dizziness and syncope  Psychiatric/Behavioral: Negative for agitation and behavioral problems  All other systems reviewed and are negative  Historical Information   Past Medical History:   Diagnosis Date   • Lymphoma [facility])      Past Surgical History:   Procedure Laterality Date   • LYMPH NODE BIOPSY Right 2021    Procedure: BIOPSY LYMPH NODE SENTINEL, INTRAOPERATIVE   LYMPHATIC MAPPING, LYMPHOSCINTIGRAPHY (NUC MED INJECT AT 0730);   Surgeon: Michael Corcoran MD;  [facility];  Service: Surgical Oncology   • SKIN LESION EXCISION Right 2021    Procedure: EXCISION WIDE LESION UPPER EXTREMITY;  Surgeon: Michael Corcoran MD;  [facility];  Service: Surgical Oncology   • SPLIT THICKNESS SKIN GRAFT Right 2021    Procedure: RIGHT SHOULDER  COMPLEX CLOSURE;  Surgeon: Prabhakar Noel MD;  Location: [facility];  Service: Plastics   • WISDOM TOOTH EXTRACTION       Social History   Social History     Substance and Sexual Activity   Alcohol Use Yes   • Alcohol/week: 1 0 standard drink   • Types: 1 Cans of beer per week    Comment: rare     Social History     Substance and Sexual Activity   Drug Use No     Social History     Tobacco Use   Smoking Status Former   • Packs/day: 0 25   • Years: 1 00   • Pack years: 0 25   • Types: Cigarettes   • Quit date:    • Years since quittin 1   Smokeless Tobacco Never   Tobacco Comments    about 2-3 years ago      Family History:   Family History   Problem Relation Age of Onset   • No Known Problems Mother    • Breast cancer Maternal Grandmother    • Breast cancer Maternal Aunt    • Hypothyroidism Maternal Uncle        Meds/Allergies   Current Outpatient Medications   Medication Sig Dispense Refill   • clindamycin (CLEOCIN T) 1 % lotion Apply to face     • docusate sodium (COLACE) 250 MG capsule Take 250 mg by mouth daily Patient reports she is taking off brand colace (not sure of the exact name) PRN     • levothyroxine 112 mcg tablet Take 1 tablet (112 mcg total) by mouth daily 90 tablet 1   • levothyroxine 137 mcg tablet Take one tablet by mouth daily on an empty stomach 60 tablet 0   • Prenatal Vit-Fe Fumarate-FA (PRENATAL VITAMIN PO) Take 1 tablet by mouth in the morning       No current facility-administered medications for this visit  Allergies   Allergen Reactions   • Strawberry Extract - Food Allergy Swelling       Objective   Vitals: Blood pressure 102/70, pulse 83, height 5' 7" (1 702 m), weight 80 7 kg (178 lb)  Physical Exam  Constitutional:       Appearance: Normal appearance  HENT:      Head: Normocephalic and atraumatic  Cardiovascular:      Rate and Rhythm: Normal rate and regular rhythm  Pulses: Normal pulses  Heart sounds: Normal heart sounds  No murmur heard  No gallop  Pulmonary:      Effort: Pulmonary effort is normal       Breath sounds: Normal breath sounds  No wheezing or rales  Abdominal:      Palpations: Abdomen is soft  Tenderness: There is no abdominal tenderness  Musculoskeletal:         General: No swelling  Cervical back: Normal range of motion and neck supple  No tenderness  Right lower leg: No edema  Left lower leg: No edema  Lymphadenopathy:      Cervical: No cervical adenopathy  Skin:     General: Skin is warm and dry  Neurological:      Mental Status: She is alert and oriented to person, place, and time  Mental status is at baseline     Psychiatric:         Mood and Affect: Mood normal  Behavior: Behavior normal          The history was obtained from the review of the chart, patient  Lab Results:   Lab Results   Component Value Date/Time    TSH 3RD GENERATON 0 106 (L) 02/07/2023 04:30 PM    TSH 3RD GENERATON 0 222 (L) 12/29/2022 08:56 AM    TSH 3RD GENERATON 0 178 (L) 11/29/2022 03:42 PM    Free T4 1 28 02/07/2023 04:30 PM    Free T4 1 30 12/29/2022 08:56 AM    Free T4 1 27 11/29/2022 03:42 PM       Imaging Studies:       I have personally reviewed pertinent reports  Portions of the record may have been created with voice recognition software  Occasional wrong word or "sound a like" substitutions may have occurred due to the inherent limitations of voice recognition software  Read the chart carefully and recognize, using context, where substitutions have occurred

## 2023-02-24 ENCOUNTER — ULTRASOUND (OUTPATIENT)
Dept: PERINATAL CARE | Facility: OTHER | Age: 33
End: 2023-02-24

## 2023-02-24 VITALS
HEART RATE: 80 BPM | SYSTOLIC BLOOD PRESSURE: 122 MMHG | BODY MASS INDEX: 28.16 KG/M2 | DIASTOLIC BLOOD PRESSURE: 60 MMHG | HEIGHT: 67 IN | WEIGHT: 179.4 LBS

## 2023-02-24 DIAGNOSIS — O28.0 ABNORMAL MSAFP (MATERNAL SERUM ALPHA-FETOPROTEIN), ELEVATED: ICD-10-CM

## 2023-02-24 DIAGNOSIS — C43.61 MALIGNANT MELANOMA OF SHOULDER, RIGHT (HCC): ICD-10-CM

## 2023-02-24 DIAGNOSIS — Z3A.35 35 WEEKS GESTATION OF PREGNANCY: Primary | ICD-10-CM

## 2023-02-24 DIAGNOSIS — Z36.89 ENCOUNTER FOR ULTRASOUND TO CHECK FETAL GROWTH: ICD-10-CM

## 2023-02-24 NOTE — PROGRESS NOTES
[address]: Ms [address] Whitehead was seen today for fetal growth assessment ultrasound  See ultrasound report under "OB Procedures" tab  Please don't hesitate to contact our office with any concerns or questions    -Marcia Florence MD

## 2023-03-09 DIAGNOSIS — E03.2 HYPOTHYROIDISM DUE TO MEDICATION: ICD-10-CM

## 2023-03-09 RX ORDER — LEVOTHYROXINE SODIUM 137 UG/1
137 TABLET ORAL DAILY
Qty: 90 TABLET | Refills: 0 | Status: SHIPPED | OUTPATIENT
Start: 2023-03-09

## 2023-04-01 DIAGNOSIS — E03.2 HYPOTHYROIDISM DUE TO MEDICATION: ICD-10-CM

## 2023-04-03 RX ORDER — LEVOTHYROXINE SODIUM 137 UG/1
TABLET ORAL
Qty: 90 TABLET | Refills: 1 | OUTPATIENT
Start: 2023-04-03

## 2023-04-03 RX ORDER — LEVOTHYROXINE SODIUM 112 UG/1
112 TABLET ORAL DAILY
COMMUNITY
Start: 2023-03-31

## 2023-04-03 NOTE — TELEPHONE ENCOUNTER
Spoke to pt  Asked that she go for labs  She confirmed she already had her baby and they have her on 112 mcg daily  Refill refused and med list updated

## 2023-05-09 ENCOUNTER — APPOINTMENT (OUTPATIENT)
Dept: LAB | Facility: MEDICAL CENTER | Age: 33
End: 2023-05-09

## 2023-05-09 DIAGNOSIS — E03.2 HYPOTHYROIDISM DUE TO MEDICATION: ICD-10-CM

## 2023-05-09 LAB
T4 FREE SERPL-MCNC: 1.34 NG/DL (ref 0.76–1.46)
TSH SERPL DL<=0.05 MIU/L-ACNC: 0.21 UIU/ML (ref 0.45–4.5)

## 2023-05-11 ENCOUNTER — OFFICE VISIT (OUTPATIENT)
Dept: ENDOCRINOLOGY | Facility: CLINIC | Age: 33
End: 2023-05-11

## 2023-05-11 VITALS
BODY MASS INDEX: 25.68 KG/M2 | HEIGHT: 67 IN | OXYGEN SATURATION: 98 % | WEIGHT: 163.6 LBS | DIASTOLIC BLOOD PRESSURE: 60 MMHG | HEART RATE: 60 BPM | SYSTOLIC BLOOD PRESSURE: 118 MMHG

## 2023-05-11 DIAGNOSIS — E03.2 HYPOTHYROIDISM DUE TO MEDICATION: Primary | ICD-10-CM

## 2023-05-11 RX ORDER — FERROUS SULFATE 325(65) MG
1 TABLET ORAL EVERY OTHER DAY
COMMUNITY
Start: 2023-03-31

## 2023-05-11 RX ORDER — ASCORBIC ACID 500 MG
TABLET ORAL
COMMUNITY
Start: 2023-03-31

## 2023-05-11 RX ORDER — LEVOTHYROXINE SODIUM 0.1 MG/1
100 TABLET ORAL DAILY
Qty: 90 TABLET | Refills: 1 | Status: SHIPPED | OUTPATIENT
Start: 2023-05-11

## 2023-05-11 NOTE — PROGRESS NOTES
Federica Rivera 29 y o  female MRN: [id number]    Encounter: [id number]      Assessment/Plan     Assessment: This is a 32 y  o with PMH of stage 2B melanoma of R shoulder on adjuvant therapy of pembrolizumab, hypothyroidism secondary to pembrolizumab treatment who is seen for follow up for management of hypothyroidism secondary to immune checkpoint inhibitor use    Plan:  Hypothyroidism 2/2 immune checkpoint inhibitor use   TSH  0 213 (about 6 weeks post partum) from 0 106 (3rd trimester)  We will reduce levothyroxine to 100 mcg daily from 112 mcg daily, repeat TSH, free T4 in about 4 to 6 weeks  Goal TSH is around 2, now that she is postpartum  Discussed with the patient to separate the levothyroxine from iron or calcium-containing medications for at least 4 hours  She will start taking iron supplementation at bedtime      CC:   hypothyroidism    History of Present Illness     HPI:  This is a 33 y  o with PMH of stage 2B melanoma of R shoulder on adjuvant therapy of pembrolizumab, hypothyroidism secondary to pembrolizumab treatment who is seen for follow up for management of hypothyroidism secondary to immune checkpoint inhibitor use    She was last seen in Feb 2023    Since last visit, she delivered baby girl on march 30th  Is currently breast-feeding    Current regimen:  Levothyroxine 112mcg daily since last 6 weeks  Takes ferrous sulfate with levothyroxine  Takes Prenatals and vitamin C at bedtime    Doing well  Denies any skin, hair, nail, energy, temperature, bowel habit related changes  Denies any sx of diarrhea, palpitations, tremulousness, anxiety  There are no plans for pembrolizumab to be restarted as per the patient  She starts work in a few weeks before she is off for the summer      Review of Systems   Constitutional: Negative for activity change, appetite change and fatigue  HENT: Negative for congestion and sore throat  Eyes: Negative for redness and visual disturbance     Respiratory: Negative for cough and shortness of breath  Cardiovascular: Negative for palpitations and leg swelling  Gastrointestinal: Negative for abdominal pain, constipation, diarrhea, nausea and vomiting  Endocrine: Negative for cold intolerance, heat intolerance, polydipsia, polyphagia and polyuria  Genitourinary: Negative for difficulty urinating and dysuria  Musculoskeletal: Negative for gait problem and neck pain  Skin: Negative for color change  Neurological: Negative for dizziness and syncope  Psychiatric/Behavioral: Negative for agitation and behavioral problems  All other systems reviewed and are negative  Historical Information   Past Medical History:   Diagnosis Date   • Lymphoma [facility])      Past Surgical History:   Procedure Laterality Date   • LYMPH NODE BIOPSY Right 2021    Procedure: BIOPSY LYMPH NODE SENTINEL, INTRAOPERATIVE   LYMPHATIC MAPPING, LYMPHOSCINTIGRAPHY (NUC MED INJECT AT 0730);   Surgeon: Mirian Mast MD;  [facility];  Service: Surgical Oncology   • SKIN LESION EXCISION Right 2021    Procedure: EXCISION WIDE LESION UPPER EXTREMITY;  Surgeon: Mirian Mast MD;  [facility];  Service: Surgical Oncology   • SPLIT THICKNESS SKIN GRAFT Right 2021    Procedure: RIGHT SHOULDER  COMPLEX CLOSURE;  Surgeon: Britany Samayoa MD;  [facility];  Service: Plastics   • WISDOM TOOTH EXTRACTION       Social History   Social History     Substance and Sexual Activity   Alcohol Use Yes   • Alcohol/week: 1 0 standard drink   • Types: 1 Cans of beer per week    Comment: rare     Social History     Substance and Sexual Activity   Drug Use No     Social History     Tobacco Use   Smoking Status Former   • Packs/day: 0 25   • Years: 1 00   • Pack years: 0 25   • Types: Cigarettes   • Quit date:    • Years since quittin 3   Smokeless Tobacco Never   Tobacco Comments    about 2-3 years ago      Family History:   Family History   Problem Relation Age of Onset   • No Known Problems Mother    • Breast cancer Maternal Grandmother    • Breast cancer Maternal Aunt    • Hypothyroidism Maternal Uncle        Meds/Allergies   Current Outpatient Medications   Medication Sig Dispense Refill   • clindamycin (CLEOCIN T) 1 % lotion Apply to face     • docusate sodium (COLACE) 250 MG capsule Take 250 mg by mouth daily Patient reports she is taking off brand colace (not sure of the exact name) PRN     • levothyroxine 112 mcg tablet Take 112 mcg by mouth in the morning     • Prenatal Vit-Fe Fumarate-FA (PRENATAL VITAMIN PO) Take 1 tablet by mouth in the morning       No current facility-administered medications for this visit  Allergies   Allergen Reactions   • Strawberry Extract - Food Allergy Swelling       Objective   Vitals: There were no vitals taken for this visit  Physical Exam  Constitutional:       Appearance: Normal appearance  HENT:      Head: Normocephalic and atraumatic  Cardiovascular:      Rate and Rhythm: Normal rate and regular rhythm  Pulses: Normal pulses  Heart sounds: Normal heart sounds  No murmur heard  No gallop  Pulmonary:      Effort: Pulmonary effort is normal       Breath sounds: Normal breath sounds  No wheezing or rales  Abdominal:      General: Bowel sounds are normal       Palpations: Abdomen is soft  Tenderness: There is no abdominal tenderness  Musculoskeletal:         General: No swelling  Cervical back: Normal range of motion  No tenderness  Right lower leg: No edema  Left lower leg: No edema  Lymphadenopathy:      Cervical: No cervical adenopathy  Skin:     General: Skin is warm  Neurological:      Mental Status: She is alert and oriented to person, place, and time  Mental status is at baseline  Psychiatric:         Mood and Affect: Mood normal          Behavior: Behavior normal          The history was obtained from the review of the chart, patient      Lab Results:   Lab "Results   Component Value Date/Time    TSH 3RD GENERATON 0 213 (L) 05/09/2023 10:27 AM    TSH 3RD GENERATON 0 106 (L) 02/07/2023 04:30 PM    TSH 3RD GENERATON 0 222 (L) 12/29/2022 08:56 AM    Free T4 1 34 05/09/2023 10:27 AM    Free T4 1 28 02/07/2023 04:30 PM    Free T4 1 30 12/29/2022 08:56 AM       Imaging Studies:       I have personally reviewed pertinent reports  Portions of the record may have been created with voice recognition software  Occasional wrong word or \"sound a like\" substitutions may have occurred due to the inherent limitations of voice recognition software  Read the chart carefully and recognize, using context, where substitutions have occurred    "

## 2023-06-08 ENCOUNTER — TELEPHONE (OUTPATIENT)
Dept: HEMATOLOGY ONCOLOGY | Facility: CLINIC | Age: 33
End: 2023-06-08

## 2023-06-08 NOTE — TELEPHONE ENCOUNTER
Appointment Change  Cancel, Reschedule, Change to Virtual      Who are you speaking with? Patient   If it is not the patient, are they listed on an active communication consent form? N/A   Which provider is the appointment scheduled with? Dr Catina Fields   When is the appointment scheduled? Please list date and time 7/3/23  2:20pm   At which location is the appointment scheduled to take place? [facility]   Was the appointment rescheduled or changed from an in person visit to a virtual visit? If so, please list the details of the change  7/3/23  8:20am   What is the reason for the appointment change? Provider on Rounds   Was STAR transport scheduled for this visit? No   Does STAR transport need to be scheduled for the new visit (if applicable) No   Does the patient need an infusion appointment rescheduled? No   Does the patient have an infusion appointment scheduled? If so, when? No   Is the patient undergoing chemotherapy? No   Was the no-show policy reviewed for appointments being changed with less then 24 hours of notice?  Yes

## 2023-06-13 ENCOUNTER — APPOINTMENT (OUTPATIENT)
Dept: LAB | Facility: MEDICAL CENTER | Age: 33
End: 2023-06-13
Payer: COMMERCIAL

## 2023-06-13 DIAGNOSIS — Z79.899 HIGH RISK MEDICATIONS (NOT ANTICOAGULANTS) LONG-TERM USE: ICD-10-CM

## 2023-06-13 DIAGNOSIS — E03.2 HYPOTHYROIDISM DUE TO MEDICATION: ICD-10-CM

## 2023-06-13 DIAGNOSIS — C43.61 MALIGNANT MELANOMA OF SHOULDER, RIGHT (HCC): ICD-10-CM

## 2023-06-13 LAB
T4 FREE SERPL-MCNC: 0.87 NG/DL (ref 0.61–1.12)
TSH SERPL DL<=0.05 MIU/L-ACNC: 0.13 UIU/ML (ref 0.45–4.5)

## 2023-06-13 PROCEDURE — 84443 ASSAY THYROID STIM HORMONE: CPT

## 2023-06-13 PROCEDURE — 84439 ASSAY OF FREE THYROXINE: CPT

## 2023-06-14 ENCOUNTER — TELEPHONE (OUTPATIENT)
Dept: ENDOCRINOLOGY | Facility: CLINIC | Age: 33
End: 2023-06-14

## 2023-06-14 DIAGNOSIS — E03.2 HYPOTHYROIDISM DUE TO MEDICATION: ICD-10-CM

## 2023-06-14 RX ORDER — LEVOTHYROXINE SODIUM 88 UG/1
88 TABLET ORAL DAILY
Qty: 30 TABLET | Refills: 2 | Status: SHIPPED | OUTPATIENT
Start: 2023-06-14

## 2023-06-14 NOTE — TELEPHONE ENCOUNTER
Attempted to call the patient, however was unable to reach, left voicemail to reduce levothyroxine dose to 88mcg daily from 100mcg daily and to repeat TSH, free t4 in 4-6 weeks

## 2023-06-26 ENCOUNTER — HOSPITAL ENCOUNTER (OUTPATIENT)
Dept: CT IMAGING | Facility: HOSPITAL | Age: 33
Discharge: HOME | End: 2023-06-26
Attending: INTERNAL MEDICINE
Payer: COMMERCIAL

## 2023-06-26 DIAGNOSIS — C43.61 MALIGNANT MELANOMA OF SHOULDER, RIGHT (HCC): ICD-10-CM

## 2023-06-26 PROCEDURE — 70491 CT SOFT TISSUE NECK W/DYE: CPT

## 2023-06-26 PROCEDURE — 74177 CT ABD & PELVIS W/CONTRAST: CPT

## 2023-06-26 PROCEDURE — 71260 CT THORAX DX C+: CPT

## 2023-06-26 PROCEDURE — G1004 CDSM NDSC: HCPCS

## 2023-06-26 RX ADMIN — IOHEXOL 100 ML: 350 INJECTION, SOLUTION INTRAVENOUS at 10:46

## 2023-07-03 ENCOUNTER — OFFICE VISIT (OUTPATIENT)
Dept: HEMATOLOGY ONCOLOGY | Facility: CLINIC | Age: 33
End: 2023-07-03
Payer: COMMERCIAL

## 2023-07-03 VITALS
BODY MASS INDEX: 25.43 KG/M2 | HEART RATE: 55 BPM | TEMPERATURE: 97.3 F | HEIGHT: 67 IN | OXYGEN SATURATION: 98 % | SYSTOLIC BLOOD PRESSURE: 110 MMHG | DIASTOLIC BLOOD PRESSURE: 78 MMHG | RESPIRATION RATE: 16 BRPM | WEIGHT: 162 LBS

## 2023-07-03 DIAGNOSIS — E03.9 HYPOTHYROIDISM, UNSPECIFIED TYPE: ICD-10-CM

## 2023-07-03 DIAGNOSIS — Z29.8 IMMUNOTHERAPY: ICD-10-CM

## 2023-07-03 DIAGNOSIS — C43.61 MALIGNANT MELANOMA OF SHOULDER, RIGHT (HCC): Primary | ICD-10-CM

## 2023-07-03 DIAGNOSIS — Z79.899 HIGH RISK MEDICATIONS (NOT ANTICOAGULANTS) LONG-TERM USE: ICD-10-CM

## 2023-07-03 PROCEDURE — 99214 OFFICE O/P EST MOD 30 MIN: CPT | Performed by: INTERNAL MEDICINE

## 2023-07-03 NOTE — LETTER
August 6, 2023     Holly Priest DO  [address]  [address] [place] [address]    Patient: Evin Croinn   YOB: 1990   Date of Visit: 7/3/2023       Dear Dr. Clotilde Restrepo: Thank you for referring Serena Cheadle to me for evaluation. Below are my notes for this consultation. If you have questions, please do not hesitate to call me. I look forward to following your patient along with you. Sincerely,        Jessica Galvan MD        CC: Hali Deans, MD Ardella Ports, MD Annia Osgood, MD Lera Panning, MD  8/6/2023  9:46 PM  Sign when Signing Visit  [address] [place] [address]  [phone]     Date of Visit: 7/3/2023  Name: Evni Cronin   YOB: 1990        Subjective    VISIT DIAGNOSIS:  Diagnoses and all orders for this visit:    Malignant melanoma of shoulder, right ([address])  -     CBC and differential; Future  -     Comprehensive metabolic panel; Future  -     LD,Blood; Future  -     TSH, 3rd generation; Future  -     T3, free; Future  -     T4, free; Future    High risk medications (not anticoagulants) long-term use  -     CBC and differential; Future  -     Comprehensive metabolic panel; Future  -     LD,Blood; Future  -     TSH, 3rd generation; Future  -     T3, free; Future  -     T4, free;  Future    Immunotherapy    Hypothyroidism, unspecified type        Oncology History   Malignant melanoma of shoulder, right ([address])   7/8/2021 Biopsy    Right anterior shoulder:  - Ulcerated malignant melanoma, 2.5mm    Ulceration: Yes  Mitosis: >1    NGS Testing  MS-Stable  TMB - 13Muts/Mb  CDKN2A E10  TERT promoter -124C>T       7/8/2021 Genomic Testing    DecisionDx - Class 2B     7/8/2021 -  Cancer Staged    Staging form: Melanoma of the Skin, AJCC 8th Edition  - Pathologic stage from 7/8/2021: Stage IIB (pT3b, pN0, cM0) - Signed by Jessica Galvan MD on 9/20/2021  Stage prefix: Initial diagnosis       8/18/2021 Surgery    Wide excision melanoma with sentinel lymph node biopsy  No residual melanoma  LNs negative - 0/9 LNs     11/17/2021 - 6/16/2022 Chemotherapy    pembrolizumab (KEYTRUDA) IVPB, 400 mg, Intravenous, Once, 6 of 9 cycles  Administration: 400 mg (11/17/2021), 400 mg (12/29/2021), 400 mg (2/7/2022), 400 mg (3/21/2022), 400 mg (5/2/2022), 400 mg (6/16/2022)        Cancer Staging   Malignant melanoma of shoulder, right (HCC)  Staging form: Melanoma of the Skin, AJCC 8th Edition  - Pathologic stage from 7/8/2021: Stage IIB (pT3b, pN0, cM0) - Signed by Mohsen Zepeda MD on 9/20/2021     Treatment Details   Treatment goal Curative   Plan Name OP Pembrolizumab Q 42 Days   Status Inactive   Start Date 11/17/2021   End Date 6/16/2022   Provider Mohsen Zepeda MD   Chemotherapy pembrolizumab [facility]) IVPB, 400 mg, Intravenous, Once, 6 of 9 cycles  Administration: 400 mg (11/17/2021), 400 mg (12/29/2021), 400 mg (2/7/2022), 400 mg (3/21/2022), 400 mg (5/2/2022), 400 mg (6/16/2022)          HISTORY OF PRESENT ILLNESS: Ephraim Pavon is a 29 y.o. female who has stage IIb melanoma who received adjuvant pembrolizumab for 6 cycles before coming pregnant here for continued monitoring, follow-up, and surveillance. He delivered her daughter approximately 3 months ago. She is doing well and feels good. Energy is as expected. Doing well. Denies any new, changing, concerning skin lesions. Denies any lymphadenopathy. Continues to follow with dermatology. Denies any immune mediated side effects. Denies headaches, double vision, rash, itching, chest pain, shortness of breath, and diarrhea. Denies muscle weakness and fatigue. She underwent CT neck, chest, abdomen, and pelvis on 6/26/2023.   We discussed that CT neck demonstrates grossly stable exam with no concerning cervical lymphadenopathy and there is no evidence of melanoma recurrence or metastasis on CT chest, abdomen, and pelvis. REVIEW OF SYSTEMS:  Review of Systems   Constitutional: Negative for appetite change, fatigue, fever and unexpected weight change. HENT:   Negative for lump/mass, trouble swallowing and voice change. Eyes: Negative for icterus. Respiratory: Negative for cough, shortness of breath and wheezing. Cardiovascular: Negative for leg swelling. Gastrointestinal: Negative for abdominal pain, constipation, diarrhea, nausea and vomiting. Genitourinary: Negative for difficulty urinating and hematuria. Musculoskeletal: Negative for arthralgias, gait problem and myalgias. Skin: Negative for itching and rash. No new, changing, or concerning lesions. Neurological: Negative for extremity weakness, gait problem, headaches, light-headedness and numbness. Hematological: Negative for adenopathy.         MEDICATIONS:    Current Outpatient Medications:   •  clindamycin (CLEOCIN T) 1 % lotion, Apply to face, Disp: , Rfl:   •  docusate sodium (COLACE) 250 MG capsule, Take 250 mg by mouth daily Patient reports she is taking off brand colace (not sure of the exact name) PRN (Patient not taking: Reported on 7/25/2023), Disp: , Rfl:   •  Prenatal Vit-Fe Fumarate-FA (PRENATAL VITAMIN PO), Take 1 tablet by mouth in the morning, Disp: , Rfl:   •  betamethasone dipropionate (DIPROSONE) 0.05 % cream, Apply topically 2 (two) times a day, Disp: 30 g, Rfl: 0  •  levothyroxine 88 mcg tablet, TAKE 1 TABLET BY MOUTH EVERY DAY, Disp: 90 tablet, Rfl: 0     ALLERGIES:  Allergies   Allergen Reactions   • Strawberry Extract - Food Allergy Swelling        ACTIVE PROBLEMS:  Patient Active Problem List   Diagnosis   • ADD (attention deficit disorder) without hyperactivity   • Malignant melanoma of shoulder, right (HCC)   • Family history of breast cancer   • High risk medications (not anticoagulants) long-term use   • Immunotherapy   • Hypothyroidism   • First trimester bleeding   • Cancer complicating pregnancy in second trimester   • Abnormal MSAFP (maternal serum alpha-fetoprotein), elevated   • Thyroid disease affecting pregnancy          PAST MEDICAL HISTORY:   Past Medical History:   Diagnosis Date   • Cancer ([address] [facility])     Skin cancer   • Immunotherapy 2021   • Lymphoma ([address])    • Melanoma ([address])         PAST SURGICAL HISTORY:  Past Surgical History:   Procedure Laterality Date   • LYMPH NODE BIOPSY Right 2021    Procedure: BIOPSY LYMPH NODE SENTINEL, INTRAOPERATIVE . LYMPHATIC MAPPING, LYMPHOSCINTIGRAPHY (NUC MED INJECT AT 0730); Surgeon: Devon Oneil MD;  Location: [facility];  Service: Surgical Oncology   • SKIN LESION EXCISION Right 2021    Procedure: EXCISION WIDE LESION UPPER EXTREMITY;  Surgeon: Devon Oneil MD;  Location: [facility];  Service: Surgical Oncology   • SPLIT THICKNESS SKIN GRAFT Right 2021    Procedure: RIGHT SHOULDER  COMPLEX CLOSURE;  Surgeon: Cleopatra Paagn MD;  Location: [facility];  Service: Plastics   • WISDOM TOOTH EXTRACTION          SOCIAL HISTORY:  Social History     Socioeconomic History   • Marital status: Single     Spouse name: None   • Number of children: None   • Years of education: None   • Highest education level: None   Occupational History   • None   Tobacco Use   • Smoking status: Former     Packs/day: 0.25     Years: 1.00     Total pack years: 0.25     Types: Cigarettes     Quit date: 2018     Years since quittin.5   • Smokeless tobacco: Never   • Tobacco comments:     about 2-3 years ago    Vaping Use   • Vaping Use: Never used   Substance and Sexual Activity   • Alcohol use:  Yes     Alcohol/week: 1.0 standard drink of alcohol     Types: 1 Cans of beer per week     Comment: rare   • Drug use: No   • Sexual activity: Yes     Partners: Male   Other Topics Concern   • None   Social History Narrative    Denied history of daily coffee consumption      Social Determinants of Health     Financial Resource Strain: Not on file   Food Insecurity: Not on file   Transportation Needs: Not on file   Physical Activity: Not on file   Stress: Not on file   Social Connections: Not on file   Intimate Partner Violence: Not on file   Housing Stability: Not on file        FAMILY HISTORY:  Family History   Problem Relation Age of Onset   • No Known Problems Mother    • Breast cancer Maternal Grandmother    • Breast cancer Maternal Aunt    • Hypothyroidism Maternal Uncle            Objective    PHYSICAL EXAMINATION:   Blood pressure 110/78, pulse 55, temperature (!) 97.3 °F (36.3 °C), temperature source Temporal, resp. rate 16, height 5' 7" (1.702 m), weight 73.5 kg (162 lb), SpO2 98 %, unknown if currently breastfeeding. Pain Score: 0-No pain     ECOG Performance Status      Flowsheet Row Most Recent Value   ECOG Performance Status 0 - Fully active, able to carry on all pre-disease performance without restriction               Physical Exam  Constitutional:       General: She is not in acute distress. Appearance: Normal appearance. She is not toxic-appearing. HENT:      Head: Normocephalic and atraumatic. Right Ear: External ear normal.      Left Ear: External ear normal.      Nose: Nose normal.      Mouth/Throat:      Mouth: Mucous membranes are moist.      Pharynx: Oropharynx is clear. Eyes:      General: No scleral icterus. Right eye: No discharge. Left eye: No discharge. Conjunctiva/sclera: Conjunctivae normal.   Cardiovascular:      Rate and Rhythm: Normal rate and regular rhythm. Pulses: Normal pulses. Heart sounds: No murmur heard. No friction rub. No gallop. Pulmonary:      Effort: Pulmonary effort is normal. No respiratory distress. Breath sounds: Normal breath sounds. No wheezing or rales. Abdominal:      General: Bowel sounds are normal. There is no distension. Palpations: There is no mass. Tenderness: There is no abdominal tenderness. There is no rebound. Musculoskeletal:         General: No swelling or tenderness. Cervical back: Normal range of motion. No rigidity. Right lower leg: No edema. Left lower leg: No edema. Lymphadenopathy:      Head:      Right side of head: No submandibular, preauricular or posterior auricular adenopathy. Left side of head: No submandibular, preauricular or posterior auricular adenopathy. Cervical: No cervical adenopathy. Right cervical: No superficial or posterior cervical adenopathy. Left cervical: No superficial or posterior cervical adenopathy. Upper Body:      Right upper body: No supraclavicular or axillary adenopathy. Left upper body: No supraclavicular or axillary adenopathy. Lower Body: No right inguinal adenopathy. No left inguinal adenopathy. Skin:     General: Skin is warm. Coloration: Skin is not jaundiced. Findings: No rash. Comments: Well healed surgical scar. No evidence of recurrence at primary site. Neurological:      General: No focal deficit present. Mental Status: She is alert and oriented to person, place, and time. Cranial Nerves: No cranial nerve deficit. Motor: No weakness. Gait: Gait normal.   Psychiatric:         Mood and Affect: Mood normal.         Behavior: Behavior normal.         Thought Content: Thought content normal.         Judgment: Judgment normal.         I reviewed lab data in the chart.     WBC   Date Value Ref Range Status   06/13/2023 8.66 4.31 - 10.16 Thousand/uL Final   10/14/2022 7.68 4.31 - 10.16 Thousand/uL Final   07/18/2022 8.44 4.31 - 10.16 Thousand/uL Final     Hemoglobin   Date Value Ref Range Status   06/13/2023 14.2 11.5 - 15.4 g/dL Final   10/14/2022 12.2 11.5 - 15.4 g/dL Final   07/18/2022 13.7 11.5 - 15.4 g/dL Final     Platelets   Date Value Ref Range Status   06/13/2023 311 149 - 390 Thousands/uL Final   10/14/2022 254 149 - 390 Thousands/uL Final   07/18/2022 273 149 - 390 Thousands/uL Final     MCV   Date Value Ref Range Status   06/13/2023 87 82 - 98 fL Final   10/14/2022 88 82 - 98 fL Final   07/18/2022 88 82 - 98 fL Final      Potassium   Date Value Ref Range Status   06/13/2023 4.5 3.5 - 5.3 mmol/L Final   10/14/2022 4.3 3.5 - 5.3 mmol/L Final   07/18/2022 5.0 3.5 - 5.3 mmol/L Final     Chloride   Date Value Ref Range Status   06/13/2023 111 (H) 96 - 108 mmol/L Final   10/14/2022 109 (H) 96 - 108 mmol/L Final   07/18/2022 105 96 - 108 mmol/L Final     CO2   Date Value Ref Range Status   06/13/2023 28 21 - 32 mmol/L Final   10/14/2022 23 21 - 32 mmol/L Final   07/18/2022 29 21 - 32 mmol/L Final     BUN   Date Value Ref Range Status   06/13/2023 13 5 - 25 mg/dL Final   10/14/2022 9 5 - 25 mg/dL Final   07/18/2022 11 5 - 25 mg/dL Final     Creatinine   Date Value Ref Range Status   06/13/2023 0.88 0.60 - 1.30 mg/dL Final     Comment:     Standardized to IDMS reference method   10/14/2022 0.57 (L) 0.60 - 1.30 mg/dL Final     Comment:     Standardized to IDMS reference method   07/18/2022 0.75 0.60 - 1.30 mg/dL Final     Comment:     Standardized to IDMS reference method     Glucose   Date Value Ref Range Status   07/18/2022 82 65 - 140 mg/dL Final     Comment:     If the patient is fasting, the ADA then defines impaired fasting glucose as > 100 mg/dL and diabetes as > or equal to 123 mg/dL. Specimen collection should occur prior to Sulfasalazine administration due to the potential for falsely depressed results. Specimen collection should occur prior to Sulfapyridine administration due to the potential for falsely elevated results. 06/14/2022 91 65 - 140 mg/dL Final     Comment:     If the patient is fasting, the ADA then defines impaired fasting glucose as > 100 mg/dL and diabetes as > or equal to 123 mg/dL. Specimen collection should occur prior to Sulfasalazine administration due to the potential for falsely depressed results.  Specimen collection should occur prior to Sulfapyridine administration due to the potential for falsely elevated results. 02/02/2022 122 65 - 140 mg/dL Final     Comment:     If the patient is fasting, the ADA then defines impaired fasting glucose as > 100 mg/dL and diabetes as > or equal to 123 mg/dL. Specimen collection should occur prior to Sulfasalazine administration due to the potential for falsely depressed results. Specimen collection should occur prior to Sulfapyridine administration due to the potential for falsely elevated results. Calcium   Date Value Ref Range Status   06/13/2023 9.2 8.3 - 10.1 mg/dL Final   10/14/2022 8.8 8.3 - 10.1 mg/dL Final   07/18/2022 9.0 8.3 - 10.1 mg/dL Final     Albumin   Date Value Ref Range Status   06/13/2023 3.7 3.5 - 5.0 g/dL Final   10/14/2022 2.8 (L) 3.5 - 5.0 g/dL Final   07/18/2022 3.7 3.5 - 5.0 g/dL Final     Total Bilirubin   Date Value Ref Range Status   06/13/2023 0.85 0.20 - 1.00 mg/dL Final     Comment:     Use of this assay is not recommended for patients undergoing treatment with eltrombopag due to the potential for falsely elevated results. 10/14/2022 1.18 (H) 0.20 - 1.00 mg/dL Final     Comment:     Use of this assay is not recommended for patients undergoing treatment with eltrombopag due to the potential for falsely elevated results. 07/18/2022 1.32 (H) 0.20 - 1.00 mg/dL Final     Comment:     Use of this assay is not recommended for patients undergoing treatment with eltrombopag due to the potential for falsely elevated results. Alkaline Phosphatase   Date Value Ref Range Status   06/13/2023 106 46 - 116 U/L Final   10/14/2022 56 46 - 116 U/L Final   07/18/2022 66 46 - 116 U/L Final     AST   Date Value Ref Range Status   06/13/2023 23 5 - 45 U/L Final     Comment:     Specimen collection should occur prior to Sulfasalazine administration due to the potential for falsely depressed results.     10/14/2022 9 5 - 45 U/L Final     Comment:     Specimen collection should occur prior to Sulfasalazine administration due to the potential for falsely depressed results. 2022 14 5 - 45 U/L Final     Comment:     Specimen collection should occur prior to Sulfasalazine administration due to the potential for falsely depressed results. ALT   Date Value Ref Range Status   2023 32 12 - 78 U/L Final     Comment:     Specimen collection should occur prior to Sulfasalazine and/or Sulfapyridine administration due to the potential for falsely depressed results. 10/14/2022 13 12 - 78 U/L Final     Comment:     Specimen collection should occur prior to Sulfasalazine and/or Sulfapyridine administration due to the potential for falsely depressed results. 2022 15 12 - 78 U/L Final     Comment:     Specimen collection should occur prior to Sulfasalazine administration due to the potential for falsely depressed results. LD   Date Value Ref Range Status   2023 176 81 - 234 U/L Final   10/14/2022 179 81 - 234 U/L Final   2022 155 81 - 234 U/L Final     No results found for: "TSH"  No results found for: "P4XZMHM"   Free T4   Date Value Ref Range Status   2023 0.84 0.61 - 1.12 ng/dL Final     Comment:     Specimens with biotin concentrations > 10 ng/mL can lead to significant (> 10%) positive bias in result.   2023 0.87 0.61 - 1.12 ng/dL Final     Comment:     Specimens with biotin concentrations > 10 ng/mL can lead to significant (> 10%) positive bias in result. 2023 1.34 0.76 - 1.46 ng/dL Final     Comment:     Specimen collection should occur prior to Sulfasalazine administration due to the potential for falsely elevated results. RECENT IMAGIN2023 CT Neck  Grossly stable exam. No cervical lymphadenopathy by size criteria. 2023  CT Chest, Abdomen, and Pelvis  No evidence of metastatic disease in the chest abdomen or pelvis. Assessment/Plan  Ms. Rivera is a 29-year-old female with stage IIb melanoma status post 6 cycles of adjuvant pembrolizumab now here for continued monitoring, follow-up, and surveillance. 1. Malignant melanoma of shoulder, right ([address])  We discussed that imaging is negative for melanoma recurrence or metastasis. Labs reviewed and okay. She will continue to follow with dermatology. She will continue to follow with us. She is approximately 2 years out from diagnosis and we will continue to follow her every 3 months with physical exam blood work for 1 more year, up to 3 years out from diagnosis. After that we will monitor every 6 months until 5 years out from diagnosis. Labs placed and prescription provided for blood work for return in 3 months. She knows to call with issues or concerns prior to her next visit. - CBC and differential; Future  - Comprehensive metabolic panel; Future  - LD,Blood; Future  - TSH, 3rd generation; Future  - T3, free; Future  - T4, free; Future    2. High risk medications (not anticoagulants) long-term use  3. Immunotherapy  4. Hypothyroidism, unspecified type  She is on treatment with immunotherapy and we will continue to monitor for side effects and lab abnormalities. We will monitor labs with each visit as delayed onset of immune mediated side effects can occur for up to at least 1 year after treatment. She knows to watch for signs and symptoms for immune mediated side effects. Denies any immune mediated side effects at this time. Continue to monitor TSH and it has been monitored and adjusted by endocrinology especially during her pregnancy. - CBC and differential; Future  - Comprehensive metabolic panel; Future  - LD,Blood; Future  - TSH, 3rd generation; Future  - T3, free; Future  - T4, free; Future        Return in about 3 months (around 10/3/2023) for Office Visit, labs.      Danelle Joe MD, PhD

## 2023-07-14 DIAGNOSIS — E03.2 HYPOTHYROIDISM DUE TO MEDICATION: ICD-10-CM

## 2023-07-14 RX ORDER — LEVOTHYROXINE SODIUM 88 UG/1
TABLET ORAL
Qty: 90 TABLET | Refills: 0 | Status: SHIPPED | OUTPATIENT
Start: 2023-07-14

## 2023-07-25 ENCOUNTER — OFFICE VISIT (OUTPATIENT)
Dept: FAMILY MEDICINE CLINIC | Facility: CLINIC | Age: 33
End: 2023-07-25
Payer: COMMERCIAL

## 2023-07-25 VITALS
HEIGHT: 67 IN | DIASTOLIC BLOOD PRESSURE: 64 MMHG | OXYGEN SATURATION: 99 % | WEIGHT: 162 LBS | HEART RATE: 54 BPM | TEMPERATURE: 98 F | BODY MASS INDEX: 25.43 KG/M2 | SYSTOLIC BLOOD PRESSURE: 115 MMHG

## 2023-07-25 DIAGNOSIS — L23.7 POISON IVY DERMATITIS: ICD-10-CM

## 2023-07-25 DIAGNOSIS — Z11.59 NEED FOR HEPATITIS C SCREENING TEST: ICD-10-CM

## 2023-07-25 DIAGNOSIS — Z00.00 WELL ADULT EXAM: Primary | ICD-10-CM

## 2023-07-25 DIAGNOSIS — Z11.4 SCREENING FOR HIV (HUMAN IMMUNODEFICIENCY VIRUS): ICD-10-CM

## 2023-07-25 PROCEDURE — 99395 PREV VISIT EST AGE 18-39: CPT | Performed by: FAMILY MEDICINE

## 2023-07-25 RX ORDER — BETAMETHASONE DIPROPIONATE 0.5 MG/G
CREAM TOPICAL 2 TIMES DAILY
Qty: 30 G | Refills: 0 | Status: SHIPPED | OUTPATIENT
Start: 2023-07-25

## 2023-07-25 NOTE — PROGRESS NOTES
Assessment/Plan: Anticipatory guidance provided. Regular follow-up with dermatology and OB/GYN recommended. Recommend annual lab testing. 1. Well adult exam    2. Need for hepatitis C screening test  -     Hepatitis C Antibody; Future    3. Screening for HIV (human immunodeficiency virus)  -     HIV 1/2 AG/AB w Reflex [facility] for 2 yr old and above; Future    4. Poison ivy dermatitis  -     betamethasone dipropionate (DIPROSONE) 0.05 % cream; Apply topically 2 (two) times a day          Subjective:      Patient ID: Sam Ames is a 29 y.o. female. Patient here for well check but does have was an ivy rash to the arms. She continues to follow with dermatology for surveillance. Poison Ara             The following portions of the patient's history were reviewed and updated as appropriate: allergies, current medications, past family history, past medical history, past social history, past surgical history, and problem list.    Review of Systems      Objective:      /64 (BP Location: Right arm, Patient Position: Sitting, Cuff Size: Standard)   Pulse (!) 54   Temp 98 °F (36.7 °C) (Tympanic)   Ht 5' 7" (1.702 m)   Wt 73.5 kg (162 lb)   SpO2 99%   BMI 25.37 kg/m²          Physical Exam  Vitals reviewed. Constitutional:       Appearance: She is well-developed. HENT:      Head: Normocephalic and atraumatic. Right Ear: External ear normal. Tympanic membrane is not erythematous or bulging. Left Ear: External ear normal. Tympanic membrane is not erythematous or bulging. Nose: Nose normal.      Mouth/Throat:      Mouth: No oral lesions. Pharynx: No oropharyngeal exudate. Eyes:      General: No scleral icterus. Right eye: No discharge. Left eye: No discharge. Conjunctiva/sclera: Conjunctivae normal.   Neck:      Thyroid: No thyromegaly. Cardiovascular:      Rate and Rhythm: Normal rate and regular rhythm. Heart sounds: Normal heart sounds.  No murmur heard.     No friction rub. No gallop. Pulmonary:      Effort: Pulmonary effort is normal. No respiratory distress. Breath sounds: No wheezing or rales. Chest:      Chest wall: No tenderness. Abdominal:      General: Bowel sounds are normal. There is no distension. Palpations: Abdomen is soft. There is no mass. Tenderness: There is no abdominal tenderness. There is no guarding or rebound. Musculoskeletal:         General: No tenderness or deformity. Normal range of motion. Cervical back: Normal range of motion and neck supple. Lymphadenopathy:      Cervical: No cervical adenopathy. Skin:     General: Skin is warm and dry. Coloration: Skin is not pale. Findings: No erythema or rash (Maculopapular rash to the bilateral arms consistent with contact dermatitis from poison ivy). Neurological:      Mental Status: She is alert and oriented to person, place, and time. Cranial Nerves: No cranial nerve deficit. Motor: No abnormal muscle tone. Coordination: Coordination normal.      Deep Tendon Reflexes: Reflexes are normal and symmetric.    Psychiatric:         Behavior: Behavior normal.

## 2023-07-31 ENCOUNTER — APPOINTMENT (OUTPATIENT)
Dept: LAB | Facility: MEDICAL CENTER | Age: 33
End: 2023-07-31
Payer: COMMERCIAL

## 2023-07-31 DIAGNOSIS — E03.2 HYPOTHYROIDISM DUE TO MEDICATION: ICD-10-CM

## 2023-07-31 LAB
T4 FREE SERPL-MCNC: 0.84 NG/DL (ref 0.61–1.12)
TSH SERPL DL<=0.05 MIU/L-ACNC: 2.45 UIU/ML (ref 0.45–4.5)

## 2023-07-31 PROCEDURE — 84439 ASSAY OF FREE THYROXINE: CPT

## 2023-07-31 PROCEDURE — 36415 COLL VENOUS BLD VENIPUNCTURE: CPT

## 2023-07-31 PROCEDURE — 84443 ASSAY THYROID STIM HORMONE: CPT

## 2023-08-07 NOTE — PROGRESS NOTES
[facility]  [address]  [place] [address] [phone]     Date of Visit: 7/3/2023  Name: Evin Cronin   YOB: 1990        Subjective    VISIT DIAGNOSIS:  Diagnoses and all orders for this visit:    Malignant melanoma of shoulder, right ([address])  -     CBC and differential; Future  -     Comprehensive metabolic panel; Future  -     LD,Blood; Future  -     TSH, 3rd generation; Future  -     T3, free; Future  -     T4, free; Future    High risk medications (not anticoagulants) long-term use  -     CBC and differential; Future  -     Comprehensive metabolic panel; Future  -     LD,Blood; Future  -     TSH, 3rd generation; Future  -     T3, free; Future  -     T4, free;  Future    Immunotherapy    Hypothyroidism, unspecified type        Oncology History   Malignant melanoma of shoulder, right ([address])   7/8/2021 Biopsy    Right anterior shoulder:  - Ulcerated malignant melanoma, 2.5mm    Ulceration: Yes  Mitosis: >1    NGS Testing  MS-Stable  TMB - 13Muts/Mb  CDKN2A E10  TERT promoter -124C>T       7/8/2021 Genomic Testing    DecisionDx - Class 2B     7/8/2021 -  Cancer Staged    Staging form: Melanoma of the Skin, AJCC 8th Edition  - Pathologic stage from 7/8/2021: Stage IIB (pT3b, pN0, cM0) - Signed by Jessica Galvan MD on 9/20/2021  Stage prefix: Initial diagnosis       8/18/2021 Surgery    Wide excision melanoma with sentinel lymph node biopsy  No residual melanoma  LNs negative - 0/9 LNs     11/17/2021 - 6/16/2022 Chemotherapy    pembrolizumab (KEYTRUDA) IVPB, 400 mg, Intravenous, Once, 6 of 9 cycles  Administration: 400 mg (11/17/2021), 400 mg (12/29/2021), 400 mg (2/7/2022), 400 mg (3/21/2022), 400 mg (5/2/2022), 400 mg (6/16/2022)        Cancer Staging   Malignant melanoma of shoulder, right (HCC)  Staging form: Melanoma of the Skin, AJCC 8th Edition  - Pathologic stage from 7/8/2021: Stage IIB (pT3b, pN0, cM0) - Signed by Shaheen Bonilla Cadence Barr MD on 9/20/2021     Treatment Details   Treatment goal Curative   Plan Name OP Pembrolizumab Q 42 Days   Status Inactive   Start Date 11/17/2021   End Date 6/16/2022   Provider Nataliya Brown MD   Chemotherapy pembrolizumab [facility]) IVPB, 400 mg, Intravenous, Once, 6 of 9 cycles  Administration: 400 mg (11/17/2021), 400 mg (12/29/2021), 400 mg (2/7/2022), 400 mg (3/21/2022), 400 mg (5/2/2022), 400 mg (6/16/2022)          HISTORY OF PRESENT ILLNESS: Saima Madsen is a 29 y.o. female who has stage IIb melanoma who received adjuvant pembrolizumab for 6 cycles before coming pregnant here for continued monitoring, follow-up, and surveillance. He delivered her daughter approximately 3 months ago. She is doing well and feels good. Energy is as expected. Doing well. Denies any new, changing, concerning skin lesions. Denies any lymphadenopathy. Continues to follow with dermatology. Denies any immune mediated side effects. Denies headaches, double vision, rash, itching, chest pain, shortness of breath, and diarrhea. Denies muscle weakness and fatigue. She underwent CT neck, chest, abdomen, and pelvis on 6/26/2023. We discussed that CT neck demonstrates grossly stable exam with no concerning cervical lymphadenopathy and there is no evidence of melanoma recurrence or metastasis on CT chest, abdomen, and pelvis. REVIEW OF SYSTEMS:  Review of Systems   Constitutional: Negative for appetite change, fatigue, fever and unexpected weight change. HENT:   Negative for lump/mass, trouble swallowing and voice change. Eyes: Negative for icterus. Respiratory: Negative for cough, shortness of breath and wheezing. Cardiovascular: Negative for leg swelling. Gastrointestinal: Negative for abdominal pain, constipation, diarrhea, nausea and vomiting. Genitourinary: Negative for difficulty urinating and hematuria. Musculoskeletal: Negative for arthralgias, gait problem and myalgias.    Skin: Negative for itching and rash. No new, changing, or concerning lesions. Neurological: Negative for extremity weakness, gait problem, headaches, light-headedness and numbness. Hematological: Negative for adenopathy. MEDICATIONS:    Current Outpatient Medications:   •  clindamycin (CLEOCIN T) 1 % lotion, Apply to face, Disp: , Rfl:   •  docusate sodium (COLACE) 250 MG capsule, Take 250 mg by mouth daily Patient reports she is taking off brand colace (not sure of the exact name) PRN (Patient not taking: Reported on 7/25/2023), Disp: , Rfl:   •  Prenatal Vit-Fe Fumarate-FA (PRENATAL VITAMIN PO), Take 1 tablet by mouth in the morning, Disp: , Rfl:   •  betamethasone dipropionate (DIPROSONE) 0.05 % cream, Apply topically 2 (two) times a day, Disp: 30 g, Rfl: 0  •  levothyroxine 88 mcg tablet, TAKE 1 TABLET BY MOUTH EVERY DAY, Disp: 90 tablet, Rfl: 0     ALLERGIES:  Allergies   Allergen Reactions   • Strawberry Extract - Food Allergy Swelling        ACTIVE PROBLEMS:  Patient Active Problem List   Diagnosis   • ADD (attention deficit disorder) without hyperactivity   • Malignant melanoma of shoulder, right (HCC)   • Family history of breast cancer   • High risk medications (not anticoagulants) long-term use   • Immunotherapy   • Hypothyroidism   • First trimester bleeding   • Cancer complicating pregnancy in second trimester   • Abnormal MSAFP (maternal serum alpha-fetoprotein), elevated   • Thyroid disease affecting pregnancy          PAST MEDICAL HISTORY:   Past Medical History:   Diagnosis Date   • Cancer ([address])     Skin cancer   • Immunotherapy 11/2021   • Lymphoma ([address])    • Melanoma ([address]) 2021        PAST SURGICAL HISTORY:  Past Surgical History:   Procedure Laterality Date   • LYMPH NODE BIOPSY Right 8/18/2021    Procedure: BIOPSY LYMPH NODE SENTINEL, INTRAOPERATIVE . LYMPHATIC MAPPING, LYMPHOSCINTIGRAPHY (NUC MED INJECT AT 0730);   Surgeon: Helene Mckeon MD;  Location: [facility];  Service: Surgical Oncology   • SKIN LESION EXCISION Right 2021    Procedure: EXCISION WIDE LESION UPPER EXTREMITY;  Surgeon: Waqar Nunez MD;  Location: [facility];  Service: Surgical Oncology   • SPLIT THICKNESS SKIN GRAFT Right 2021    Procedure: RIGHT SHOULDER  COMPLEX CLOSURE;  Surgeon: Nikolas Brumfield MD;  Location: [facility];  Service: Plastics   • WISDOM TOOTH EXTRACTION          SOCIAL HISTORY:  Social History     Socioeconomic History   • Marital status: Single     Spouse name: None   • Number of children: None   • Years of education: None   • Highest education level: None   Occupational History   • None   Tobacco Use   • Smoking status: Former     Packs/day: 0.25     Years: 1.00     Total pack years: 0.25     Types: Cigarettes     Quit date:      Years since quittin.5   • Smokeless tobacco: Never   • Tobacco comments:     about 2-3 years ago    Vaping Use   • Vaping Use: Never used   Substance and Sexual Activity   • Alcohol use: Yes     Alcohol/week: 1.0 standard drink of alcohol     Types: 1 Cans of beer per week     Comment: rare   • Drug use: No   • Sexual activity: Yes     Partners: Male   Other Topics Concern   • None   Social History Narrative    Denied history of daily coffee consumption      Social Determinants of Health     Financial Resource Strain: Not on file   Food Insecurity: Not on file   Transportation Needs: Not on file   Physical Activity: Not on file   Stress: Not on file   Social Connections: Not on file   Intimate Partner Violence: Not on file   Housing Stability: Not on file        FAMILY HISTORY:  Family History   Problem Relation Age of Onset   • No Known Problems Mother    • Breast cancer Maternal Grandmother    • Breast cancer Maternal Aunt    • Hypothyroidism Maternal Uncle            Objective    PHYSICAL EXAMINATION:   Blood pressure 110/78, pulse 55, temperature (!) 97.3 °F (36.3 °C), temperature source Temporal, resp.  rate 16, height 5' 7" (1.702 m), weight 73.5 kg (162 lb), SpO2 98 %, unknown if currently breastfeeding. Pain Score: 0-No pain     ECOG Performance Status    Flowsheet Row Most Recent Value   ECOG Performance Status 0 - Fully active, able to carry on all pre-disease performance without restriction             Physical Exam  Constitutional:       General: She is not in acute distress. Appearance: Normal appearance. She is not toxic-appearing. HENT:      Head: Normocephalic and atraumatic. Right Ear: External ear normal.      Left Ear: External ear normal.      Nose: Nose normal.      Mouth/Throat:      Mouth: Mucous membranes are moist.      Pharynx: Oropharynx is clear. Eyes:      General: No scleral icterus. Right eye: No discharge. Left eye: No discharge. Conjunctiva/sclera: Conjunctivae normal.   Cardiovascular:      Rate and Rhythm: Normal rate and regular rhythm. Pulses: Normal pulses. Heart sounds: No murmur heard. No friction rub. No gallop. Pulmonary:      Effort: Pulmonary effort is normal. No respiratory distress. Breath sounds: Normal breath sounds. No wheezing or rales. Abdominal:      General: Bowel sounds are normal. There is no distension. Palpations: There is no mass. Tenderness: There is no abdominal tenderness. There is no rebound. Musculoskeletal:         General: No swelling or tenderness. Cervical back: Normal range of motion. No rigidity. Right lower leg: No edema. Left lower leg: No edema. Lymphadenopathy:      Head:      Right side of head: No submandibular, preauricular or posterior auricular adenopathy. Left side of head: No submandibular, preauricular or posterior auricular adenopathy. Cervical: No cervical adenopathy. Right cervical: No superficial or posterior cervical adenopathy. Left cervical: No superficial or posterior cervical adenopathy.       Upper Body:      Right upper body: No supraclavicular or axillary adenopathy. Left upper body: No supraclavicular or axillary adenopathy. Lower Body: No right inguinal adenopathy. No left inguinal adenopathy. Skin:     General: Skin is warm. Coloration: Skin is not jaundiced. Findings: No rash. Comments: Well healed surgical scar. No evidence of recurrence at primary site. Neurological:      General: No focal deficit present. Mental Status: She is alert and oriented to person, place, and time. Cranial Nerves: No cranial nerve deficit. Motor: No weakness. Gait: Gait normal.   Psychiatric:         Mood and Affect: Mood normal.         Behavior: Behavior normal.         Thought Content: Thought content normal.         Judgment: Judgment normal.         I reviewed lab data in the chart.     WBC   Date Value Ref Range Status   06/13/2023 8.66 4.31 - 10.16 Thousand/uL Final   10/14/2022 7.68 4.31 - 10.16 Thousand/uL Final   07/18/2022 8.44 4.31 - 10.16 Thousand/uL Final     Hemoglobin   Date Value Ref Range Status   06/13/2023 14.2 11.5 - 15.4 g/dL Final   10/14/2022 12.2 11.5 - 15.4 g/dL Final   07/18/2022 13.7 11.5 - 15.4 g/dL Final     Platelets   Date Value Ref Range Status   06/13/2023 311 149 - 390 Thousands/uL Final   10/14/2022 254 149 - 390 Thousands/uL Final   07/18/2022 273 149 - 390 Thousands/uL Final     MCV   Date Value Ref Range Status   06/13/2023 87 82 - 98 fL Final   10/14/2022 88 82 - 98 fL Final   07/18/2022 88 82 - 98 fL Final      Potassium   Date Value Ref Range Status   06/13/2023 4.5 3.5 - 5.3 mmol/L Final   10/14/2022 4.3 3.5 - 5.3 mmol/L Final   07/18/2022 5.0 3.5 - 5.3 mmol/L Final     Chloride   Date Value Ref Range Status   06/13/2023 111 (H) 96 - 108 mmol/L Final   10/14/2022 109 (H) 96 - 108 mmol/L Final   07/18/2022 105 96 - 108 mmol/L Final     CO2   Date Value Ref Range Status   06/13/2023 28 21 - 32 mmol/L Final   10/14/2022 23 21 - 32 mmol/L Final   07/18/2022 29 21 - 32 mmol/L Final     BUN   Date Value Ref Range Status   06/13/2023 13 5 - 25 mg/dL Final   10/14/2022 9 5 - 25 mg/dL Final   07/18/2022 11 5 - 25 mg/dL Final     Creatinine   Date Value Ref Range Status   06/13/2023 0.88 0.60 - 1.30 mg/dL Final     Comment:     Standardized to IDMS reference method   10/14/2022 0.57 (L) 0.60 - 1.30 mg/dL Final     Comment:     Standardized to IDMS reference method   07/18/2022 0.75 0.60 - 1.30 mg/dL Final     Comment:     Standardized to IDMS reference method     Glucose   Date Value Ref Range Status   07/18/2022 82 65 - 140 mg/dL Final     Comment:     If the patient is fasting, the ADA then defines impaired fasting glucose as > 100 mg/dL and diabetes as > or equal to 123 mg/dL. Specimen collection should occur prior to Sulfasalazine administration due to the potential for falsely depressed results. Specimen collection should occur prior to Sulfapyridine administration due to the potential for falsely elevated results. 06/14/2022 91 65 - 140 mg/dL Final     Comment:     If the patient is fasting, the ADA then defines impaired fasting glucose as > 100 mg/dL and diabetes as > or equal to 123 mg/dL. Specimen collection should occur prior to Sulfasalazine administration due to the potential for falsely depressed results. Specimen collection should occur prior to Sulfapyridine administration due to the potential for falsely elevated results. 02/02/2022 122 65 - 140 mg/dL Final     Comment:     If the patient is fasting, the ADA then defines impaired fasting glucose as > 100 mg/dL and diabetes as > or equal to 123 mg/dL. Specimen collection should occur prior to Sulfasalazine administration due to the potential for falsely depressed results. Specimen collection should occur prior to Sulfapyridine administration due to the potential for falsely elevated results.      Calcium   Date Value Ref Range Status   06/13/2023 9.2 8.3 - 10.1 mg/dL Final   10/14/2022 8.8 8.3 - 10.1 mg/dL Final   07/18/2022 9.0 8.3 - 10.1 mg/dL Final     Albumin   Date Value Ref Range Status   06/13/2023 3.7 3.5 - 5.0 g/dL Final   10/14/2022 2.8 (L) 3.5 - 5.0 g/dL Final   07/18/2022 3.7 3.5 - 5.0 g/dL Final     Total Bilirubin   Date Value Ref Range Status   06/13/2023 0.85 0.20 - 1.00 mg/dL Final     Comment:     Use of this assay is not recommended for patients undergoing treatment with eltrombopag due to the potential for falsely elevated results. 10/14/2022 1.18 (H) 0.20 - 1.00 mg/dL Final     Comment:     Use of this assay is not recommended for patients undergoing treatment with eltrombopag due to the potential for falsely elevated results. 07/18/2022 1.32 (H) 0.20 - 1.00 mg/dL Final     Comment:     Use of this assay is not recommended for patients undergoing treatment with eltrombopag due to the potential for falsely elevated results. Alkaline Phosphatase   Date Value Ref Range Status   06/13/2023 106 46 - 116 U/L Final   10/14/2022 56 46 - 116 U/L Final   07/18/2022 66 46 - 116 U/L Final     AST   Date Value Ref Range Status   06/13/2023 23 5 - 45 U/L Final     Comment:     Specimen collection should occur prior to Sulfasalazine administration due to the potential for falsely depressed results. 10/14/2022 9 5 - 45 U/L Final     Comment:     Specimen collection should occur prior to Sulfasalazine administration due to the potential for falsely depressed results. 07/18/2022 14 5 - 45 U/L Final     Comment:     Specimen collection should occur prior to Sulfasalazine administration due to the potential for falsely depressed results. ALT   Date Value Ref Range Status   06/13/2023 32 12 - 78 U/L Final     Comment:     Specimen collection should occur prior to Sulfasalazine and/or Sulfapyridine administration due to the potential for falsely depressed results.     10/14/2022 13 12 - 78 U/L Final     Comment:     Specimen collection should occur prior to Sulfasalazine and/or Sulfapyridine administration due to the potential for falsely depressed results. 2022 15 12 - 78 U/L Final     Comment:     Specimen collection should occur prior to Sulfasalazine administration due to the potential for falsely depressed results. LD   Date Value Ref Range Status   2023 176 81 - 234 U/L Final   10/14/2022 179 81 - 234 U/L Final   2022 155 81 - 234 U/L Final     No results found for: "TSH"  No results found for: "V6DBDVC"   Free T4   Date Value Ref Range Status   2023 0.84 0.61 - 1.12 ng/dL Final     Comment:     Specimens with biotin concentrations > 10 ng/mL can lead to significant (> 10%) positive bias in result.   2023 0.87 0.61 - 1.12 ng/dL Final     Comment:     Specimens with biotin concentrations > 10 ng/mL can lead to significant (> 10%) positive bias in result. 2023 1.34 0.76 - 1.46 ng/dL Final     Comment:     Specimen collection should occur prior to Sulfasalazine administration due to the potential for falsely elevated results. RECENT IMAGIN2023 CT Neck  Grossly stable exam. No cervical lymphadenopathy by size criteria. 2023  CT Chest, Abdomen, and Pelvis  No evidence of metastatic disease in the chest abdomen or pelvis. Assessment/Plan  Ms. Rivera is a 56-year-old female with stage IIb melanoma status post 6 cycles of adjuvant pembrolizumab now here for continued monitoring, follow-up, and surveillance. 1. Malignant melanoma of shoulder, right ([address])  We discussed that imaging is negative for melanoma recurrence or metastasis. Labs reviewed and okay. She will continue to follow with dermatology. She will continue to follow with us. She is approximately 2 years out from diagnosis and we will continue to follow her every 3 months with physical exam blood work for 1 more year, up to 3 years out from diagnosis. After that we will monitor every 6 months until 5 years out from diagnosis.     Labs placed and prescription provided for blood work for return in 3 months. She knows to call with issues or concerns prior to her next visit. - CBC and differential; Future  - Comprehensive metabolic panel; Future  - LD,Blood; Future  - TSH, 3rd generation; Future  - T3, free; Future  - T4, free; Future    2. High risk medications (not anticoagulants) long-term use  3. Immunotherapy  4. Hypothyroidism, unspecified type  She is on treatment with immunotherapy and we will continue to monitor for side effects and lab abnormalities. We will monitor labs with each visit as delayed onset of immune mediated side effects can occur for up to at least 1 year after treatment. She knows to watch for signs and symptoms for immune mediated side effects. Denies any immune mediated side effects at this time. Continue to monitor TSH and it has been monitored and adjusted by endocrinology especially during her pregnancy. - CBC and differential; Future  - Comprehensive metabolic panel; Future  - LD,Blood; Future  - TSH, 3rd generation; Future  - T3, free; Future  - T4, free; Future        Return in about 3 months (around 10/3/2023) for Office Visit, labs.      Dae Forman MD, PhD

## 2023-09-28 ENCOUNTER — TELEPHONE (OUTPATIENT)
Dept: HEMATOLOGY ONCOLOGY | Facility: CLINIC | Age: 33
End: 2023-09-28

## 2023-09-29 ENCOUNTER — TELEPHONE (OUTPATIENT)
Dept: HEMATOLOGY ONCOLOGY | Facility: CLINIC | Age: 33
End: 2023-09-29

## 2023-09-29 ENCOUNTER — APPOINTMENT (OUTPATIENT)
Dept: LAB | Facility: MEDICAL CENTER | Age: 33
End: 2023-09-29
Payer: COMMERCIAL

## 2023-09-29 DIAGNOSIS — Z11.4 SCREENING FOR HIV (HUMAN IMMUNODEFICIENCY VIRUS): ICD-10-CM

## 2023-09-29 DIAGNOSIS — C43.61 MALIGNANT MELANOMA OF SHOULDER, RIGHT (HCC): ICD-10-CM

## 2023-09-29 DIAGNOSIS — Z79.899 HIGH RISK MEDICATIONS (NOT ANTICOAGULANTS) LONG-TERM USE: ICD-10-CM

## 2023-09-29 DIAGNOSIS — Z11.59 NEED FOR HEPATITIS C SCREENING TEST: ICD-10-CM

## 2023-09-29 LAB
ALBUMIN SERPL BCP-MCNC: 4.4 G/DL (ref 3.5–5)
ALP SERPL-CCNC: 85 U/L (ref 34–104)
ALT SERPL W P-5'-P-CCNC: 12 U/L (ref 7–52)
ANION GAP SERPL CALCULATED.3IONS-SCNC: 6 MMOL/L
AST SERPL W P-5'-P-CCNC: 20 U/L (ref 13–39)
BASOPHILS # BLD AUTO: 0.04 THOUSANDS/ÂΜL (ref 0–0.1)
BASOPHILS NFR BLD AUTO: 1 % (ref 0–1)
BILIRUB SERPL-MCNC: 1.33 MG/DL (ref 0.2–1)
BUN SERPL-MCNC: 13 MG/DL (ref 5–25)
CALCIUM SERPL-MCNC: 9.5 MG/DL (ref 8.4–10.2)
CHLORIDE SERPL-SCNC: 107 MMOL/L (ref 96–108)
CO2 SERPL-SCNC: 29 MMOL/L (ref 21–32)
CREAT SERPL-MCNC: 0.98 MG/DL (ref 0.6–1.3)
EOSINOPHIL # BLD AUTO: 0.12 THOUSAND/ÂΜL (ref 0–0.61)
EOSINOPHIL NFR BLD AUTO: 2 % (ref 0–6)
ERYTHROCYTE [DISTWIDTH] IN BLOOD BY AUTOMATED COUNT: 11.9 % (ref 11.6–15.1)
GFR SERPL CREATININE-BSD FRML MDRD: 77 ML/MIN/1.73SQ M
GLUCOSE P FAST SERPL-MCNC: 62 MG/DL (ref 65–99)
HCT VFR BLD AUTO: 43.2 % (ref 34.8–46.1)
HCV AB SER QL: NORMAL
HGB BLD-MCNC: 14.4 G/DL (ref 11.5–15.4)
HIV 1+2 AB+HIV1 P24 AG SERPL QL IA: NORMAL
HIV 2 AB SERPL QL IA: NORMAL
HIV1 AB SERPL QL IA: NORMAL
HIV1 P24 AG SERPL QL IA: NORMAL
IMM GRANULOCYTES # BLD AUTO: 0.01 THOUSAND/UL (ref 0–0.2)
IMM GRANULOCYTES NFR BLD AUTO: 0 % (ref 0–2)
LDH SERPL-CCNC: 168 U/L (ref 140–271)
LYMPHOCYTES # BLD AUTO: 2.28 THOUSANDS/ÂΜL (ref 0.6–4.47)
LYMPHOCYTES NFR BLD AUTO: 43 % (ref 14–44)
MCH RBC QN AUTO: 30.9 PG (ref 26.8–34.3)
MCHC RBC AUTO-ENTMCNC: 33.3 G/DL (ref 31.4–37.4)
MCV RBC AUTO: 93 FL (ref 82–98)
MONOCYTES # BLD AUTO: 0.35 THOUSAND/ÂΜL (ref 0.17–1.22)
MONOCYTES NFR BLD AUTO: 7 % (ref 4–12)
NEUTROPHILS # BLD AUTO: 2.53 THOUSANDS/ÂΜL (ref 1.85–7.62)
NEUTS SEG NFR BLD AUTO: 47 % (ref 43–75)
NRBC BLD AUTO-RTO: 0 /100 WBCS
PLATELET # BLD AUTO: 285 THOUSANDS/UL (ref 149–390)
PMV BLD AUTO: 9.6 FL (ref 8.9–12.7)
POTASSIUM SERPL-SCNC: 4.4 MMOL/L (ref 3.5–5.3)
PROT SERPL-MCNC: 7 G/DL (ref 6.4–8.4)
RBC # BLD AUTO: 4.66 MILLION/UL (ref 3.81–5.12)
SODIUM SERPL-SCNC: 142 MMOL/L (ref 135–147)
T3FREE SERPL-MCNC: 3.26 PG/ML (ref 2.5–3.9)
T4 FREE SERPL-MCNC: 0.93 NG/DL (ref 0.61–1.12)
TSH SERPL DL<=0.05 MIU/L-ACNC: 5.22 UIU/ML (ref 0.45–4.5)
WBC # BLD AUTO: 5.33 THOUSAND/UL (ref 4.31–10.16)

## 2023-09-29 PROCEDURE — 80053 COMPREHEN METABOLIC PANEL: CPT

## 2023-09-29 PROCEDURE — 84439 ASSAY OF FREE THYROXINE: CPT

## 2023-09-29 PROCEDURE — 84481 FREE ASSAY (FT-3): CPT

## 2023-09-29 PROCEDURE — 83615 LACTATE (LD) (LDH) ENZYME: CPT

## 2023-09-29 PROCEDURE — 85025 COMPLETE CBC W/AUTO DIFF WBC: CPT

## 2023-09-29 PROCEDURE — 36415 COLL VENOUS BLD VENIPUNCTURE: CPT

## 2023-09-29 PROCEDURE — 84443 ASSAY THYROID STIM HORMONE: CPT

## 2023-09-29 PROCEDURE — 86803 HEPATITIS C AB TEST: CPT

## 2023-09-29 PROCEDURE — 87389 HIV-1 AG W/HIV-1&-2 AB AG IA: CPT

## 2023-09-29 NOTE — TELEPHONE ENCOUNTER
Appointment Change  Cancel, Reschedule, Change to Virtual      Who are you speaking with? Patient   If it is not the patient, is the caller listed on the communication consent form? N/A   Which provider is the appointment scheduled with? Dr. Desirae Jovel   When was the original appointment scheduled? Please list date and time 10/2/23 8:00AM   At which location is the appointment scheduled to take place? [facility]   Was the appointment rescheduled? Was the appointment changed from an in person visit to a virtual visit? If so, please list the details of the change. 10/2/23 9:40AM   What is the reason for the appointment change? Patient needed a later time       Was [facility] transport scheduled? No   Does [facility] transport need to be scheduled for the new visit (if applicable) No   Does the patient need an infusion appointment rescheduled? No   Does the patient have an upcoming infusion appointment scheduled? If so, when? No   Is the patient undergoing chemotherapy? No   For appointments cancelled with less than 24 hours:  Was the no-show policy reviewed?  N/A

## 2023-10-02 ENCOUNTER — OFFICE VISIT (OUTPATIENT)
Dept: HEMATOLOGY ONCOLOGY | Facility: CLINIC | Age: 33
End: 2023-10-02
Payer: COMMERCIAL

## 2023-10-02 VITALS
HEIGHT: 67 IN | RESPIRATION RATE: 16 BRPM | BODY MASS INDEX: 25.9 KG/M2 | SYSTOLIC BLOOD PRESSURE: 118 MMHG | DIASTOLIC BLOOD PRESSURE: 76 MMHG | OXYGEN SATURATION: 95 % | HEART RATE: 68 BPM | TEMPERATURE: 97.7 F | WEIGHT: 165 LBS

## 2023-10-02 DIAGNOSIS — Z29.89 IMMUNOTHERAPY: ICD-10-CM

## 2023-10-02 DIAGNOSIS — E03.9 HYPOTHYROIDISM, UNSPECIFIED TYPE: ICD-10-CM

## 2023-10-02 DIAGNOSIS — Z79.899 HIGH RISK MEDICATIONS (NOT ANTICOAGULANTS) LONG-TERM USE: ICD-10-CM

## 2023-10-02 DIAGNOSIS — Z08 ENCOUNTER FOR FOLLOW-UP SURVEILLANCE OF MELANOMA: ICD-10-CM

## 2023-10-02 DIAGNOSIS — Z85.820 ENCOUNTER FOR FOLLOW-UP SURVEILLANCE OF MELANOMA: ICD-10-CM

## 2023-10-02 DIAGNOSIS — C43.61 MALIGNANT MELANOMA OF SHOULDER, RIGHT (HCC): Primary | ICD-10-CM

## 2023-10-02 PROCEDURE — 99214 OFFICE O/P EST MOD 30 MIN: CPT | Performed by: INTERNAL MEDICINE

## 2023-10-02 RX ORDER — LEVOTHYROXINE SODIUM 0.1 MG/1
100 TABLET ORAL DAILY
Qty: 30 TABLET | Refills: 3 | Status: SHIPPED | OUTPATIENT
Start: 2023-10-02 | End: 2023-11-01

## 2023-10-02 NOTE — PROGRESS NOTES
[facility]  [address] [place] [id number] [phone]     Date of Visit: 10/2/2023  Name: Sujatha Kunz   YOB: 1990        Subjective    VISIT DIAGNOSIS:  Diagnoses and all orders for this visit:    Malignant melanoma of shoulder, right ([address])  -     CT chest abdomen pelvis w contrast; Future  -     CBC and differential; Future  -     Comprehensive metabolic panel; Future  -     LD,Blood; Future  -     T3, free; Future  -     T4, free; Future  -     TSH, 3rd generation; Future    Encounter for follow-up surveillance of melanoma    High risk medications (not anticoagulants) long-term use  -     CT chest abdomen pelvis w contrast; Future  -     CBC and differential; Future  -     Comprehensive metabolic panel; Future  -     LD,Blood; Future  -     T3, free; Future  -     T4, free; Future  -     TSH, 3rd generation; Future    Immunotherapy  -     CT chest abdomen pelvis w contrast; Future  -     CBC and differential; Future  -     Comprehensive metabolic panel; Future  -     LD,Blood; Future  -     T3, free; Future  -     T4, free; Future  -     TSH, 3rd generation; Future    Hypothyroidism, unspecified type  -     CT chest abdomen pelvis w contrast; Future  -     CBC and differential; Future  -     Comprehensive metabolic panel; Future  -     LD,Blood; Future  -     T3, free; Future  -     T4, free; Future  -     TSH, 3rd generation; Future  -     levothyroxine (Euthyrox) 100 mcg tablet;  Take 1 tablet (100 mcg total) by mouth daily        Oncology History   Malignant melanoma of shoulder, right ([address])   7/8/2021 Biopsy    Right anterior shoulder:  - Ulcerated malignant melanoma, 2.5mm    Ulceration: Yes  Mitosis: >1    NGS Testing  MS-Stable  TMB - 13Muts/Mb  CDKN2A E10  TERT promoter -124C>T       7/8/2021 Genomic Testing    DecisionDx - Class 2B     7/8/2021 -  Cancer Staged    Staging form: Melanoma of the Skin, AJCC 8th Edition  - Pathologic stage from 7/8/2021: Stage IIB (pT3b, pN0, cM0) - Signed by Erlinda Willett MD on 9/20/2021  Stage prefix: Initial diagnosis       8/18/2021 Surgery    Wide excision melanoma with sentinel lymph node biopsy  No residual melanoma  LNs negative - 0/9 LNs     11/17/2021 - 6/16/2022 Chemotherapy    pembrolizumab (KEYTRUDA) IVPB, 400 mg, Intravenous, Once, 6 of 9 cycles  Administration: 400 mg (11/17/2021), 400 mg (12/29/2021), 400 mg (2/7/2022), 400 mg (3/21/2022), 400 mg (5/2/2022), 400 mg (6/16/2022)        Cancer Staging   Malignant melanoma of shoulder, right ([address])  Staging form: Melanoma of the Skin, AJCC 8th Edition  - Pathologic stage from 7/8/2021: Stage IIB (pT3b, pN0, cM0) - Signed by Erlinda Willett MD on 9/20/2021     Treatment Details   Treatment goal Curative   Plan Name OP Pembrolizumab Q 42 Days   Status Inactive   Start Date 11/17/2021   End Date 6/16/2022   Provider Erlinda Willett MD   Chemotherapy pembrolizumab [facility]) IVPB, 400 mg, Intravenous, Once, 6 of 9 cycles  Administration: 400 mg (11/17/2021), 400 mg (12/29/2021), 400 mg (2/7/2022), 400 mg (3/21/2022), 400 mg (5/2/2022), 400 mg (6/16/2022)          HISTORY OF PRESENT ILLNESS: Alfredito Vasques is a 29 y.o. female  who has stage IIb melanoma who received adjuvant pembrolizumab for 6 cycles before coming pregnant here for continued monitoring, follow-up, and surveillance. Patient had a baby approximately 6 months ago and all is doing well on that end. She feels well overall and has no acute concerns today aside from a rising TSH level on her latest lab work. She said her energy level is good and she denies any nausea, vomiting, fevers, chills, recent sicknesses or illnesses or unexpected weight loss. Patient denies any new or changing skin lesions and does follow with dermatology routinely. Last CT scan in June revealed no evidence of recurrent or metastatic disease.         REVIEW OF SYSTEMS:  Review of Systems Constitutional: Negative for appetite change, fatigue, fever and unexpected weight change. HENT:   Negative for lump/mass, trouble swallowing and voice change. Eyes: Negative for icterus. Respiratory: Negative for cough, shortness of breath and wheezing. Cardiovascular: Negative for leg swelling. Gastrointestinal: Negative for abdominal pain, constipation, diarrhea, nausea and vomiting. Genitourinary: Negative for difficulty urinating and hematuria. Musculoskeletal: Negative for arthralgias, gait problem and myalgias. Skin: Negative for itching and rash. No new, changing, or concerning lesions. Neurological: Negative for extremity weakness, gait problem, headaches, light-headedness and numbness. Hematological: Negative for adenopathy.         MEDICATIONS:    Current Outpatient Medications:   •  betamethasone dipropionate (DIPROSONE) 0.05 % cream, Apply topically 2 (two) times a day, Disp: 30 g, Rfl: 0  •  clindamycin (CLEOCIN T) 1 % lotion, Apply to face, Disp: , Rfl:   •  levothyroxine (Euthyrox) 100 mcg tablet, Take 1 tablet (100 mcg total) by mouth daily, Disp: 30 tablet, Rfl: 3  •  levothyroxine 88 mcg tablet, TAKE 1 TABLET BY MOUTH EVERY DAY, Disp: 90 tablet, Rfl: 0  •  Prenatal Vit-Fe Fumarate-FA (PRENATAL VITAMIN PO), Take 1 tablet by mouth in the morning, Disp: , Rfl:   •  docusate sodium (COLACE) 250 MG capsule, Take 250 mg by mouth daily Patient reports she is taking off brand colace (not sure of the exact name) PRN (Patient not taking: Reported on 7/25/2023), Disp: , Rfl:      ALLERGIES:  Allergies   Allergen Reactions   • Strawberry Extract - Food Allergy Swelling        ACTIVE PROBLEMS:  Patient Active Problem List   Diagnosis   • ADD (attention deficit disorder) without hyperactivity   • Malignant melanoma of shoulder, right (HCC)   • Family history of breast cancer   • High risk medications (not anticoagulants) long-term use   • Immunotherapy   • Hypothyroidism   • First trimester bleeding   • Cancer complicating pregnancy in second trimester   • Abnormal MSAFP (maternal serum alpha-fetoprotein), elevated   • Thyroid disease affecting pregnancy          PAST MEDICAL HISTORY:   Past Medical History:   Diagnosis Date   • Cancer ([address])     Skin cancer   • Immunotherapy 2021   • Lymphoma ([address])    • Melanoma ([address])         PAST SURGICAL HISTORY:  Past Surgical History:   Procedure Laterality Date   • LYMPH NODE BIOPSY Right 2021    Procedure: BIOPSY LYMPH NODE SENTINEL, INTRAOPERATIVE . LYMPHATIC MAPPING, LYMPHOSCINTIGRAPHY (NUC MED INJECT AT 0730); Surgeon: Diana Beauchamp MD;  Location: [facility];  Service: Surgical Oncology   • SKIN LESION EXCISION Right 2021    Procedure: EXCISION WIDE LESION UPPER EXTREMITY;  Surgeon: Diana Beauchamp MD;  Location: [facility];  Service: Surgical Oncology   • SPLIT THICKNESS SKIN GRAFT Right 2021    Procedure: RIGHT SHOULDER  COMPLEX CLOSURE;  Surgeon: Mario Aguiar MD;  Location: [facility];  Service: Plastics   • WISDOM TOOTH EXTRACTION          SOCIAL HISTORY:  Social History     Socioeconomic History   • Marital status: Single     Spouse name: None   • Number of children: None   • Years of education: None   • Highest education level: None   Occupational History   • None   Tobacco Use   • Smoking status: Former     Packs/day: 0.25     Years: 1.00     Total pack years: 0.25     Types: Cigarettes     Quit date: 2018     Years since quittin.7   • Smokeless tobacco: Never   • Tobacco comments:     about 2-3 years ago    Vaping Use   • Vaping Use: Never used   Substance and Sexual Activity   • Alcohol use:  Yes     Alcohol/week: 1.0 standard drink of alcohol     Types: 1 Cans of beer per week     Comment: rare   • Drug use: No   • Sexual activity: Yes     Partners: Male   Other Topics Concern   • None   Social History Narrative    Denied history of daily coffee consumption      Social Determinants of Health Financial Resource Strain: Not on file   Food Insecurity: Not on file   Transportation Needs: Not on file   Physical Activity: Not on file   Stress: Not on file   Social Connections: Not on file   Intimate Partner Violence: Not on file   Housing Stability: Not on file        FAMILY HISTORY:  Family History   Problem Relation Age of Onset   • No Known Problems Mother    • Breast cancer Maternal Grandmother    • Breast cancer Maternal Aunt    • Hypothyroidism Maternal Uncle            Objective    PHYSICAL EXAMINATION:   Blood pressure 118/76, pulse 68, temperature 97.7 °F (36.5 °C), temperature source Temporal, resp. rate 16, height 5' 7" (1.702 m), weight 74.8 kg (165 lb), SpO2 95 %, unknown if currently breastfeeding. Pain Score: 0-No pain     ECOG Performance Status    Flowsheet Row Most Recent Value   ECOG Performance Status 0 - Fully active, able to carry on all pre-disease performance without restriction               Physical Exam  Vitals and nursing note reviewed. Constitutional:       General: She is not in acute distress. Appearance: Normal appearance. She is well-developed. HENT:      Head: Normocephalic and atraumatic. Right Ear: External ear normal.      Left Ear: External ear normal.      Nose: Nose normal.      Mouth/Throat:      Mouth: Mucous membranes are moist.      Pharynx: Oropharynx is clear. Eyes:      Conjunctiva/sclera: Conjunctivae normal.   Cardiovascular:      Rate and Rhythm: Normal rate and regular rhythm. Heart sounds: No murmur heard. Pulmonary:      Effort: Pulmonary effort is normal. No respiratory distress. Breath sounds: Normal breath sounds. Abdominal:      General: There is no distension. Palpations: Abdomen is soft. Tenderness: There is no abdominal tenderness. Musculoskeletal:         General: No swelling. Cervical back: Neck supple. Lymphadenopathy:      Cervical: No cervical adenopathy.    Skin:     General: Skin is warm and dry. Capillary Refill: Capillary refill takes less than 2 seconds. Comments: Well healed surgical scar on L shoulder. Neurological:      Mental Status: She is alert. Psychiatric:         Mood and Affect: Mood normal.         I reviewed lab data in the chart.     WBC   Date Value Ref Range Status   09/29/2023 5.33 4.31 - 10.16 Thousand/uL Final   06/13/2023 8.66 4.31 - 10.16 Thousand/uL Final   10/14/2022 7.68 4.31 - 10.16 Thousand/uL Final     Hemoglobin   Date Value Ref Range Status   09/29/2023 14.4 11.5 - 15.4 g/dL Final   06/13/2023 14.2 11.5 - 15.4 g/dL Final   10/14/2022 12.2 11.5 - 15.4 g/dL Final     Platelets   Date Value Ref Range Status   09/29/2023 285 149 - 390 Thousands/uL Final   06/13/2023 311 149 - 390 Thousands/uL Final   10/14/2022 254 149 - 390 Thousands/uL Final     MCV   Date Value Ref Range Status   09/29/2023 93 82 - 98 fL Final   06/13/2023 87 82 - 98 fL Final   10/14/2022 88 82 - 98 fL Final      Potassium   Date Value Ref Range Status   09/29/2023 4.4 3.5 - 5.3 mmol/L Final   06/13/2023 4.5 3.5 - 5.3 mmol/L Final   10/14/2022 4.3 3.5 - 5.3 mmol/L Final     Chloride   Date Value Ref Range Status   09/29/2023 107 96 - 108 mmol/L Final   06/13/2023 111 (H) 96 - 108 mmol/L Final   10/14/2022 109 (H) 96 - 108 mmol/L Final     CO2   Date Value Ref Range Status   09/29/2023 29 21 - 32 mmol/L Final   06/13/2023 28 21 - 32 mmol/L Final   10/14/2022 23 21 - 32 mmol/L Final     BUN   Date Value Ref Range Status   09/29/2023 13 5 - 25 mg/dL Final   06/13/2023 13 5 - 25 mg/dL Final   10/14/2022 9 5 - 25 mg/dL Final     Creatinine   Date Value Ref Range Status   09/29/2023 0.98 0.60 - 1.30 mg/dL Final     Comment:     Standardized to IDMS reference method   06/13/2023 0.88 0.60 - 1.30 mg/dL Final     Comment:     Standardized to IDMS reference method   10/14/2022 0.57 (L) 0.60 - 1.30 mg/dL Final     Comment:     Standardized to IDMS reference method     Glucose   Date Value Ref Range Status   07/18/2022 82 65 - 140 mg/dL Final     Comment:     If the patient is fasting, the ADA then defines impaired fasting glucose as > 100 mg/dL and diabetes as > or equal to 123 mg/dL. Specimen collection should occur prior to Sulfasalazine administration due to the potential for falsely depressed results. Specimen collection should occur prior to Sulfapyridine administration due to the potential for falsely elevated results. 06/14/2022 91 65 - 140 mg/dL Final     Comment:     If the patient is fasting, the ADA then defines impaired fasting glucose as > 100 mg/dL and diabetes as > or equal to 123 mg/dL. Specimen collection should occur prior to Sulfasalazine administration due to the potential for falsely depressed results. Specimen collection should occur prior to Sulfapyridine administration due to the potential for falsely elevated results. 02/02/2022 122 65 - 140 mg/dL Final     Comment:     If the patient is fasting, the ADA then defines impaired fasting glucose as > 100 mg/dL and diabetes as > or equal to 123 mg/dL. Specimen collection should occur prior to Sulfasalazine administration due to the potential for falsely depressed results. Specimen collection should occur prior to Sulfapyridine administration due to the potential for falsely elevated results. Calcium   Date Value Ref Range Status   09/29/2023 9.5 8.4 - 10.2 mg/dL Final   06/13/2023 9.2 8.3 - 10.1 mg/dL Final   10/14/2022 8.8 8.3 - 10.1 mg/dL Final     Albumin   Date Value Ref Range Status   09/29/2023 4.4 3.5 - 5.0 g/dL Final   06/13/2023 3.7 3.5 - 5.0 g/dL Final   10/14/2022 2.8 (L) 3.5 - 5.0 g/dL Final     Total Bilirubin   Date Value Ref Range Status   09/29/2023 1.33 (H) 0.20 - 1.00 mg/dL Final     Comment:     Use of this assay is not recommended for patients undergoing treatment with eltrombopag due to the potential for falsely elevated results.   N-acetyl-p-benzoquinone imine (metabolite of Acetaminophen) will generate erroneously low results in samples for patients that have taken an overdose of Acetaminophen.   06/13/2023 0.85 0.20 - 1.00 mg/dL Final     Comment:     Use of this assay is not recommended for patients undergoing treatment with eltrombopag due to the potential for falsely elevated results. 10/14/2022 1.18 (H) 0.20 - 1.00 mg/dL Final     Comment:     Use of this assay is not recommended for patients undergoing treatment with eltrombopag due to the potential for falsely elevated results. Alkaline Phosphatase   Date Value Ref Range Status   09/29/2023 85 34 - 104 U/L Final   06/13/2023 106 46 - 116 U/L Final   10/14/2022 56 46 - 116 U/L Final     AST   Date Value Ref Range Status   09/29/2023 20 13 - 39 U/L Final   06/13/2023 23 5 - 45 U/L Final     Comment:     Specimen collection should occur prior to Sulfasalazine administration due to the potential for falsely depressed results. 10/14/2022 9 5 - 45 U/L Final     Comment:     Specimen collection should occur prior to Sulfasalazine administration due to the potential for falsely depressed results. ALT   Date Value Ref Range Status   09/29/2023 12 7 - 52 U/L Final     Comment:     Specimen collection should occur prior to Sulfasalazine administration due to the potential for falsely depressed results. 06/13/2023 32 12 - 78 U/L Final     Comment:     Specimen collection should occur prior to Sulfasalazine and/or Sulfapyridine administration due to the potential for falsely depressed results. 10/14/2022 13 12 - 78 U/L Final     Comment:     Specimen collection should occur prior to Sulfasalazine and/or Sulfapyridine administration due to the potential for falsely depressed results.        LD   Date Value Ref Range Status   09/29/2023 168 140 - 271 U/L Final   06/13/2023 176 81 - 234 U/L Final   10/14/2022 179 81 - 234 U/L Final     No results found for: "TSH"  No results found for: "B3AHESH"   Free T4   Date Value Ref Range Status   09/29/2023 0.93 0.61 - 1.12 ng/dL Final     Comment:     Specimens with biotin concentrations > 10 ng/mL can lead to significant (> 10%) positive bias in result.   07/31/2023 0.84 0.61 - 1.12 ng/dL Final     Comment:     Specimens with biotin concentrations > 10 ng/mL can lead to significant (> 10%) positive bias in result.   06/13/2023 0.87 0.61 - 1.12 ng/dL Final     Comment:     Specimens with biotin concentrations > 10 ng/mL can lead to significant (> 10%) positive bias in result. RECENT IMAGING:  No results found. Assessment    Assessment/Plan  No problem-specific Assessment & Plan notes found for this encounter. 1. Malignant melanoma of shoulder, right [facility])  Patient no longer on pembrolizumab therapy as treatment was stopped when she became pregnant. Patient is 6 months postpartum. She is under continued monitoring and surveillance at this point in time with routine scans every 6 months. She follows with dermatology routinely. She knows to watch for signs of symptoms of immune mediated side effects that can last for up to a year after treatment. Denies any symptoms or concerns at this time. Patient will follow-up in clinic in 3 months with labwork a new CT chest/abdomen/pelvis scan as well. 2. Hypothyroidism, unspecified type  TSH level increased to 5 after previous visit was at a 2. Will increase levothyroxine dose too 100mcg and recheck levels in 6 weeks. Patient has had okay energy levels. 3. Immunotherapy  4. High risk medications (not anticoagulants) long-term use  We will monitor labs with each visit as delayed onset of immune mediated side effects can occur for up to at least 1 year after treatment. She knows to watch for signs and symptoms for immune mediated side effects.  Denies any immune mediated side effects at this time. Return to clinic in 3 months with labs and scan.      Norm Holland MD

## 2023-10-02 NOTE — LETTER
October 2, 2023     Natacha Rivera, [address] [facility] [address]  [address] [address]    Patient: Pierce Krishnamurthy   YOB: 1990   Date of Visit: 10/2/2023       Dear Dr. Shaw Repress: Thank you for referring Reyes Gear to me for evaluation. Below are my notes for this consultation. If you have questions, please do not hesitate to call me. I look forward to following your patient along with you. Sincerely,        hCao Crawford MD        CC: Elenor Showers, DO Markham Moritz, MD Saratha Harbour, CRNP Shivani Silvester Mess, MD Faustine Baumgarten, MD  10/2/2023 10:18 AM  Attested  [facility] [address]  [phone]     Date of Visit: 10/2/2023  Name: Pierce Krishnamurthy   YOB: 1990        Subjective    VISIT DIAGNOSIS:  Diagnoses and all orders for this visit:    Malignant melanoma of shoulder, right ([address])  -     CT chest abdomen pelvis w contrast; Future  -     CBC and differential; Future  -     Comprehensive metabolic panel; Future  -     LD,Blood; Future  -     T3, free; Future  -     T4, free; Future  -     TSH, 3rd generation; Future    Encounter for follow-up surveillance of melanoma    High risk medications (not anticoagulants) long-term use  -     CT chest abdomen pelvis w contrast; Future  -     CBC and differential; Future  -     Comprehensive metabolic panel; Future  -     LD,Blood; Future  -     T3, free; Future  -     T4, free; Future  -     TSH, 3rd generation; Future    Immunotherapy  -     CT chest abdomen pelvis w contrast; Future  -     CBC and differential; Future  -     Comprehensive metabolic panel; Future  -     LD,Blood; Future  -     T3, free; Future  -     T4, free; Future  -     TSH, 3rd generation; Future    Hypothyroidism, unspecified type  -     CT chest abdomen pelvis w contrast; Future  -     CBC and differential; Future  -     Comprehensive metabolic panel;  Future  - LD,Blood; Future  -     T3, free; Future  -     T4, free; Future  -     TSH, 3rd generation; Future  -     levothyroxine (Euthyrox) 100 mcg tablet;  Take 1 tablet (100 mcg total) by mouth daily        Oncology History   Malignant melanoma of shoulder, right ([address])   7/8/2021 Biopsy    Right anterior shoulder:  - Ulcerated malignant melanoma, 2.5mm    Ulceration: Yes  Mitosis: >1    NGS Testing  MS-Stable  TMB - 13Muts/Mb  CDKN2A E10  TERT promoter -124C>T       7/8/2021 Genomic Testing    DecisionDx - Class 2B     7/8/2021 -  Cancer Staged    Staging form: Melanoma of the Skin, AJCC 8th Edition  - Pathologic stage from 7/8/2021: Stage IIB (pT3b, pN0, cM0) - Signed by Trudy Dominguez MD on 9/20/2021  Stage prefix: Initial diagnosis       8/18/2021 Surgery    Wide excision melanoma with sentinel lymph node biopsy  No residual melanoma  LNs negative - 0/9 LNs     11/17/2021 - 6/16/2022 Chemotherapy    pembrolizumab (KEYTRUDA) IVPB, 400 mg, Intravenous, Once, 6 of 9 cycles  Administration: 400 mg (11/17/2021), 400 mg (12/29/2021), 400 mg (2/7/2022), 400 mg (3/21/2022), 400 mg (5/2/2022), 400 mg (6/16/2022)        Cancer Staging   Malignant melanoma of shoulder, right (HCC)  Staging form: Melanoma of the Skin, AJCC 8th Edition  - Pathologic stage from 7/8/2021: Stage IIB (pT3b, pN0, cM0) - Signed by Trudy Dominguez MD on 9/20/2021     Treatment Details   Treatment goal Curative   Plan Name OP Pembrolizumab Q 42 Days   Status Inactive   Start Date 11/17/2021   End Date 6/16/2022   Provider Trudy Dominguez MD   Chemotherapy pembrolizumab [facility]) IVPB, 400 mg, Intravenous, Once, 6 of 9 cycles  Administration: 400 mg (11/17/2021), 400 mg (12/29/2021), 400 mg (2/7/2022), 400 mg (3/21/2022), 400 mg (5/2/2022), 400 mg (6/16/2022)          HISTORY OF PRESENT ILLNESS: Monalisa Argueta is a 29 y.o. female  who has stage IIb melanoma who received adjuvant pembrolizumab for 6 cycles before coming pregnant here for continued monitoring, follow-up, and surveillance. Patient had a baby approximately 6 months ago and all is doing well on that end. She feels well overall and has no acute concerns today aside from a rising TSH level on her latest lab work. She said her energy level is good and she denies any nausea, vomiting, fevers, chills, recent sicknesses or illnesses or unexpected weight loss. Patient denies any new or changing skin lesions and does follow with dermatology routinely. Last CT scan in June revealed no evidence of recurrent or metastatic disease. REVIEW OF SYSTEMS:  Review of Systems   Constitutional: Negative for appetite change, fatigue, fever and unexpected weight change. HENT:   Negative for lump/mass, trouble swallowing and voice change. Eyes: Negative for icterus. Respiratory: Negative for cough, shortness of breath and wheezing. Cardiovascular: Negative for leg swelling. Gastrointestinal: Negative for abdominal pain, constipation, diarrhea, nausea and vomiting. Genitourinary: Negative for difficulty urinating and hematuria. Musculoskeletal: Negative for arthralgias, gait problem and myalgias. Skin: Negative for itching and rash. No new, changing, or concerning lesions. Neurological: Negative for extremity weakness, gait problem, headaches, light-headedness and numbness. Hematological: Negative for adenopathy.         MEDICATIONS:    Current Outpatient Medications:   •  betamethasone dipropionate (DIPROSONE) 0.05 % cream, Apply topically 2 (two) times a day, Disp: 30 g, Rfl: 0  •  clindamycin (CLEOCIN T) 1 % lotion, Apply to face, Disp: , Rfl:   •  levothyroxine (Euthyrox) 100 mcg tablet, Take 1 tablet (100 mcg total) by mouth daily, Disp: 30 tablet, Rfl: 3  •  levothyroxine 88 mcg tablet, TAKE 1 TABLET BY MOUTH EVERY DAY, Disp: 90 tablet, Rfl: 0  •  Prenatal Vit-Fe Fumarate-FA (PRENATAL VITAMIN PO), Take 1 tablet by mouth in the morning, Disp: , Rfl:   •  docusate sodium (COLACE) 250 MG capsule, Take 250 mg by mouth daily Patient reports she is taking off brand colace (not sure of the exact name) PRN (Patient not taking: Reported on 7/25/2023), Disp: , Rfl:      ALLERGIES:  Allergies   Allergen Reactions   • Strawberry Extract - Food Allergy Swelling        ACTIVE PROBLEMS:  Patient Active Problem List   Diagnosis   • ADD (attention deficit disorder) without hyperactivity   • Malignant melanoma of shoulder, right (HCC)   • Family history of breast cancer   • High risk medications (not anticoagulants) long-term use   • Immunotherapy   • Hypothyroidism   • First trimester bleeding   • Cancer complicating pregnancy in second trimester   • Abnormal MSAFP (maternal serum alpha-fetoprotein), elevated   • Thyroid disease affecting pregnancy          PAST MEDICAL HISTORY:   Past Medical History:   Diagnosis Date   • Cancer ([address])     Skin cancer   • Immunotherapy 11/2021   • Lymphoma ([address])    • Melanoma ([address]) 2021        PAST SURGICAL HISTORY:  Past Surgical History:   Procedure Laterality Date   • LYMPH NODE BIOPSY Right 8/18/2021    Procedure: BIOPSY LYMPH NODE SENTINEL, INTRAOPERATIVE . LYMPHATIC MAPPING, LYMPHOSCINTIGRAPHY (NUC MED INJECT AT 0730);   Surgeon: Kj Syed MD;  Location: [facility];  Service: Surgical Oncology   • SKIN LESION EXCISION Right 8/18/2021    Procedure: EXCISION WIDE LESION UPPER EXTREMITY;  Surgeon: Kj Syed MD;  Location[facility];  Service: Surgical Oncology   • SPLIT THICKNESS SKIN GRAFT Right 8/18/2021    Procedure: RIGHT SHOULDER  COMPLEX CLOSURE;  Surgeon: Adelina Casillas MD;  Location: [facility];  Service: Plastics   • WISDOM TOOTH EXTRACTION          SOCIAL HISTORY:  Social History     Socioeconomic History   • Marital status: Single     Spouse name: None   • Number of children: None   • Years of education: None   • Highest education level: None   Occupational History   • None   Tobacco Use   • Smoking status: Former Packs/day: 0.25     Years: 1.00     Total pack years: 0.25     Types: Cigarettes     Quit date: 2018     Years since quittin.7   • Smokeless tobacco: Never   • Tobacco comments:     about 2-3 years ago    Vaping Use   • Vaping Use: Never used   Substance and Sexual Activity   • Alcohol use: Yes     Alcohol/week: 1.0 standard drink of alcohol     Types: 1 Cans of beer per week     Comment: rare   • Drug use: No   • Sexual activity: Yes     Partners: Male   Other Topics Concern   • None   Social History Narrative    Denied history of daily coffee consumption      Social Determinants of Health     Financial Resource Strain: Not on file   Food Insecurity: Not on file   Transportation Needs: Not on file   Physical Activity: Not on file   Stress: Not on file   Social Connections: Not on file   Intimate Partner Violence: Not on file   Housing Stability: Not on file        FAMILY HISTORY:  Family History   Problem Relation Age of Onset   • No Known Problems Mother    • Breast cancer Maternal Grandmother    • Breast cancer Maternal Aunt    • Hypothyroidism Maternal Uncle            Objective    PHYSICAL EXAMINATION:   Blood pressure 118/76, pulse 68, temperature 97.7 °F (36.5 °C), temperature source Temporal, resp. rate 16, height 5' 7" (1.702 m), weight 74.8 kg (165 lb), SpO2 95 %, unknown if currently breastfeeding. Pain Score: 0-No pain     ECOG Performance Status      Flowsheet Row Most Recent Value   ECOG Performance Status 0 - Fully active, able to carry on all pre-disease performance without restriction                 Physical Exam  Vitals and nursing note reviewed. Constitutional:       General: She is not in acute distress. Appearance: Normal appearance. She is well-developed. HENT:      Head: Normocephalic and atraumatic.       Right Ear: External ear normal.      Left Ear: External ear normal.      Nose: Nose normal.      Mouth/Throat:      Mouth: Mucous membranes are moist.      Pharynx: Oropharynx is clear. Eyes:      Conjunctiva/sclera: Conjunctivae normal.   Cardiovascular:      Rate and Rhythm: Normal rate and regular rhythm. Heart sounds: No murmur heard. Pulmonary:      Effort: Pulmonary effort is normal. No respiratory distress. Breath sounds: Normal breath sounds. Abdominal:      General: There is no distension. Palpations: Abdomen is soft. Tenderness: There is no abdominal tenderness. Musculoskeletal:         General: No swelling. Cervical back: Neck supple. Lymphadenopathy:      Cervical: No cervical adenopathy. Skin:     General: Skin is warm and dry. Capillary Refill: Capillary refill takes less than 2 seconds. Comments: Well healed surgical scar on L shoulder. Neurological:      Mental Status: She is alert. Psychiatric:         Mood and Affect: Mood normal.         I reviewed lab data in the chart.     WBC   Date Value Ref Range Status   09/29/2023 5.33 4.31 - 10.16 Thousand/uL Final   06/13/2023 8.66 4.31 - 10.16 Thousand/uL Final   10/14/2022 7.68 4.31 - 10.16 Thousand/uL Final     Hemoglobin   Date Value Ref Range Status   09/29/2023 14.4 11.5 - 15.4 g/dL Final   06/13/2023 14.2 11.5 - 15.4 g/dL Final   10/14/2022 12.2 11.5 - 15.4 g/dL Final     Platelets   Date Value Ref Range Status   09/29/2023 285 149 - 390 Thousands/uL Final   06/13/2023 311 149 - 390 Thousands/uL Final   10/14/2022 254 149 - 390 Thousands/uL Final     MCV   Date Value Ref Range Status   09/29/2023 93 82 - 98 fL Final   06/13/2023 87 82 - 98 fL Final   10/14/2022 88 82 - 98 fL Final      Potassium   Date Value Ref Range Status   09/29/2023 4.4 3.5 - 5.3 mmol/L Final   06/13/2023 4.5 3.5 - 5.3 mmol/L Final   10/14/2022 4.3 3.5 - 5.3 mmol/L Final     Chloride   Date Value Ref Range Status   09/29/2023 107 96 - 108 mmol/L Final   06/13/2023 111 (H) 96 - 108 mmol/L Final   10/14/2022 109 (H) 96 - 108 mmol/L Final     CO2   Date Value Ref Range Status   09/29/2023 29 21 - 32 mmol/L Final   06/13/2023 28 21 - 32 mmol/L Final   10/14/2022 23 21 - 32 mmol/L Final     BUN   Date Value Ref Range Status   09/29/2023 13 5 - 25 mg/dL Final   06/13/2023 13 5 - 25 mg/dL Final   10/14/2022 9 5 - 25 mg/dL Final     Creatinine   Date Value Ref Range Status   09/29/2023 0.98 0.60 - 1.30 mg/dL Final     Comment:     Standardized to IDMS reference method   06/13/2023 0.88 0.60 - 1.30 mg/dL Final     Comment:     Standardized to IDMS reference method   10/14/2022 0.57 (L) 0.60 - 1.30 mg/dL Final     Comment:     Standardized to IDMS reference method     Glucose   Date Value Ref Range Status   07/18/2022 82 65 - 140 mg/dL Final     Comment:     If the patient is fasting, the ADA then defines impaired fasting glucose as > 100 mg/dL and diabetes as > or equal to 123 mg/dL. Specimen collection should occur prior to Sulfasalazine administration due to the potential for falsely depressed results. Specimen collection should occur prior to Sulfapyridine administration due to the potential for falsely elevated results. 06/14/2022 91 65 - 140 mg/dL Final     Comment:     If the patient is fasting, the ADA then defines impaired fasting glucose as > 100 mg/dL and diabetes as > or equal to 123 mg/dL. Specimen collection should occur prior to Sulfasalazine administration due to the potential for falsely depressed results. Specimen collection should occur prior to Sulfapyridine administration due to the potential for falsely elevated results. 02/02/2022 122 65 - 140 mg/dL Final     Comment:     If the patient is fasting, the ADA then defines impaired fasting glucose as > 100 mg/dL and diabetes as > or equal to 123 mg/dL. Specimen collection should occur prior to Sulfasalazine administration due to the potential for falsely depressed results. Specimen collection should occur prior to Sulfapyridine administration due to the potential for falsely elevated results.      Calcium   Date Value Ref Range Status 09/29/2023 9.5 8.4 - 10.2 mg/dL Final   06/13/2023 9.2 8.3 - 10.1 mg/dL Final   10/14/2022 8.8 8.3 - 10.1 mg/dL Final     Albumin   Date Value Ref Range Status   09/29/2023 4.4 3.5 - 5.0 g/dL Final   06/13/2023 3.7 3.5 - 5.0 g/dL Final   10/14/2022 2.8 (L) 3.5 - 5.0 g/dL Final     Total Bilirubin   Date Value Ref Range Status   09/29/2023 1.33 (H) 0.20 - 1.00 mg/dL Final     Comment:     Use of this assay is not recommended for patients undergoing treatment with eltrombopag due to the potential for falsely elevated results. N-acetyl-p-benzoquinone imine (metabolite of Acetaminophen) will generate erroneously low results in samples for patients that have taken an overdose of Acetaminophen.   06/13/2023 0.85 0.20 - 1.00 mg/dL Final     Comment:     Use of this assay is not recommended for patients undergoing treatment with eltrombopag due to the potential for falsely elevated results. 10/14/2022 1.18 (H) 0.20 - 1.00 mg/dL Final     Comment:     Use of this assay is not recommended for patients undergoing treatment with eltrombopag due to the potential for falsely elevated results. Alkaline Phosphatase   Date Value Ref Range Status   09/29/2023 85 34 - 104 U/L Final   06/13/2023 106 46 - 116 U/L Final   10/14/2022 56 46 - 116 U/L Final     AST   Date Value Ref Range Status   09/29/2023 20 13 - 39 U/L Final   06/13/2023 23 5 - 45 U/L Final     Comment:     Specimen collection should occur prior to Sulfasalazine administration due to the potential for falsely depressed results. 10/14/2022 9 5 - 45 U/L Final     Comment:     Specimen collection should occur prior to Sulfasalazine administration due to the potential for falsely depressed results. ALT   Date Value Ref Range Status   09/29/2023 12 7 - 52 U/L Final     Comment:     Specimen collection should occur prior to Sulfasalazine administration due to the potential for falsely depressed results.     06/13/2023 32 12 - 78 U/L Final     Comment: Specimen collection should occur prior to Sulfasalazine and/or Sulfapyridine administration due to the potential for falsely depressed results. 10/14/2022 13 12 - 78 U/L Final     Comment:     Specimen collection should occur prior to Sulfasalazine and/or Sulfapyridine administration due to the potential for falsely depressed results. LD   Date Value Ref Range Status   09/29/2023 168 140 - 271 U/L Final   06/13/2023 176 81 - 234 U/L Final   10/14/2022 179 81 - 234 U/L Final     No results found for: "TSH"  No results found for: "N6ZSMGN"   Free T4   Date Value Ref Range Status   09/29/2023 0.93 0.61 - 1.12 ng/dL Final     Comment:     Specimens with biotin concentrations > 10 ng/mL can lead to significant (> 10%) positive bias in result.   07/31/2023 0.84 0.61 - 1.12 ng/dL Final     Comment:     Specimens with biotin concentrations > 10 ng/mL can lead to significant (> 10%) positive bias in result.   06/13/2023 0.87 0.61 - 1.12 ng/dL Final     Comment:     Specimens with biotin concentrations > 10 ng/mL can lead to significant (> 10%) positive bias in result. RECENT IMAGING:  No results found. Assessment    Assessment/Plan  No problem-specific Assessment & Plan notes found for this encounter. 1. Malignant melanoma of shoulder, right [facility])  Patient no longer on pembrolizumab therapy as treatment was stopped when she became pregnant. Patient is 6 months postpartum. She is under continued monitoring and surveillance at this point in time with routine scans every 6 months. She follows with dermatology routinely. She knows to watch for signs of symptoms of immune mediated side effects that can last for up to a year after treatment. Denies any symptoms or concerns at this time. Patient will follow-up in clinic in 3 months with labwork a new CT chest/abdomen/pelvis scan as well. 2. Hypothyroidism, unspecified type  TSH level increased to 5 after previous visit was at a 2.   Will increase levothyroxine dose too 100mcg and recheck levels in 6 weeks. Patient has had okay energy levels. 3. Immunotherapy  4. High risk medications (not anticoagulants) long-term use  We will monitor labs with each visit as delayed onset of immune mediated side effects can occur for up to at least 1 year after treatment. She knows to watch for signs and symptoms for immune mediated side effects. Denies any immune mediated side effects at this time. Return to clinic in 3 months with labs and scan. Gordo Trejo MD      Attestation with edits by Chantelle Meeks MD at 10/2/2023  5:37 PM:  I interviewed, took the history and examined the patient. I discussed the case with the Resident and reviewed the Resident’s note , prescribed medications, and orders placed. I supervised the Resident and I agree with the Resident management plan as it was presented to me. I was present in the clinic and examined the patient. Ms. Audra Snell is a 33-year-old female with stage IIb melanoma status post adjuvant treatment with pembrolizumab that was stopped early, approximately after 6 months, due to becoming pregnant. She is now 6 months postpartum and doing well. Denies any new, changing, concerning skin lesions. Denies any lymphadenopathy. Continues to follow closely with dermatology. Denies any immune mediated side effects aside from her hypothyroidism. Denies headaches, double vision, rash, itching, chest pain, shortness breath, diarrhea. Denies muscle weakness. Positive fatigue relative to a 10month-old and a toddler. On exam ECOG PS 0. No concerning skin lesions. Well healed scar on her right shoulder with no evidence of recurrence at the primary site. No lymphadenopathy. Ms. Audra Snell is a 33-year-old female with stage IIb melanoma status post adjuvant treatment pembrolizumab stopped early due to pregnancy, here for continued monitoring, follow-up, and surveillance. Doing well. Labs reviewed.   TSH is a little elevated. We will increase her levothyroxine to 100 mcg daily. We will have her recheck her blood work in approximately 6 weeks. She does continue to follow with dermatology. She knows to continue to monitor for immune mediated side effects as they can have delayed onset. She will follow-up in 3 months with full set of imaging. Orders placed and prescription provided. Orders placed for blood work as well. She knows to call with any issues or concerns prior to her next visit.     Manav Roblero MD 10/02/23

## 2023-10-25 DIAGNOSIS — E03.2 HYPOTHYROIDISM DUE TO MEDICATION: ICD-10-CM

## 2023-10-25 RX ORDER — LEVOTHYROXINE SODIUM 88 UG/1
TABLET ORAL
Qty: 30 TABLET | Refills: 2 | Status: SHIPPED | OUTPATIENT
Start: 2023-10-25

## 2023-11-22 DIAGNOSIS — E03.2 HYPOTHYROIDISM DUE TO MEDICATION: ICD-10-CM

## 2023-11-22 RX ORDER — LEVOTHYROXINE SODIUM 88 UG/1
TABLET ORAL
Qty: 90 TABLET | Refills: 1 | Status: SHIPPED | OUTPATIENT
Start: 2023-11-22

## 2023-12-15 ENCOUNTER — TELEPHONE (OUTPATIENT)
Dept: FAMILY MEDICINE CLINIC | Facility: CLINIC | Age: 33
End: 2023-12-15

## 2023-12-15 DIAGNOSIS — C43.61 MALIGNANT MELANOMA OF SHOULDER, RIGHT (HCC): Primary | ICD-10-CM

## 2023-12-15 NOTE — TELEPHONE ENCOUNTER
Received a call from the patient stating she had a change in insurance and needs a new referral for Oncology and Dermatology. Can those be entered? Thanks!

## 2023-12-19 DIAGNOSIS — C43.61 MALIGNANT MELANOMA OF SHOULDER, RIGHT (HCC): Primary | ICD-10-CM

## 2024-01-04 ENCOUNTER — TELEPHONE (OUTPATIENT)
Dept: FAMILY MEDICINE CLINIC | Facility: CLINIC | Age: 34
End: 2024-01-04

## 2024-01-04 NOTE — TELEPHONE ENCOUNTER
Ins referrals completed.    Oncology:  Referral: [id number]  Effective dates: 1/4/24-4/3/24    Dermatology:  Referral: [id number]  Effective dates: 1/4/24-4/3/24    Auths scanned into media

## 2024-01-04 NOTE — TELEPHONE ENCOUNTER
Received a vm from the patient stating she needs an insurance referral for Oncology and Dermatology.    [facility] Hematology and Oncology  [address]  NPI: [id number]  DX: [id number]    [facility] Dermatology  [address]  NPI: [id number]  DX: [id number]

## 2024-01-05 ENCOUNTER — HOSPITAL ENCOUNTER (OUTPATIENT)
Dept: CT IMAGING | Facility: HOSPITAL | Age: 34
End: 2024-01-05
Attending: INTERNAL MEDICINE
Payer: COMMERCIAL

## 2024-01-05 DIAGNOSIS — C43.61 MALIGNANT MELANOMA OF SHOULDER, RIGHT (HCC): ICD-10-CM

## 2024-01-05 DIAGNOSIS — Z29.89 IMMUNOTHERAPY: ICD-10-CM

## 2024-01-05 DIAGNOSIS — Z79.899 HIGH RISK MEDICATIONS (NOT ANTICOAGULANTS) LONG-TERM USE: ICD-10-CM

## 2024-01-05 DIAGNOSIS — E03.9 HYPOTHYROIDISM, UNSPECIFIED TYPE: ICD-10-CM

## 2024-01-05 PROCEDURE — 71260 CT THORAX DX C+: CPT

## 2024-01-05 PROCEDURE — 74177 CT ABD & PELVIS W/CONTRAST: CPT

## 2024-01-05 PROCEDURE — G1004 CDSM NDSC: HCPCS

## 2024-01-10 ENCOUNTER — TELEPHONE (OUTPATIENT)
Dept: HEMATOLOGY ONCOLOGY | Facility: CLINIC | Age: 34
End: 2024-01-10

## 2024-01-10 NOTE — TELEPHONE ENCOUNTER
Appointment Confirmation   Who are you speaking with? Patient   If it is not the patient, are they listed on an active communication consent form? N/A   Which provider is the appointment scheduled with?  Dr. Gaona   When is the appointment scheduled?  Please list date and time 1/17/24 @ 4pm   At which location is the appointment scheduled to take place? [facility]   Did caller verbalize understanding of appointment details? Yes

## 2024-01-15 ENCOUNTER — TELEPHONE (OUTPATIENT)
Age: 34
End: 2024-01-15

## 2024-01-15 NOTE — TELEPHONE ENCOUNTER
Patient is requesting an insurance referral for the following specialty:      Test Name / Order Name:     DX Code: c43.61  Z08  Z85.820    Date Of Service:     Location/Facility Name/Address/Phone #:     Location / Facility NPI:     Best Phone # To Reach The Patient:  [phone]

## 2024-01-17 ENCOUNTER — TELEPHONE (OUTPATIENT)
Age: 34
End: 2024-01-17

## 2024-01-17 ENCOUNTER — OFFICE VISIT (OUTPATIENT)
Dept: HEMATOLOGY ONCOLOGY | Facility: CLINIC | Age: 34
End: 2024-01-17
Payer: COMMERCIAL

## 2024-01-17 VITALS
OXYGEN SATURATION: 99 % | SYSTOLIC BLOOD PRESSURE: 122 MMHG | BODY MASS INDEX: 24.96 KG/M2 | TEMPERATURE: 98 F | HEIGHT: 67 IN | WEIGHT: 159 LBS | HEART RATE: 70 BPM | DIASTOLIC BLOOD PRESSURE: 70 MMHG | RESPIRATION RATE: 18 BRPM

## 2024-01-17 DIAGNOSIS — Z08 ENCOUNTER FOR FOLLOW-UP SURVEILLANCE OF MELANOMA: ICD-10-CM

## 2024-01-17 DIAGNOSIS — Z85.820 ENCOUNTER FOR FOLLOW-UP SURVEILLANCE OF MELANOMA: ICD-10-CM

## 2024-01-17 DIAGNOSIS — C43.61 MALIGNANT MELANOMA OF SHOULDER, RIGHT (HCC): Primary | ICD-10-CM

## 2024-01-17 DIAGNOSIS — Z79.899 HIGH RISK MEDICATIONS (NOT ANTICOAGULANTS) LONG-TERM USE: ICD-10-CM

## 2024-01-17 DIAGNOSIS — E03.9 HYPOTHYROIDISM, UNSPECIFIED TYPE: ICD-10-CM

## 2024-01-17 DIAGNOSIS — Z29.89 IMMUNOTHERAPY: ICD-10-CM

## 2024-01-17 PROCEDURE — 99214 OFFICE O/P EST MOD 30 MIN: CPT | Performed by: INTERNAL MEDICINE

## 2024-01-17 NOTE — TELEPHONE ENCOUNTER
Patient is in need of a insurance referral in order to go to her appointment today at 4pm to see her oncologist. Patient wants to know if it has been already done. Please notify patient as soon as possible since she will have to cancel/reschedule if she does not have this insurance referral. Please contact patient at [phone]

## 2024-01-17 NOTE — LETTER
January 17, 2024     Nicole Fonseca MD  [address] [address]    Patient: Tamiko Rivera   YOB: 1990   Date of Visit: 1/17/2024       Dear Dr. Fonseca:    Thank you for referring Tamiko Rivera to me for evaluation. Below are my notes for this consultation.    If you have questions, please do not hesitate to call me. I look forward to following your patient along with you.         Sincerely,        Ary Gaona MD        CC: DO Lyubov Jean Baptiste MD Shivani Nitin Patel, MD Melissa A Wilson, MD  1/17/2024  5:11 PM  Sign when Signing Visit  [facility] [place]  [address] [facility] [address]  [phone]  [phone]     Date of Visit: 1/17/2024  Name: Tamiko Rivera   YOB: 1990        Subjective    VISIT DIAGNOSIS:  Diagnoses and all orders for this visit:    Malignant melanoma of shoulder, right (HCC)  -     CBC and differential; Standing  -     Comprehensive metabolic panel; Standing  -     LD,Blood; Standing  -     T3, free; Standing  -     T4, free; Standing  -     TSH, 3rd generation; Standing  -     CBC and differential  -     Comprehensive metabolic panel  -     LD,Blood  -     T3, free  -     T4, free  -     TSH, 3rd generation  -     Ambulatory Referral to Dermatology; Future    Encounter for follow-up surveillance of melanoma    High risk medications (not anticoagulants) long-term use  -     CBC and differential; Standing  -     Comprehensive metabolic panel; Standing  -     LD,Blood; Standing  -     T3, free; Standing  -     T4, free; Standing  -     TSH, 3rd generation; Standing  -     CBC and differential  -     Comprehensive metabolic panel  -     LD,Blood  -     T3, free  -     T4, free  -     TSH, 3rd generation  -     Ambulatory Referral to Dermatology; Future    Immunotherapy    Hypothyroidism, unspecified type        Oncology History   Malignant melanoma of shoulder, right (HCC)    7/8/2021 Biopsy    Right anterior shoulder:  - Ulcerated malignant melanoma, 2.5mm    Ulceration: Yes  Mitosis: >1    NGS Testing  MS-Stable  TMB - 13Muts/Mb  CDKN2A E10  TERT promoter -124C>T       7/8/2021 Genomic Testing    DecisionDx - Class 2B     7/8/2021 -  Cancer Staged    Staging form: Melanoma of the Skin, AJCC 8th Edition  - Pathologic stage from 7/8/2021: Stage IIB (pT3b, pN0, cM0) - Signed by Ary Gaona MD on 9/20/2021  Stage prefix: Initial diagnosis       8/18/2021 Surgery    Wide excision melanoma with sentinel lymph node biopsy  No residual melanoma  LNs negative - 0/9 LNs     11/17/2021 - 6/16/2022 Chemotherapy    pembrolizumab (KEYTRUDA) IVPB, 400 mg, Intravenous, Once, 6 of 9 cycles  Administration: 400 mg (11/17/2021), 400 mg (12/29/2021), 400 mg (2/7/2022), 400 mg (3/21/2022), 400 mg (5/2/2022), 400 mg (6/16/2022)        Cancer Staging   Malignant melanoma of shoulder, right (HCC)  Staging form: Melanoma of the Skin, AJCC 8th Edition  - Pathologic stage from 7/8/2021: Stage IIB (pT3b, pN0, cM0) - Signed by Ary Gaona MD on 9/20/2021     Treatment Details   Treatment goal Curative   Plan Name OP Pembrolizumab Q 42 Days   Status Inactive   Start Date 11/17/2021   End Date 6/16/2022   Provider Ary Gaona MD   Chemotherapy pembrolizumab (KEYTRUDA) IVPB, 400 mg, Intravenous, Once, 6 of 9 cycles  Administration: 400 mg (11/17/2021), 400 mg (12/29/2021), 400 mg (2/7/2022), 400 mg (3/21/2022), 400 mg (5/2/2022), 400 mg (6/16/2022)          HISTORY OF PRESENT ILLNESS: Tamiko Rivera is a 33 y.o. female  who 2B melanoma status post 6 cycles of adjuvant pembrolizumab here for continued monitoring, follow-up and surveillance.    She is doing well.  Energy is okay, as expected with 2 young children.  Eating okay.  No issues or concerns or complaints today.  No new, changing, concerning skin lesions.  No lymphadenopathy.  She was unable to get blood work prior to today's visit.    We  did review her imaging that she had done on 1/5/2024 with a CT chest, abdomen and pelvis which demonstrates no evidence of melanoma recurrence or metastasis.  There is note of an atrophic thyroid and uncomplicated fat-containing umbilical hernia.        REVIEW OF SYSTEMS:  Review of Systems   Constitutional:  Negative for appetite change, fatigue, fever and unexpected weight change.   HENT:   Negative for lump/mass, trouble swallowing and voice change.    Eyes:  Negative for icterus.   Respiratory:  Negative for cough, shortness of breath and wheezing.    Cardiovascular:  Negative for leg swelling.   Gastrointestinal:  Negative for abdominal pain, constipation, diarrhea, nausea and vomiting.   Genitourinary:  Negative for difficulty urinating and hematuria.    Musculoskeletal:  Negative for arthralgias, gait problem and myalgias.   Skin:  Negative for itching and rash.        No new, changing, or concerning lesions.   Neurological:  Negative for extremity weakness, gait problem, headaches, light-headedness and numbness.   Hematological:  Negative for adenopathy.        MEDICATIONS:    Current Outpatient Medications:   •  betamethasone dipropionate (DIPROSONE) 0.05 % cream, Apply topically 2 (two) times a day, Disp: 30 g, Rfl: 0  •  clindamycin (CLEOCIN T) 1 % lotion, Apply to face, Disp: , Rfl:   •  docusate sodium (COLACE) 250 MG capsule, Take 250 mg by mouth daily Patient reports she is taking off brand colace (not sure of the exact name) PRN (Patient not taking: Reported on 7/25/2023), Disp: , Rfl:   •  levothyroxine (Euthyrox) 100 mcg tablet, Take 1 tablet (100 mcg total) by mouth daily, Disp: 30 tablet, Rfl: 3  •  levothyroxine 88 mcg tablet, TAKE 1 TABLET BY MOUTH EVERY DAY, Disp: 90 tablet, Rfl: 1  •  Prenatal Vit-Fe Fumarate-FA (PRENATAL VITAMIN PO), Take 1 tablet by mouth in the morning, Disp: , Rfl:      ALLERGIES:  Allergies   Allergen Reactions   • Strawberry Extract - Food Allergy Swelling        ACTIVE  PROBLEMS:  Patient Active Problem List   Diagnosis   • ADD (attention deficit disorder) without hyperactivity   • Malignant melanoma of shoulder, right (HCC)   • Family history of breast cancer   • High risk medications (not anticoagulants) long-term use   • Immunotherapy   • Hypothyroidism   • First trimester bleeding   • Cancer complicating pregnancy in second trimester   • Abnormal MSAFP (maternal serum alpha-fetoprotein), elevated   • Thyroid disease affecting pregnancy   • Encounter for follow-up surveillance of melanoma          PAST MEDICAL HISTORY:   Past Medical History:   Diagnosis Date   • Cancer (HCC)     Skin cancer   • Immunotherapy 2021   • Lymphoma (HCC)    • Melanoma (HCC)         PAST SURGICAL HISTORY:  Past Surgical History:   Procedure Laterality Date   • LYMPH NODE BIOPSY Right 2021    Procedure: BIOPSY LYMPH NODE SENTINEL, INTRAOPERATIVE .LYMPHATIC MAPPING, LYMPHOSCINTIGRAPHY (NUC MED INJECT AT 0730);  Surgeon: Nicole Fonseca MD;  Location: [facility];  Service: Surgical Oncology   • SKIN LESION EXCISION Right 2021    Procedure: EXCISION WIDE LESION UPPER EXTREMITY;  Surgeon: Nicole Fonseca MD;  Location: [facility];  Service: Surgical Oncology   • SPLIT THICKNESS SKIN GRAFT Right 2021    Procedure: RIGHT SHOULDER  COMPLEX CLOSURE;  Surgeon: Felipe Landa MD;  Location: [facility];  Service: Plastics   • WISDOM TOOTH EXTRACTION          SOCIAL HISTORY:  Social History     Socioeconomic History   • Marital status: Single     Spouse name: None   • Number of children: None   • Years of education: None   • Highest education level: None   Occupational History   • None   Tobacco Use   • Smoking status: Former     Current packs/day: 0.00     Average packs/day: 0.3 packs/day for 1 year (0.3 ttl pk-yrs)     Types: Cigarettes     Start date:      Quit date: 2018     Years since quittin.0   • Smokeless tobacco: Never   • Tobacco comments:     about 2-3 years  ago    Vaping Use   • Vaping status: Never Used   Substance and Sexual Activity   • Alcohol use: Yes     Alcohol/week: 1.0 standard drink of alcohol     Types: 1 Cans of beer per week     Comment: rare   • Drug use: No   • Sexual activity: Yes     Partners: Male   Other Topics Concern   • None   Social History Narrative    Denied history of daily coffee consumption      Social Determinants of Health     Financial Resource Strain: Low Risk  (3/29/2023)    Received from [facility]    Overall Financial Resource Strain (CARDIA)    • Difficulty of Paying Living Expenses: Not very hard   Food Insecurity: No Food Insecurity (3/29/2023)    Received from [facility]    Hunger Vital Sign    • Worried About Running Out of Food in the Last Year: Never true    • Ran Out of Food in the Last Year: Never true   Transportation Needs: No Transportation Needs (3/29/2023)    Received from [facility]    PRAPARE - Transportation    • Lack of Transportation (Medical): No    • Lack of Transportation (Non-Medical): No   Physical Activity: Not on file   Stress: Not on file   Social Connections: Not on file   Intimate Partner Violence: Not At Risk (3/29/2023)    Received from [facility]    Humiliation, Afraid, Rape, and Kick questionnaire    • Fear of Current or Ex-Partner: No    • Emotionally Abused: No    • Physically Abused: No    • Sexually Abused: No   Housing Stability: Low Risk  (3/29/2023)    Received from [facility]    Housing Stability Vital Sign    • Unable to Pay for Housing in the Last Year: No    • Number of Places Lived in the Last Year: 1    • Unstable Housing in the Last Year: No        FAMILY HISTORY:  Family History   Problem Relation Age of Onset   • No Known Problems Mother    • Breast cancer Maternal Grandmother    • Breast cancer Maternal Aunt    • Hypothyroidism Maternal Uncle            Objective    PHYSICAL EXAMINATION:   Blood  "pressure 122/70, pulse 70, temperature 98 °F (36.7 °C), resp. rate 18, height 5' 7\" (1.702 m), weight 72.1 kg (159 lb), SpO2 99%, unknown if currently breastfeeding.     Pain Score: 0-No pain     ECOG Performance Status      Flowsheet Row Most Recent Value   ECOG Performance Status 0 - Fully active, able to carry on all pre-disease performance without restriction               Physical Exam  Constitutional:       General: She is not in acute distress.     Appearance: Normal appearance. She is not ill-appearing or toxic-appearing.   HENT:      Head: Normocephalic and atraumatic.      Right Ear: External ear normal.      Left Ear: External ear normal.      Nose: Nose normal.      Mouth/Throat:      Mouth: Mucous membranes are moist.      Pharynx: Oropharynx is clear.   Eyes:      General: No scleral icterus.        Right eye: No discharge.         Left eye: No discharge.      Conjunctiva/sclera: Conjunctivae normal.   Cardiovascular:      Rate and Rhythm: Normal rate and regular rhythm.      Pulses: Normal pulses.      Heart sounds: No murmur heard.     No friction rub. No gallop.   Pulmonary:      Effort: Pulmonary effort is normal. No respiratory distress.      Breath sounds: Normal breath sounds. No wheezing or rales.   Abdominal:      General: Bowel sounds are normal. There is no distension.      Palpations: There is no mass.      Tenderness: There is no abdominal tenderness. There is no rebound.   Musculoskeletal:         General: No swelling or tenderness.      Cervical back: Normal range of motion. No rigidity.      Right lower leg: No edema.      Left lower leg: No edema.   Lymphadenopathy:      Head:      Right side of head: No submandibular, preauricular or posterior auricular adenopathy.      Left side of head: No submandibular, preauricular or posterior auricular adenopathy.      Cervical: No cervical adenopathy.      Right cervical: No superficial or posterior cervical adenopathy.     Left cervical: No " superficial or posterior cervical adenopathy.      Upper Body:      Right upper body: No supraclavicular or axillary adenopathy.      Left upper body: No supraclavicular or axillary adenopathy.   Skin:     General: Skin is warm.      Coloration: Skin is not jaundiced.      Findings: No lesion or rash.      Comments: No concerning skin lesions   Neurological:      General: No focal deficit present.      Mental Status: She is alert and oriented to person, place, and time.      Cranial Nerves: No cranial nerve deficit.      Motor: No weakness.      Gait: Gait normal.   Psychiatric:         Mood and Affect: Mood normal.         Behavior: Behavior normal.         Thought Content: Thought content normal.         Judgment: Judgment normal.         I reviewed lab data in the chart.    WBC   Date Value Ref Range Status   09/29/2023 5.33 4.31 - 10.16 Thousand/uL Final   06/13/2023 8.66 4.31 - 10.16 Thousand/uL Final   10/14/2022 7.68 4.31 - 10.16 Thousand/uL Final     Hemoglobin   Date Value Ref Range Status   09/29/2023 14.4 11.5 - 15.4 g/dL Final   06/13/2023 14.2 11.5 - 15.4 g/dL Final   10/14/2022 12.2 11.5 - 15.4 g/dL Final     Platelets   Date Value Ref Range Status   09/29/2023 285 149 - 390 Thousands/uL Final   06/13/2023 311 149 - 390 Thousands/uL Final   10/14/2022 254 149 - 390 Thousands/uL Final     MCV   Date Value Ref Range Status   09/29/2023 93 82 - 98 fL Final   06/13/2023 87 82 - 98 fL Final   10/14/2022 88 82 - 98 fL Final      Potassium   Date Value Ref Range Status   09/29/2023 4.4 3.5 - 5.3 mmol/L Final   06/13/2023 4.5 3.5 - 5.3 mmol/L Final   10/14/2022 4.3 3.5 - 5.3 mmol/L Final     Chloride   Date Value Ref Range Status   09/29/2023 107 96 - 108 mmol/L Final   06/13/2023 111 (H) 96 - 108 mmol/L Final   10/14/2022 109 (H) 96 - 108 mmol/L Final     CO2   Date Value Ref Range Status   09/29/2023 29 21 - 32 mmol/L Final   06/13/2023 28 21 - 32 mmol/L Final   10/14/2022 23 21 - 32 mmol/L Final     BUN    Date Value Ref Range Status   09/29/2023 13 5 - 25 mg/dL Final   06/13/2023 13 5 - 25 mg/dL Final   10/14/2022 9 5 - 25 mg/dL Final     Creatinine   Date Value Ref Range Status   09/29/2023 0.98 0.60 - 1.30 mg/dL Final     Comment:     Standardized to IDMS reference method   06/13/2023 0.88 0.60 - 1.30 mg/dL Final     Comment:     Standardized to IDMS reference method   10/14/2022 0.57 (L) 0.60 - 1.30 mg/dL Final     Comment:     Standardized to IDMS reference method     Glucose   Date Value Ref Range Status   07/18/2022 82 65 - 140 mg/dL Final     Comment:     If the patient is fasting, the ADA then defines impaired fasting glucose as > 100 mg/dL and diabetes as > or equal to 123 mg/dL.  Specimen collection should occur prior to Sulfasalazine administration due to the potential for falsely depressed results. Specimen collection should occur prior to Sulfapyridine administration due to the potential for falsely elevated results.   06/14/2022 91 65 - 140 mg/dL Final     Comment:     If the patient is fasting, the ADA then defines impaired fasting glucose as > 100 mg/dL and diabetes as > or equal to 123 mg/dL.  Specimen collection should occur prior to Sulfasalazine administration due to the potential for falsely depressed results. Specimen collection should occur prior to Sulfapyridine administration due to the potential for falsely elevated results.   02/02/2022 122 65 - 140 mg/dL Final     Comment:     If the patient is fasting, the ADA then defines impaired fasting glucose as > 100 mg/dL and diabetes as > or equal to 123 mg/dL.  Specimen collection should occur prior to Sulfasalazine administration due to the potential for falsely depressed results. Specimen collection should occur prior to Sulfapyridine administration due to the potential for falsely elevated results.     Calcium   Date Value Ref Range Status   09/29/2023 9.5 8.4 - 10.2 mg/dL Final   06/13/2023 9.2 8.3 - 10.1 mg/dL Final   10/14/2022 8.8 8.3 -  10.1 mg/dL Final     Albumin   Date Value Ref Range Status   09/29/2023 4.4 3.5 - 5.0 g/dL Final   06/13/2023 3.7 3.5 - 5.0 g/dL Final   10/14/2022 2.8 (L) 3.5 - 5.0 g/dL Final     Total Bilirubin   Date Value Ref Range Status   09/29/2023 1.33 (H) 0.20 - 1.00 mg/dL Final     Comment:     Use of this assay is not recommended for patients undergoing treatment with eltrombopag due to the potential for falsely elevated results.  N-acetyl-p-benzoquinone imine (metabolite of Acetaminophen) will generate erroneously low results in samples for patients that have taken an overdose of Acetaminophen.   06/13/2023 0.85 0.20 - 1.00 mg/dL Final     Comment:     Use of this assay is not recommended for patients undergoing treatment with eltrombopag due to the potential for falsely elevated results.   10/14/2022 1.18 (H) 0.20 - 1.00 mg/dL Final     Comment:     Use of this assay is not recommended for patients undergoing treatment with eltrombopag due to the potential for falsely elevated results.     Alkaline Phosphatase   Date Value Ref Range Status   09/29/2023 85 34 - 104 U/L Final   06/13/2023 106 46 - 116 U/L Final   10/14/2022 56 46 - 116 U/L Final     AST   Date Value Ref Range Status   09/29/2023 20 13 - 39 U/L Final   06/13/2023 23 5 - 45 U/L Final     Comment:     Specimen collection should occur prior to Sulfasalazine administration due to the potential for falsely depressed results.    10/14/2022 9 5 - 45 U/L Final     Comment:     Specimen collection should occur prior to Sulfasalazine administration due to the potential for falsely depressed results.      ALT   Date Value Ref Range Status   09/29/2023 12 7 - 52 U/L Final     Comment:     Specimen collection should occur prior to Sulfasalazine administration due to the potential for falsely depressed results.    06/13/2023 32 12 - 78 U/L Final     Comment:     Specimen collection should occur prior to Sulfasalazine and/or Sulfapyridine administration due to the  "potential for falsely depressed results.    10/14/2022 13 12 - 78 U/L Final     Comment:     Specimen collection should occur prior to Sulfasalazine and/or Sulfapyridine administration due to the potential for falsely depressed results.       LD   Date Value Ref Range Status   09/29/2023 168 140 - 271 U/L Final   06/13/2023 176 81 - 234 U/L Final   10/14/2022 179 81 - 234 U/L Final     No results found for: \"TSH\"  No results found for: \"[id number]\"   Free T4   Date Value Ref Range Status   09/29/2023 0.93 0.61 - 1.12 ng/dL Final     Comment:     Specimens with biotin concentrations > 10 ng/mL can lead to significant (> 10%) positive bias in result.   07/31/2023 0.84 0.61 - 1.12 ng/dL Final     Comment:     Specimens with biotin concentrations > 10 ng/mL can lead to significant (> 10%) positive bias in result.   06/13/2023 0.87 0.61 - 1.12 ng/dL Final     Comment:     Specimens with biotin concentrations > 10 ng/mL can lead to significant (> 10%) positive bias in result.         RECENT IMAGING:  Procedure: CT chest abdomen pelvis w contrast    Result Date: 1/12/2024  Narrative: CT CHEST, ABDOMEN AND PELVIS WITH IV CONTRAST INDICATION: C43.61: Malignant melanoma of right upper limb, including shoulder Z29.89: Encounter for other specified prophylactic measures E03.9: Hypothyroidism, unspecified Z79.899: Other long term (current) drug therapy. COMPARISON: None. TECHNIQUE: CT examination of the chest, abdomen and pelvis was performed. Multiplanar 2D reformatted images were created from the source data. This examination, like all CT scans performed in the [facility], was performed utilizing techniques to minimize radiation dose exposure, including the use of iterative reconstruction and automated exposure control. Radiation dose length product (DLP) for this visit:  1619.15 mGy-cm IV Contrast: 100 mL of iohexol (OMNIPAQUE) Enteric Contrast: Enteric contrast was administered. FINDINGS: CHEST LUNGS: Lungs are " clear. There is no tracheal or endobronchial lesion. PLEURA: Unremarkable. HEART/GREAT VESSELS: Heart is unremarkable for patient's age. No thoracic aortic aneurysm. MEDIASTINUM AND SUNNY: Unremarkable. CHEST WALL AND LOWER NECK: Atrophic thyroid. No axillary adenopathy. ABDOMEN LIVER/BILIARY TREE: Unremarkable. GALLBLADDER: No calcified gallstones. No pericholecystic inflammatory change. SPLEEN: Unremarkable. PANCREAS: Unremarkable. ADRENAL GLANDS: Unremarkable. KIDNEYS/URETERS: Unremarkable. No hydronephrosis. STOMACH AND BOWEL: Unremarkable. APPENDIX: Noninflamed. ABDOMINOPELVIC CAVITY: No ascites.  No pneumoperitoneum.  No lymphadenopathy. VESSELS: Unremarkable for patient's age. PELVIS REPRODUCTIVE ORGANS: Unremarkable for patient's age. URINARY BLADDER: Unremarkable. ABDOMINAL WALL/INGUINAL REGIONS: Tiny uncomplicated fat-containing umbilical hernia. OSSEOUS STRUCTURES: No acute fracture or destructive osseous lesion.     Impression: No signs of metastatic disease to the chest, abdomen or pelvis. Workstation performed: [id number]            Assessment/Plan  Ms. Rivera is a 33-year-old female with stage IIb melanoma status post 6 cycles of adjuvant pembrolizumab here for continued monitoring follow-up and surveillance.    1. Malignant melanoma of shoulder, right (HCC)  2. Encounter for follow-up surveillance of melanoma  She is doing well with no evidence of melanoma recurrence or metastasis either clinically or by imaging.  She will get blood work within the next week or 2.  She will continue close monitoring and follow-up.  We discussed that she is approximately 2-1/2 years out from diagnosis.  We will continue every 3-month visits until 3 years out from diagnosis when we will then switch over to every 6-month visit.  All orders placed in prescription provided for blood work to be done now and in 3 months at her return visit.  She does need a new dermatologist given insurance change.  I have placed a referral  for dermatology.    She knows to call with issues or concerns prior to her next visit.    - CBC and differential; Standing  - Comprehensive metabolic panel; Standing  - LD,Blood; Standing  - T3, free; Standing  - T4, free; Standing  - TSH, 3rd generation; Standing  - CBC and differential  - Comprehensive metabolic panel  - LD,Blood  - T3, free  - T4, free  - TSH, 3rd generation  - Ambulatory Referral to Dermatology; Future        3. High risk medications (not anticoagulants) long-term use  4. Immunotherapy  5. Hypothyroidism, unspecified type  Had received 6 cycles of pembrolizumab prior to holding treatment.  She did develop hypothyroidism.  She is currently on levothyroxine.  We will continue to monitor TSH and adjust levothyroxine as needed.  She will continue to monitor for any immune mediated side effects that may occur with late onset.  She knows to call with issues or concerns prior to her next visit.        Return in about 3 months (around 4/17/2024) for Office Visit, labs.     Ary Gaona MD, PhD

## 2024-01-17 NOTE — PROGRESS NOTES
[facility]  [address] [address]  [phone]     Date of Visit: 1/17/2024  Name: Tamiko Rivera   YOB: 1990        Subjective    VISIT DIAGNOSIS:  Diagnoses and all orders for this visit:    Malignant melanoma of shoulder, right (HCC)  -     CBC and differential; Standing  -     Comprehensive metabolic panel; Standing  -     LD,Blood; Standing  -     T3, free; Standing  -     T4, free; Standing  -     TSH, 3rd generation; Standing  -     CBC and differential  -     Comprehensive metabolic panel  -     LD,Blood  -     T3, free  -     T4, free  -     TSH, 3rd generation  -     Ambulatory Referral to Dermatology; Future    Encounter for follow-up surveillance of melanoma    High risk medications (not anticoagulants) long-term use  -     CBC and differential; Standing  -     Comprehensive metabolic panel; Standing  -     LD,Blood; Standing  -     T3, free; Standing  -     T4, free; Standing  -     TSH, 3rd generation; Standing  -     CBC and differential  -     Comprehensive metabolic panel  -     LD,Blood  -     T3, free  -     T4, free  -     TSH, 3rd generation  -     Ambulatory Referral to Dermatology; Future    Immunotherapy    Hypothyroidism, unspecified type        Oncology History   Malignant melanoma of shoulder, right (HCC)   7/8/2021 Biopsy    Right anterior shoulder:  - Ulcerated malignant melanoma, 2.5mm    Ulceration: Yes  Mitosis: >1    NGS Testing  MS-Stable  TMB - 13Muts/Mb  CDKN2A E10  TERT promoter -124C>T       7/8/2021 Genomic Testing    DecisionDx - Class 2B     7/8/2021 -  Cancer Staged    Staging form: Melanoma of the Skin, AJCC 8th Edition  - Pathologic stage from 7/8/2021: Stage IIB (pT3b, pN0, cM0) - Signed by Ary Gaona MD on 9/20/2021  Stage prefix: Initial diagnosis       8/18/2021 Surgery    Wide excision melanoma with sentinel lymph node biopsy  No residual melanoma  LNs negative - 0/9 LNs      11/17/2021 - 6/16/2022 Chemotherapy    pembrolizumab (KEYTRUDA) IVPB, 400 mg, Intravenous, Once, 6 of 9 cycles  Administration: 400 mg (11/17/2021), 400 mg (12/29/2021), 400 mg (2/7/2022), 400 mg (3/21/2022), 400 mg (5/2/2022), 400 mg (6/16/2022)        Cancer Staging   Malignant melanoma of shoulder, right (HCC)  Staging form: Melanoma of the Skin, AJCC 8th Edition  - Pathologic stage from 7/8/2021: Stage IIB (pT3b, pN0, cM0) - Signed by Ary Gaona MD on 9/20/2021     Treatment Details   Treatment goal Curative   Plan Name OP Pembrolizumab Q 42 Days   Status Inactive   Start Date 11/17/2021   End Date 6/16/2022   Provider Ary Gaona MD   Chemotherapy pembrolizumab (KEYTRUDA) IVPB, 400 mg, Intravenous, Once, 6 of 9 cycles  Administration: 400 mg (11/17/2021), 400 mg (12/29/2021), 400 mg (2/7/2022), 400 mg (3/21/2022), 400 mg (5/2/2022), 400 mg (6/16/2022)          HISTORY OF PRESENT ILLNESS: Tamiko Rivera is a 33 y.o. female  who 2B melanoma status post 6 cycles of adjuvant pembrolizumab here for continued monitoring, follow-up and surveillance.    She is doing well.  Energy is okay, as expected with 2 young children.  Eating okay.  No issues or concerns or complaints today.  No new, changing, concerning skin lesions.  No lymphadenopathy.  She was unable to get blood work prior to today's visit.    We did review her imaging that she had done on 1/5/2024 with a CT chest, abdomen and pelvis which demonstrates no evidence of melanoma recurrence or metastasis.  There is note of an atrophic thyroid and uncomplicated fat-containing umbilical hernia.        REVIEW OF SYSTEMS:  Review of Systems   Constitutional:  Negative for appetite change, fatigue, fever and unexpected weight change.   HENT:   Negative for lump/mass, trouble swallowing and voice change.    Eyes:  Negative for icterus.   Respiratory:  Negative for cough, shortness of breath and wheezing.    Cardiovascular:  Negative for leg swelling.    Gastrointestinal:  Negative for abdominal pain, constipation, diarrhea, nausea and vomiting.   Genitourinary:  Negative for difficulty urinating and hematuria.    Musculoskeletal:  Negative for arthralgias, gait problem and myalgias.   Skin:  Negative for itching and rash.        No new, changing, or concerning lesions.   Neurological:  Negative for extremity weakness, gait problem, headaches, light-headedness and numbness.   Hematological:  Negative for adenopathy.        MEDICATIONS:    Current Outpatient Medications:     betamethasone dipropionate (DIPROSONE) 0.05 % cream, Apply topically 2 (two) times a day, Disp: 30 g, Rfl: 0    clindamycin (CLEOCIN T) 1 % lotion, Apply to face, Disp: , Rfl:     docusate sodium (COLACE) 250 MG capsule, Take 250 mg by mouth daily Patient reports she is taking off brand colace (not sure of the exact name) PRN (Patient not taking: Reported on 7/25/2023), Disp: , Rfl:     levothyroxine (Euthyrox) 100 mcg tablet, Take 1 tablet (100 mcg total) by mouth daily, Disp: 30 tablet, Rfl: 3    levothyroxine 88 mcg tablet, TAKE 1 TABLET BY MOUTH EVERY DAY, Disp: 90 tablet, Rfl: 1    Prenatal Vit-Fe Fumarate-FA (PRENATAL VITAMIN PO), Take 1 tablet by mouth in the morning, Disp: , Rfl:      ALLERGIES:  Allergies   Allergen Reactions    Strawberry Extract - Food Allergy Swelling        ACTIVE PROBLEMS:  Patient Active Problem List   Diagnosis    ADD (attention deficit disorder) without hyperactivity    Malignant melanoma of shoulder, right (HCC)    Family history of breast cancer    High risk medications (not anticoagulants) long-term use    Immunotherapy    Hypothyroidism    First trimester bleeding    Cancer complicating pregnancy in second trimester    Abnormal MSAFP (maternal serum alpha-fetoprotein), elevated    Thyroid disease affecting pregnancy    Encounter for follow-up surveillance of melanoma          PAST MEDICAL HISTORY:   Past Medical History:   Diagnosis Date    Cancer (HCC)      Skin cancer    Immunotherapy 2021    Lymphoma (HCC)     Melanoma (HCC)         PAST SURGICAL HISTORY:  Past Surgical History:   Procedure Laterality Date    LYMPH NODE BIOPSY Right 2021    Procedure: BIOPSY LYMPH NODE SENTINEL, INTRAOPERATIVE .LYMPHATIC MAPPING, LYMPHOSCINTIGRAPHY (NUC MED INJECT AT 0730);  Surgeon: Nicole Fonseca MD;  Location: [facility];  Service: Surgical Oncology    SKIN LESION EXCISION Right 2021    Procedure: EXCISION WIDE LESION UPPER EXTREMITY;  Surgeon: Nicole Fonseca MD;  Location: [facility];  Service: Surgical Oncology    SPLIT THICKNESS SKIN GRAFT Right 2021    Procedure: RIGHT SHOULDER  COMPLEX CLOSURE;  Surgeon: Felipe Landa MD;  Location: [facility];  Service: Plastics    WISDOM TOOTH EXTRACTION          SOCIAL HISTORY:  Social History     Socioeconomic History    Marital status: Single     Spouse name: None    Number of children: None    Years of education: None    Highest education level: None   Occupational History    None   Tobacco Use    Smoking status: Former     Current packs/day: 0.00     Average packs/day: 0.3 packs/day for 1 year (0.3 ttl pk-yrs)     Types: Cigarettes     Start date:      Quit date: 2018     Years since quittin.0    Smokeless tobacco: Never    Tobacco comments:     about 2-3 years ago    Vaping Use    Vaping status: Never Used   Substance and Sexual Activity    Alcohol use: Yes     Alcohol/week: 1.0 standard drink of alcohol     Types: 1 Cans of beer per week     Comment: rare    Drug use: No    Sexual activity: Yes     Partners: Male   Other Topics Concern    None   Social History Narrative    Denied history of daily coffee consumption      Social Determinants of Health     Financial Resource Strain: Low Risk  (3/29/2023)    Received from [facility]    Overall Financial Resource Strain (CARDIA)     Difficulty of Paying Living Expenses: Not very hard   Food Insecurity: No Food Insecurity  "(3/29/2023)    Received from [facility]    Hunger Vital Sign     Worried About Running Out of Food in the Last Year: Never true     Ran Out of Food in the Last Year: Never true   Transportation Needs: No Transportation Needs (3/29/2023)    Received from [facility]    PRAPARE - Transportation     Lack of Transportation (Medical): No     Lack of Transportation (Non-Medical): No   Physical Activity: Not on file   Stress: Not on file   Social Connections: Not on file   Intimate Partner Violence: Not At Risk (3/29/2023)    Received from [facility]    Humiliation, Afraid, Rape, and Kick questionnaire     Fear of Current or Ex-Partner: No     Emotionally Abused: No     Physically Abused: No     Sexually Abused: No   Housing Stability: Low Risk  (3/29/2023)    Received from [facility]    Housing Stability Vital Sign     Unable to Pay for Housing in the Last Year: No     Number of Places Lived in the Last Year: 1     Unstable Housing in the Last Year: No        FAMILY HISTORY:  Family History   Problem Relation Age of Onset    No Known Problems Mother     Breast cancer Maternal Grandmother     Breast cancer Maternal Aunt     Hypothyroidism Maternal Uncle            Objective    PHYSICAL EXAMINATION:   Blood pressure 122/70, pulse 70, temperature 98 °F (36.7 °C), resp. rate 18, height 5' 7\" (1.702 m), weight 72.1 kg (159 lb), SpO2 99%, unknown if currently breastfeeding.     Pain Score: 0-No pain     ECOG Performance Status      Flowsheet Row Most Recent Value   ECOG Performance Status 0 - Fully active, able to carry on all pre-disease performance without restriction               Physical Exam  Constitutional:       General: She is not in acute distress.     Appearance: Normal appearance. She is not ill-appearing or toxic-appearing.   HENT:      Head: Normocephalic and atraumatic.      Right Ear: External ear normal.      Left Ear: External ear normal. "      Nose: Nose normal.      Mouth/Throat:      Mouth: Mucous membranes are moist.      Pharynx: Oropharynx is clear.   Eyes:      General: No scleral icterus.        Right eye: No discharge.         Left eye: No discharge.      Conjunctiva/sclera: Conjunctivae normal.   Cardiovascular:      Rate and Rhythm: Normal rate and regular rhythm.      Pulses: Normal pulses.      Heart sounds: No murmur heard.     No friction rub. No gallop.   Pulmonary:      Effort: Pulmonary effort is normal. No respiratory distress.      Breath sounds: Normal breath sounds. No wheezing or rales.   Abdominal:      General: Bowel sounds are normal. There is no distension.      Palpations: There is no mass.      Tenderness: There is no abdominal tenderness. There is no rebound.   Musculoskeletal:         General: No swelling or tenderness.      Cervical back: Normal range of motion. No rigidity.      Right lower leg: No edema.      Left lower leg: No edema.   Lymphadenopathy:      Head:      Right side of head: No submandibular, preauricular or posterior auricular adenopathy.      Left side of head: No submandibular, preauricular or posterior auricular adenopathy.      Cervical: No cervical adenopathy.      Right cervical: No superficial or posterior cervical adenopathy.     Left cervical: No superficial or posterior cervical adenopathy.      Upper Body:      Right upper body: No supraclavicular or axillary adenopathy.      Left upper body: No supraclavicular or axillary adenopathy.   Skin:     General: Skin is warm.      Coloration: Skin is not jaundiced.      Findings: No lesion or rash.      Comments: No concerning skin lesions   Neurological:      General: No focal deficit present.      Mental Status: She is alert and oriented to person, place, and time.      Cranial Nerves: No cranial nerve deficit.      Motor: No weakness.      Gait: Gait normal.   Psychiatric:         Mood and Affect: Mood normal.         Behavior: Behavior normal.          Thought Content: Thought content normal.         Judgment: Judgment normal.         I reviewed lab data in the chart.    WBC   Date Value Ref Range Status   09/29/2023 5.33 4.31 - 10.16 Thousand/uL Final   06/13/2023 8.66 4.31 - 10.16 Thousand/uL Final   10/14/2022 7.68 4.31 - 10.16 Thousand/uL Final     Hemoglobin   Date Value Ref Range Status   09/29/2023 14.4 11.5 - 15.4 g/dL Final   06/13/2023 14.2 11.5 - 15.4 g/dL Final   10/14/2022 12.2 11.5 - 15.4 g/dL Final     Platelets   Date Value Ref Range Status   09/29/2023 285 149 - 390 Thousands/uL Final   06/13/2023 311 149 - 390 Thousands/uL Final   10/14/2022 254 149 - 390 Thousands/uL Final     MCV   Date Value Ref Range Status   09/29/2023 93 82 - 98 fL Final   06/13/2023 87 82 - 98 fL Final   10/14/2022 88 82 - 98 fL Final      Potassium   Date Value Ref Range Status   09/29/2023 4.4 3.5 - 5.3 mmol/L Final   06/13/2023 4.5 3.5 - 5.3 mmol/L Final   10/14/2022 4.3 3.5 - 5.3 mmol/L Final     Chloride   Date Value Ref Range Status   09/29/2023 107 96 - 108 mmol/L Final   06/13/2023 111 (H) 96 - 108 mmol/L Final   10/14/2022 109 (H) 96 - 108 mmol/L Final     CO2   Date Value Ref Range Status   09/29/2023 29 21 - 32 mmol/L Final   06/13/2023 28 21 - 32 mmol/L Final   10/14/2022 23 21 - 32 mmol/L Final     BUN   Date Value Ref Range Status   09/29/2023 13 5 - 25 mg/dL Final   06/13/2023 13 5 - 25 mg/dL Final   10/14/2022 9 5 - 25 mg/dL Final     Creatinine   Date Value Ref Range Status   09/29/2023 0.98 0.60 - 1.30 mg/dL Final     Comment:     Standardized to IDMS reference method   06/13/2023 0.88 0.60 - 1.30 mg/dL Final     Comment:     Standardized to IDMS reference method   10/14/2022 0.57 (L) 0.60 - 1.30 mg/dL Final     Comment:     Standardized to IDMS reference method     Glucose   Date Value Ref Range Status   07/18/2022 82 65 - 140 mg/dL Final     Comment:     If the patient is fasting, the ADA then defines impaired fasting glucose as > 100 mg/dL and  diabetes as > or equal to 123 mg/dL.  Specimen collection should occur prior to Sulfasalazine administration due to the potential for falsely depressed results. Specimen collection should occur prior to Sulfapyridine administration due to the potential for falsely elevated results.   06/14/2022 91 65 - 140 mg/dL Final     Comment:     If the patient is fasting, the ADA then defines impaired fasting glucose as > 100 mg/dL and diabetes as > or equal to 123 mg/dL.  Specimen collection should occur prior to Sulfasalazine administration due to the potential for falsely depressed results. Specimen collection should occur prior to Sulfapyridine administration due to the potential for falsely elevated results.   02/02/2022 122 65 - 140 mg/dL Final     Comment:     If the patient is fasting, the ADA then defines impaired fasting glucose as > 100 mg/dL and diabetes as > or equal to 123 mg/dL.  Specimen collection should occur prior to Sulfasalazine administration due to the potential for falsely depressed results. Specimen collection should occur prior to Sulfapyridine administration due to the potential for falsely elevated results.     Calcium   Date Value Ref Range Status   09/29/2023 9.5 8.4 - 10.2 mg/dL Final   06/13/2023 9.2 8.3 - 10.1 mg/dL Final   10/14/2022 8.8 8.3 - 10.1 mg/dL Final     Albumin   Date Value Ref Range Status   09/29/2023 4.4 3.5 - 5.0 g/dL Final   06/13/2023 3.7 3.5 - 5.0 g/dL Final   10/14/2022 2.8 (L) 3.5 - 5.0 g/dL Final     Total Bilirubin   Date Value Ref Range Status   09/29/2023 1.33 (H) 0.20 - 1.00 mg/dL Final     Comment:     Use of this assay is not recommended for patients undergoing treatment with eltrombopag due to the potential for falsely elevated results.  N-acetyl-p-benzoquinone imine (metabolite of Acetaminophen) will generate erroneously low results in samples for patients that have taken an overdose of Acetaminophen.   06/13/2023 0.85 0.20 - 1.00 mg/dL Final     Comment:     Use  "of this assay is not recommended for patients undergoing treatment with eltrombopag due to the potential for falsely elevated results.   10/14/2022 1.18 (H) 0.20 - 1.00 mg/dL Final     Comment:     Use of this assay is not recommended for patients undergoing treatment with eltrombopag due to the potential for falsely elevated results.     Alkaline Phosphatase   Date Value Ref Range Status   09/29/2023 85 34 - 104 U/L Final   06/13/2023 106 46 - 116 U/L Final   10/14/2022 56 46 - 116 U/L Final     AST   Date Value Ref Range Status   09/29/2023 20 13 - 39 U/L Final   06/13/2023 23 5 - 45 U/L Final     Comment:     Specimen collection should occur prior to Sulfasalazine administration due to the potential for falsely depressed results.    10/14/2022 9 5 - 45 U/L Final     Comment:     Specimen collection should occur prior to Sulfasalazine administration due to the potential for falsely depressed results.      ALT   Date Value Ref Range Status   09/29/2023 12 7 - 52 U/L Final     Comment:     Specimen collection should occur prior to Sulfasalazine administration due to the potential for falsely depressed results.    06/13/2023 32 12 - 78 U/L Final     Comment:     Specimen collection should occur prior to Sulfasalazine and/or Sulfapyridine administration due to the potential for falsely depressed results.    10/14/2022 13 12 - 78 U/L Final     Comment:     Specimen collection should occur prior to Sulfasalazine and/or Sulfapyridine administration due to the potential for falsely depressed results.       LD   Date Value Ref Range Status   09/29/2023 168 140 - 271 U/L Final   06/13/2023 176 81 - 234 U/L Final   10/14/2022 179 81 - 234 U/L Final     No results found for: \"TSH\"  No results found for: \"D5IJJOE\"   Free T4   Date Value Ref Range Status   09/29/2023 0.93 0.61 - 1.12 ng/dL Final     Comment:     Specimens with biotin concentrations > 10 ng/mL can lead to significant (> 10%) positive bias in result.   07/31/2023 " 0.84 0.61 - 1.12 ng/dL Final     Comment:     Specimens with biotin concentrations > 10 ng/mL can lead to significant (> 10%) positive bias in result.   06/13/2023 0.87 0.61 - 1.12 ng/dL Final     Comment:     Specimens with biotin concentrations > 10 ng/mL can lead to significant (> 10%) positive bias in result.         RECENT IMAGING:  Procedure: CT chest abdomen pelvis w contrast    Result Date: 1/12/2024  Narrative: CT CHEST, ABDOMEN AND PELVIS WITH IV CONTRAST INDICATION: C43.61: Malignant melanoma of right upper limb, including shoulder Z29.89: Encounter for other specified prophylactic measures E03.9: Hypothyroidism, unspecified Z79.899: Other long term (current) drug therapy. COMPARISON: None. TECHNIQUE: CT examination of the chest, abdomen and pelvis was performed. Multiplanar 2D reformatted images were created from the source data. This examination, like all CT scans performed in the [facility], was performed utilizing techniques to minimize radiation dose exposure, including the use of iterative reconstruction and automated exposure control. Radiation dose length product (DLP) for this visit:  1619.15 mGy-cm IV Contrast: 100 mL of iohexol (OMNIPAQUE) Enteric Contrast: Enteric contrast was administered. FINDINGS: CHEST LUNGS: Lungs are clear. There is no tracheal or endobronchial lesion. PLEURA: Unremarkable. HEART/GREAT VESSELS: Heart is unremarkable for patient's age. No thoracic aortic aneurysm. MEDIASTINUM AND SUNNY: Unremarkable. CHEST WALL AND LOWER NECK: Atrophic thyroid. No axillary adenopathy. ABDOMEN LIVER/BILIARY TREE: Unremarkable. GALLBLADDER: No calcified gallstones. No pericholecystic inflammatory change. SPLEEN: Unremarkable. PANCREAS: Unremarkable. ADRENAL GLANDS: Unremarkable. KIDNEYS/URETERS: Unremarkable. No hydronephrosis. STOMACH AND BOWEL: Unremarkable. APPENDIX: Noninflamed. ABDOMINOPELVIC CAVITY: No ascites.  No pneumoperitoneum.  No lymphadenopathy. VESSELS:  Unremarkable for patient's age. PELVIS REPRODUCTIVE ORGANS: Unremarkable for patient's age. URINARY BLADDER: Unremarkable. ABDOMINAL WALL/INGUINAL REGIONS: Tiny uncomplicated fat-containing umbilical hernia. OSSEOUS STRUCTURES: No acute fracture or destructive osseous lesion.     Impression: No signs of metastatic disease to the chest, abdomen or pelvis. Workstation performed: [id number]            Assessment/Plan  Ms. Rivera is a 33-year-old female with stage IIb melanoma status post 6 cycles of adjuvant pembrolizumab here for continued monitoring follow-up and surveillance.    1. Malignant melanoma of shoulder, right (HCC)  2. Encounter for follow-up surveillance of melanoma  She is doing well with no evidence of melanoma recurrence or metastasis either clinically or by imaging.  She will get blood work within the next week or 2.  She will continue close monitoring and follow-up.  We discussed that she is approximately 2-1/2 years out from diagnosis.  We will continue every 3-month visits until 3 years out from diagnosis when we will then switch over to every 6-month visit.  All orders placed in prescription provided for blood work to be done now and in 3 months at her return visit.  She does need a new dermatologist given insurance change.  I have placed a referral for dermatology.    She knows to call with issues or concerns prior to her next visit.    - CBC and differential; Standing  - Comprehensive metabolic panel; Standing  - LD,Blood; Standing  - T3, free; Standing  - T4, free; Standing  - TSH, 3rd generation; Standing  - CBC and differential  - Comprehensive metabolic panel  - LD,Blood  - T3, free  - T4, free  - TSH, 3rd generation  - Ambulatory Referral to Dermatology; Future        3. High risk medications (not anticoagulants) long-term use  4. Immunotherapy  5. Hypothyroidism, unspecified type  Had received 6 cycles of pembrolizumab prior to holding treatment.  She did develop hypothyroidism.  She is  currently on levothyroxine.  We will continue to monitor TSH and adjust levothyroxine as needed.  She will continue to monitor for any immune mediated side effects that may occur with late onset.  She knows to call with issues or concerns prior to her next visit.        Return in about 3 months (around 4/17/2024) for Office Visit, labs.     Ary Gaona MD, PhD

## 2024-01-26 ENCOUNTER — APPOINTMENT (OUTPATIENT)
Dept: LAB | Facility: MEDICAL CENTER | Age: 34
End: 2024-01-26
Payer: COMMERCIAL

## 2024-01-26 DIAGNOSIS — Z29.89 IMMUNOTHERAPY: ICD-10-CM

## 2024-01-26 DIAGNOSIS — Z79.899 HIGH RISK MEDICATIONS (NOT ANTICOAGULANTS) LONG-TERM USE: ICD-10-CM

## 2024-01-26 DIAGNOSIS — E03.9 HYPOTHYROIDISM, UNSPECIFIED TYPE: ICD-10-CM

## 2024-01-26 DIAGNOSIS — C43.61 MALIGNANT MELANOMA OF SHOULDER, RIGHT (HCC): ICD-10-CM

## 2024-01-26 LAB
ALBUMIN SERPL BCP-MCNC: 4.4 G/DL (ref 3.5–5)
ALP SERPL-CCNC: 76 U/L (ref 34–104)
ALT SERPL W P-5'-P-CCNC: 9 U/L (ref 7–52)
ANION GAP SERPL CALCULATED.3IONS-SCNC: 4 MMOL/L
AST SERPL W P-5'-P-CCNC: 14 U/L (ref 13–39)
BASOPHILS # BLD AUTO: 0.07 THOUSANDS/ÂΜL (ref 0–0.1)
BASOPHILS NFR BLD AUTO: 1 % (ref 0–1)
BILIRUB SERPL-MCNC: 1.01 MG/DL (ref 0.2–1)
BUN SERPL-MCNC: 15 MG/DL (ref 5–25)
CALCIUM SERPL-MCNC: 9.1 MG/DL (ref 8.4–10.2)
CHLORIDE SERPL-SCNC: 106 MMOL/L (ref 96–108)
CO2 SERPL-SCNC: 29 MMOL/L (ref 21–32)
CREAT SERPL-MCNC: 0.84 MG/DL (ref 0.6–1.3)
EOSINOPHIL # BLD AUTO: 0.21 THOUSAND/ÂΜL (ref 0–0.61)
EOSINOPHIL NFR BLD AUTO: 3 % (ref 0–6)
ERYTHROCYTE [DISTWIDTH] IN BLOOD BY AUTOMATED COUNT: 11.9 % (ref 11.6–15.1)
GFR SERPL CREATININE-BSD FRML MDRD: 92 ML/MIN/1.73SQ M
GLUCOSE P FAST SERPL-MCNC: 68 MG/DL (ref 65–99)
HCT VFR BLD AUTO: 43.1 % (ref 34.8–46.1)
HGB BLD-MCNC: 13.9 G/DL (ref 11.5–15.4)
IMM GRANULOCYTES # BLD AUTO: 0.01 THOUSAND/UL (ref 0–0.2)
IMM GRANULOCYTES NFR BLD AUTO: 0 % (ref 0–2)
LDH SERPL-CCNC: 179 U/L (ref 140–271)
LYMPHOCYTES # BLD AUTO: 2.47 THOUSANDS/ÂΜL (ref 0.6–4.47)
LYMPHOCYTES NFR BLD AUTO: 36 % (ref 14–44)
MCH RBC QN AUTO: 30.4 PG (ref 26.8–34.3)
MCHC RBC AUTO-ENTMCNC: 32.3 G/DL (ref 31.4–37.4)
MCV RBC AUTO: 94 FL (ref 82–98)
MONOCYTES # BLD AUTO: 0.49 THOUSAND/ÂΜL (ref 0.17–1.22)
MONOCYTES NFR BLD AUTO: 7 % (ref 4–12)
NEUTROPHILS # BLD AUTO: 3.56 THOUSANDS/ÂΜL (ref 1.85–7.62)
NEUTS SEG NFR BLD AUTO: 53 % (ref 43–75)
NRBC BLD AUTO-RTO: 0 /100 WBCS
PLATELET # BLD AUTO: 360 THOUSANDS/UL (ref 149–390)
PMV BLD AUTO: 9.2 FL (ref 8.9–12.7)
POTASSIUM SERPL-SCNC: 4.2 MMOL/L (ref 3.5–5.3)
PROT SERPL-MCNC: 7.3 G/DL (ref 6.4–8.4)
RBC # BLD AUTO: 4.57 MILLION/UL (ref 3.81–5.12)
SODIUM SERPL-SCNC: 139 MMOL/L (ref 135–147)
T3FREE SERPL-MCNC: 3.24 PG/ML (ref 2.5–3.9)
T4 FREE SERPL-MCNC: 0.83 NG/DL (ref 0.61–1.12)
TSH SERPL DL<=0.05 MIU/L-ACNC: 7.55 UIU/ML (ref 0.45–4.5)
WBC # BLD AUTO: 6.81 THOUSAND/UL (ref 4.31–10.16)

## 2024-01-26 PROCEDURE — 80053 COMPREHEN METABOLIC PANEL: CPT

## 2024-01-26 PROCEDURE — 83615 LACTATE (LD) (LDH) ENZYME: CPT

## 2024-01-26 PROCEDURE — 84439 ASSAY OF FREE THYROXINE: CPT

## 2024-01-26 PROCEDURE — 36415 COLL VENOUS BLD VENIPUNCTURE: CPT

## 2024-01-26 PROCEDURE — 85025 COMPLETE CBC W/AUTO DIFF WBC: CPT

## 2024-01-26 PROCEDURE — 84443 ASSAY THYROID STIM HORMONE: CPT

## 2024-01-26 PROCEDURE — 84481 FREE ASSAY (FT-3): CPT

## 2024-02-02 ENCOUNTER — TELEPHONE (OUTPATIENT)
Dept: HEMATOLOGY ONCOLOGY | Facility: CLINIC | Age: 34
End: 2024-02-02

## 2024-02-02 DIAGNOSIS — Z29.89 IMMUNOTHERAPY: ICD-10-CM

## 2024-02-02 DIAGNOSIS — E03.9 HYPOTHYROIDISM, UNSPECIFIED TYPE: Primary | ICD-10-CM

## 2024-02-02 RX ORDER — LEVOTHYROXINE SODIUM 0.12 MG/1
125 TABLET ORAL DAILY
Qty: 30 TABLET | Refills: 3 | Status: SHIPPED | OUTPATIENT
Start: 2024-02-02

## 2024-02-02 NOTE — TELEPHONE ENCOUNTER
Patient Call    Who are you speaking with? Patient    If it is not the patient, are they listed on an active communication consent form? N/A   What is the reason for this call? Patient calling to speak with Terrie molina regarding the dosage she is currently taking for  Levothyroxine    Does this require a call back? Yes   If a call back is required, please list best call back number [phone]   If a call back is required, advise that a message will be forwarded to their care team and someone will return their call as soon as possible.   Did you relay this information to the patient? yes

## 2024-02-02 NOTE — TELEPHONE ENCOUNTER
Left VM for patient to confirm which Levothyroxine dose she has previously been taking. Left direct teams number for call back

## 2024-02-02 NOTE — TELEPHONE ENCOUNTER
Medication Refill Request   Who are you speaking with? Patient   If it is not the patient, are they listed on an active communication consent form?     N/A   Which medication is being requested for refill?  Please list medication name and dosage  Levothyroxine 100 mcg    How many pills does the patient have left?  4   Preferred Pharmacy / Address  [facility]/pharmacy #[id number] - [place] - [address]   [address], [place] [address]    Who is the prescribing provider? Dr. Wilson   Call back number  [phone]   Relevant Information  Wants to know if she needs a new dosage due to her recent lab work

## 2024-02-02 NOTE — TELEPHONE ENCOUNTER
Spoke with patient to let her know I reviewed with Dr. Gaona and she would like to increase her levothyroxine to 125mcg which I will send a script for. I went over taking the medication on an empty stomach by itself in the morning one hour before any food or drinks. Pt verbalized understanding and in agreement with plan

## 2024-02-16 ENCOUNTER — TELEPHONE (OUTPATIENT)
Age: 34
End: 2024-02-16

## 2024-02-22 ENCOUNTER — TELEPHONE (OUTPATIENT)
Age: 34
End: 2024-02-22

## 2024-02-22 NOTE — TELEPHONE ENCOUNTER
Patient [id number]     Insurance Referral    [facility]    Reason:Skin Check    [phone]    Appointment for next Friday.

## 2024-02-28 NOTE — TELEPHONE ENCOUNTER
Pt called in with the following details needed to issue insurance referral as follows:    NPI NO. [id number]  ADDRESS - [address]                       [address] [id number]  DX CODE: [id number] / [id number]    Thanks

## 2024-04-22 ENCOUNTER — TELEPHONE (OUTPATIENT)
Dept: HEMATOLOGY ONCOLOGY | Facility: CLINIC | Age: 34
End: 2024-04-22

## 2024-04-22 ENCOUNTER — TELEPHONE (OUTPATIENT)
Age: 34
End: 2024-04-22

## 2024-04-22 NOTE — TELEPHONE ENCOUNTER
Patient is requesting an insurance referral for the following specialty: Hematology/Oncology     Test Name / Order Name: [id number]    DX Code: C43.61, Z08, Z85.820,     Date Of Service: 4/24/2024 at 3:40 pm    Location/Facility Name/Address/Phone #:   [facility] - [facility]  [address] - [address] [place] [address]    Location / Facility NPI: [id number]    [facility] Phone # To Reach The Patient: [phone]    Please fax referral to: [phone]

## 2024-04-22 NOTE — TELEPHONE ENCOUNTER
Called PCP Terrence Vargas to get a referral from [facility] for appt Weds 4/24 with Dr Gaona. PCP is going to get referral faxed over to [facility]

## 2024-04-24 ENCOUNTER — OFFICE VISIT (OUTPATIENT)
Dept: HEMATOLOGY ONCOLOGY | Facility: CLINIC | Age: 34
End: 2024-04-24
Payer: COMMERCIAL

## 2024-04-24 VITALS
HEIGHT: 67 IN | TEMPERATURE: 98.1 F | HEART RATE: 58 BPM | WEIGHT: 150 LBS | RESPIRATION RATE: 16 BRPM | DIASTOLIC BLOOD PRESSURE: 64 MMHG | OXYGEN SATURATION: 98 % | SYSTOLIC BLOOD PRESSURE: 118 MMHG | BODY MASS INDEX: 23.54 KG/M2

## 2024-04-24 DIAGNOSIS — C43.61 MALIGNANT MELANOMA OF SHOULDER, RIGHT (HCC): ICD-10-CM

## 2024-04-24 DIAGNOSIS — E03.9 HYPOTHYROIDISM, UNSPECIFIED TYPE: ICD-10-CM

## 2024-04-24 DIAGNOSIS — Z29.89 IMMUNOTHERAPY: ICD-10-CM

## 2024-04-24 DIAGNOSIS — Z08 ENCOUNTER FOR FOLLOW-UP SURVEILLANCE OF MELANOMA: Primary | ICD-10-CM

## 2024-04-24 DIAGNOSIS — Z85.820 ENCOUNTER FOR FOLLOW-UP SURVEILLANCE OF MELANOMA: Primary | ICD-10-CM

## 2024-04-24 DIAGNOSIS — Z79.899 HIGH RISK MEDICATIONS (NOT ANTICOAGULANTS) LONG-TERM USE: ICD-10-CM

## 2024-04-24 PROCEDURE — 99214 OFFICE O/P EST MOD 30 MIN: CPT | Performed by: INTERNAL MEDICINE

## 2024-04-24 NOTE — ASSESSMENT & PLAN NOTE
The patient is on treatment with immunotherapy and we will continue to monitor for side effects and lab abnormalities. We will monitor labs with each treatment to ensure safety of continuing on treatment. The patient knows to watch for signs and symptoms for immune-mediated side effects.  Denies any immune-mediated side effects at this time.

## 2024-04-24 NOTE — ASSESSMENT & PLAN NOTE
She is doing well with no clinical evidence of melanoma recurrence or metastasis. She was unable to get blood work prior to today, so we will have her get blood work following this appointment. Orders placed and scripts provided for blood work now and her return in 3 months. She knows to continue to monitor for any new, changing, concerning skin lesions or lymphadenopathy. She knows to continue to monitor for any delayed onset of immune-mediated side effects. She knows to call with issues or concerns prior to her next visit. She will return in 3 months.

## 2024-04-24 NOTE — PROGRESS NOTES
[facility]  [address] [facility] [address]  [phone]  [phone]     Date of Visit: 4/24/2024  Name: Tamiko Rivera   YOB: 1990       Subjective    VISIT DIAGNOSIS:  Diagnoses and all orders for this visit:    Encounter for follow-up surveillance of melanoma  -     CBC and differential; Future  -     Comprehensive metabolic panel; Future  -     LD,Blood; Future  -     T3, free; Future  -     T4, free; Future  -     TSH, 3rd generation; Future  -     CT chest abdomen pelvis w contrast; Future  -     CBC and differential  -     Comprehensive metabolic panel  -     LD,Blood  -     T3, free  -     T4, free  -     TSH, 3rd generation    Malignant melanoma of shoulder, right (HCC)  -     Ambulatory Referral to Hematology / Oncology  -     CBC and differential; Future  -     Comprehensive metabolic panel; Future  -     LD,Blood; Future  -     T3, free; Future  -     T4, free; Future  -     TSH, 3rd generation; Future  -     CT chest abdomen pelvis w contrast; Future  -     CBC and differential  -     Comprehensive metabolic panel  -     LD,Blood  -     T3, free  -     T4, free  -     TSH, 3rd generation    High risk medications (not anticoagulants) long-term use  -     CBC and differential; Future  -     Comprehensive metabolic panel; Future  -     LD,Blood; Future  -     T3, free; Future  -     T4, free; Future  -     TSH, 3rd generation; Future  -     CT chest abdomen pelvis w contrast; Future  -     CBC and differential  -     Comprehensive metabolic panel  -     LD,Blood  -     T3, free  -     T4, free  -     TSH, 3rd generation    Immunotherapy  -     CBC and differential; Future  -     Comprehensive metabolic panel; Future  -     LD,Blood; Future  -     T3, free; Future  -     T4, free; Future  -     TSH, 3rd generation; Future  -     CT chest abdomen pelvis w contrast; Future  -     CBC and differential  -     Comprehensive metabolic panel  -      LD,Blood  -     T3, free  -     T4, free  -     TSH, 3rd generation    Hypothyroidism, unspecified type  -     CBC and differential; Future  -     Comprehensive metabolic panel; Future  -     LD,Blood; Future  -     T3, free; Future  -     T4, free; Future  -     TSH, 3rd generation; Future  -     CT chest abdomen pelvis w contrast; Future  -     CBC and differential  -     Comprehensive metabolic panel  -     LD,Blood  -     T3, free  -     T4, free  -     TSH, 3rd generation        Oncology History   Malignant melanoma of shoulder, right (HCC)   7/8/2021 Biopsy    Right anterior shoulder:  - Ulcerated malignant melanoma, 2.5mm    Ulceration: Yes  Mitosis: >1    NGS Testing  MS-Stable  TMB - 13Muts/Mb  CDKN2A E10  TERT promoter -124C>T       7/8/2021 Genomic Testing    DecisionDx - Class 2B     7/8/2021 -  Cancer Staged    Staging form: Melanoma of the Skin, AJCC 8th Edition  - Pathologic stage from 7/8/2021: Stage IIB (pT3b, pN0, cM0) - Signed by Ary Gaona MD on 9/20/2021  Stage prefix: Initial diagnosis       8/18/2021 Surgery    Wide excision melanoma with sentinel lymph node biopsy  No residual melanoma  LNs negative - 0/9 LNs     11/17/2021 - 6/16/2022 Chemotherapy    pembrolizumab (KEYTRUDA) IVPB, 400 mg, Intravenous, Once, 6 of 9 cycles  Administration: 400 mg (11/17/2021), 400 mg (12/29/2021), 400 mg (2/7/2022), 400 mg (3/21/2022), 400 mg (5/2/2022), 400 mg (6/16/2022)        Cancer Staging   Malignant melanoma of shoulder, right (HCC)  Staging form: Melanoma of the Skin, AJCC 8th Edition  - Pathologic stage from 7/8/2021: Stage IIB (pT3b, pN0, cM0) - Signed by Ary Gaona MD on 9/20/2021     Treatment Details   Treatment goal Curative   Plan Name OP Pembrolizumab Q 42 Days   Status Inactive   Start Date 11/17/2021   End Date 6/16/2022   Provider Ary Gaona MD   Chemotherapy pembrolizumab (KEYTRUDA) IVPB, 400 mg, Intravenous, Once, 6 of 9 cycles  Administration: 400 mg (11/17/2021), 400  mg (12/29/2021), 400 mg (2/7/2022), 400 mg (3/21/2022), 400 mg (5/2/2022), 400 mg (6/16/2022)          HISTORY OF PRESENT ILLNESS: Tamiko Rivera is a 33 y.o. female with stage IIB melanoma, who received 6 cycles of adjuvant pembrolizumab from 11/17/2021 until 06/16/2022 when she found out she was pregnant. She is here for follow-up, monitoring, and surveillance.    The patient continues to consult with dermatology and reports no current concerns regarding her skin. She denies the presence of any abnormal growths, headaches, double vision, rash, pruritus, chest pain, dyspnea, abdominal pain, nausea, vomiting, diarrhea, or difficulty transitioning from a seated position. Her energy level is good. She goes to the gym 3 times a week. Her appetite is good. She denies pain or tenderness.    The patient has not yet undergone a thyroid function test. She reports no adverse effects from her levothyroxine treatment.        Review of Systems   Constitutional:  Negative for appetite change, fatigue, fever and unexpected weight change.   HENT:   Negative for lump/mass, trouble swallowing and voice change.    Eyes:  Negative for icterus.   Respiratory:  Negative for cough, shortness of breath and wheezing.    Cardiovascular:  Negative for leg swelling.   Gastrointestinal:  Negative for abdominal pain, constipation, diarrhea, nausea and vomiting.   Genitourinary:  Negative for difficulty urinating and hematuria.    Musculoskeletal:  Negative for arthralgias, gait problem and myalgias.   Skin:  Negative for itching and rash.        No new, changing, or concerning lesions.   Neurological:  Negative for extremity weakness, gait problem, headaches, light-headedness and numbness.   Hematological:  Negative for adenopathy.        MEDICATIONS:    Current Outpatient Medications:     betamethasone dipropionate (DIPROSONE) 0.05 % cream, Apply topically 2 (two) times a day, Disp: 30 g, Rfl: 0    clindamycin (CLEOCIN T) 1 % lotion, Apply to  face, Disp: , Rfl:     levothyroxine (Euthyrox) 125 mcg tablet, Take 1 tablet (125 mcg total) by mouth daily, Disp: 30 tablet, Rfl: 3    docusate sodium (COLACE) 250 MG capsule, Take 250 mg by mouth daily Patient reports she is taking off brand colace (not sure of the exact name) PRN (Patient not taking: Reported on 7/25/2023), Disp: , Rfl:     levothyroxine (Euthyrox) 100 mcg tablet, Take 1 tablet (100 mcg total) by mouth daily (Patient not taking: Reported on 4/24/2024), Disp: 30 tablet, Rfl: 3    levothyroxine 88 mcg tablet, TAKE 1 TABLET BY MOUTH EVERY DAY (Patient not taking: Reported on 4/24/2024), Disp: 90 tablet, Rfl: 1    Prenatal Vit-Fe Fumarate-FA (PRENATAL VITAMIN PO), Take 1 tablet by mouth in the morning (Patient not taking: Reported on 4/24/2024), Disp: , Rfl:      ALLERGIES:  Allergies   Allergen Reactions    Strawberry Extract - Food Allergy Swelling        ACTIVE PROBLEMS:  Patient Active Problem List   Diagnosis    ADD (attention deficit disorder) without hyperactivity    Malignant melanoma of shoulder, right (HCC)    Family history of breast cancer    High risk medications (not anticoagulants) long-term use    Immunotherapy    Hypothyroidism    First trimester bleeding    Cancer complicating pregnancy in second trimester    Abnormal MSAFP (maternal serum alpha-fetoprotein), elevated    Thyroid disease affecting pregnancy    Encounter for follow-up surveillance of melanoma          PAST MEDICAL HISTORY:   Past Medical History:   Diagnosis Date    Cancer (HCC)     Skin cancer    Immunotherapy 11/2021    Lymphoma (HCC)     Melanoma (HCC) 2021        PAST SURGICAL HISTORY:  Past Surgical History:   Procedure Laterality Date    LYMPH NODE BIOPSY Right 8/18/2021    Procedure: BIOPSY LYMPH NODE SENTINEL, INTRAOPERATIVE .LYMPHATIC MAPPING, LYMPHOSCINTIGRAPHY (NUC MED INJECT AT 0730);  Surgeon: Nicole Fonseca MD;  Location: [facility];  Service: Surgical Oncology    SKIN LESION EXCISION Right 8/18/2021     Procedure: EXCISION WIDE LESION UPPER EXTREMITY;  Surgeon: Nicole Fonseca MD;  Location: [facility];  Service: Surgical Oncology    SPLIT THICKNESS SKIN GRAFT Right 2021    Procedure: RIGHT SHOULDER  COMPLEX CLOSURE;  Surgeon: Felipe Landa MD;  Location: [facility];  Service: Plastics    WISDOM TOOTH EXTRACTION          SOCIAL HISTORY:  Social History     Socioeconomic History    Marital status: Single     Spouse name: Not on file    Number of children: Not on file    Years of education: Not on file    Highest education level: Not on file   Occupational History    Not on file   Tobacco Use    Smoking status: Former     Current packs/day: 0.00     Average packs/day: 0.3 packs/day for 1 year (0.3 ttl pk-yrs)     Types: Cigarettes     Start date:      Quit date: 2018     Years since quittin.3    Smokeless tobacco: Never    Tobacco comments:     about 2-3 years ago    Vaping Use    Vaping status: Never Used   Substance and Sexual Activity    Alcohol use: Yes     Alcohol/week: 1.0 standard drink of alcohol     Types: 1 Cans of beer per week     Comment: rare    Drug use: No    Sexual activity: Yes     Partners: Male   Other Topics Concern    Not on file   Social History Narrative    Denied history of daily coffee consumption      Social Determinants of Health     Financial Resource Strain: Low Risk  (3/29/2023)    Received from [facility]    Overall Financial Resource Strain (CARDIA)     Difficulty of Paying Living Expenses: Not very hard   Food Insecurity: No Food Insecurity (3/29/2023)    Received from [facility]    Hunger Vital Sign     Worried About Running Out of Food in the Last Year: Never true     Ran Out of Food in the Last Year: Never true   Transportation Needs: No Transportation Needs (3/29/2023)    Received from [facility]    PRAPARE - Transportation     Lack of Transportation (Medical): No     Lack of Transportation  "(Non-Medical): No   Physical Activity: Not on file   Stress: Not on file   Social Connections: Not on file   Intimate Partner Violence: Not At Risk (3/29/2023)    Received from [facility]    Humiliation, Afraid, Rape, and Kick questionnaire     Fear of Current or Ex-Partner: No     Emotionally Abused: No     Physically Abused: No     Sexually Abused: No   Housing Stability: Low Risk  (3/29/2023)    Received from [facility]    Housing Stability Vital Sign     Unable to Pay for Housing in the Last Year: No     Number of Places Lived in the Last Year: 1     Unstable Housing in the Last Year: No        FAMILY HISTORY:  Family History   Problem Relation Age of Onset    No Known Problems Mother     Breast cancer Maternal Grandmother     Breast cancer Maternal Aunt     Hypothyroidism Maternal Uncle           Objective    PHYSICAL EXAMINATION:   Blood pressure 118/64, pulse 58, temperature 98.1 °F (36.7 °C), temperature source Temporal, resp. rate 16, height 5' 7\", weight 68 kg (150 lb), SpO2 98%, unknown if currently breastfeeding.     Pain Score: 0-No pain     ECOG Performance Status      Flowsheet Row Most Recent Value   ECOG Performance Status 0 - Fully active, able to carry on all pre-disease performance without restriction               Physical Exam  Constitutional:       General: She is not in acute distress.     Appearance: Normal appearance. She is not ill-appearing or toxic-appearing.   HENT:      Head: Normocephalic and atraumatic.      Right Ear: External ear normal.      Left Ear: External ear normal.      Nose: Nose normal.      Mouth/Throat:      Mouth: Mucous membranes are moist.      Pharynx: Oropharynx is clear.   Eyes:      General: No scleral icterus.        Right eye: No discharge.         Left eye: No discharge.      Conjunctiva/sclera: Conjunctivae normal.   Cardiovascular:      Rate and Rhythm: Normal rate and regular rhythm.      Pulses: Normal pulses.      Heart " sounds: No murmur heard.     No friction rub. No gallop.   Pulmonary:      Effort: Pulmonary effort is normal. No respiratory distress.      Breath sounds: Normal breath sounds. No wheezing or rales.   Abdominal:      General: Bowel sounds are normal. There is no distension.      Palpations: There is no mass.      Tenderness: There is no abdominal tenderness. There is no rebound.   Musculoskeletal:         General: No swelling or tenderness.      Cervical back: Normal range of motion. No rigidity.      Right lower leg: No edema.      Left lower leg: No edema.   Lymphadenopathy:      Head:      Right side of head: No submandibular, preauricular or posterior auricular adenopathy.      Left side of head: No submandibular, preauricular or posterior auricular adenopathy.      Cervical: No cervical adenopathy.      Right cervical: No superficial or posterior cervical adenopathy.     Left cervical: No superficial or posterior cervical adenopathy.      Upper Body:      Right upper body: No supraclavicular or axillary adenopathy.      Left upper body: No supraclavicular or axillary adenopathy.      Lower Body: No right inguinal adenopathy. No left inguinal adenopathy.   Skin:     General: Skin is warm.      Coloration: Skin is not jaundiced.      Findings: No lesion or rash.      Comments: Well healed surgical scar.  No evidence of recurrence at primary site.   Neurological:      General: No focal deficit present.      Mental Status: She is alert and oriented to person, place, and time.      Cranial Nerves: No cranial nerve deficit.      Motor: No weakness.      Gait: Gait normal.   Psychiatric:         Mood and Affect: Mood normal.         Behavior: Behavior normal.         Thought Content: Thought content normal.         Judgment: Judgment normal.         I have personally reviewed results with the patient.    WBC   Date Value Ref Range Status   01/26/2024 6.81 4.31 - 10.16 Thousand/uL Final   09/29/2023 5.33 4.31 - 10.16  Thousand/uL Final   06/13/2023 8.66 4.31 - 10.16 Thousand/uL Final     Hemoglobin   Date Value Ref Range Status   01/26/2024 13.9 11.5 - 15.4 g/dL Final   09/29/2023 14.4 11.5 - 15.4 g/dL Final   06/13/2023 14.2 11.5 - 15.4 g/dL Final     Platelets   Date Value Ref Range Status   01/26/2024 360 149 - 390 Thousands/uL Final   09/29/2023 285 149 - 390 Thousands/uL Final   06/13/2023 311 149 - 390 Thousands/uL Final     MCV   Date Value Ref Range Status   01/26/2024 94 82 - 98 fL Final   09/29/2023 93 82 - 98 fL Final   06/13/2023 87 82 - 98 fL Final      Potassium   Date Value Ref Range Status   01/26/2024 4.2 3.5 - 5.3 mmol/L Final   09/29/2023 4.4 3.5 - 5.3 mmol/L Final   06/13/2023 4.5 3.5 - 5.3 mmol/L Final   08/04/2021 4.7 3.5 - 5.2 mmol/L Final     Chloride   Date Value Ref Range Status   01/26/2024 106 96 - 108 mmol/L Final   09/29/2023 107 96 - 108 mmol/L Final   06/13/2023 111 (H) 96 - 108 mmol/L Final   08/04/2021 111 (H) 100 - 109 mmol/L Final     Carbon Dioxide   Date Value Ref Range Status   08/04/2021 26 23 - 31 mmol/L Final     CO2   Date Value Ref Range Status   01/26/2024 29 21 - 32 mmol/L Final   09/29/2023 29 21 - 32 mmol/L Final   06/13/2023 28 21 - 32 mmol/L Final     BUN   Date Value Ref Range Status   01/26/2024 15 5 - 25 mg/dL Final   09/29/2023 13 5 - 25 mg/dL Final   06/13/2023 13 5 - 25 mg/dL Final   08/04/2021 14 7 - 25 mg/dL Final     Creatinine   Date Value Ref Range Status   01/26/2024 0.84 0.60 - 1.30 mg/dL Final     Comment:     Standardized to IDMS reference method   09/29/2023 0.98 0.60 - 1.30 mg/dL Final     Comment:     Standardized to IDMS reference method   06/13/2023 0.88 0.60 - 1.30 mg/dL Final     Comment:     Standardized to IDMS reference method   08/04/2021 0.80 0.40 - 1.10 mg/dL Final     Glucose   Date Value Ref Range Status   07/18/2022 82 65 - 140 mg/dL Final     Comment:     If the patient is fasting, the ADA then defines impaired fasting glucose as > 100 mg/dL and  diabetes as > or equal to 123 mg/dL.  Specimen collection should occur prior to Sulfasalazine administration due to the potential for falsely depressed results. Specimen collection should occur prior to Sulfapyridine administration due to the potential for falsely elevated results.   06/14/2022 91 65 - 140 mg/dL Final     Comment:     If the patient is fasting, the ADA then defines impaired fasting glucose as > 100 mg/dL and diabetes as > or equal to 123 mg/dL.  Specimen collection should occur prior to Sulfasalazine administration due to the potential for falsely depressed results. Specimen collection should occur prior to Sulfapyridine administration due to the potential for falsely elevated results.   02/02/2022 122 65 - 140 mg/dL Final     Comment:     If the patient is fasting, the ADA then defines impaired fasting glucose as > 100 mg/dL and diabetes as > or equal to 123 mg/dL.  Specimen collection should occur prior to Sulfasalazine administration due to the potential for falsely depressed results. Specimen collection should occur prior to Sulfapyridine administration due to the potential for falsely elevated results.   08/04/2021 78 65 - 99 mg/dL Final     eGFR, Non-   Date Value Ref Range Status   08/04/2021 101 >60 Final     eGFR,    Date Value Ref Range Status   08/04/2021 117 >60 Final     Calcium   Date Value Ref Range Status   01/26/2024 9.1 8.4 - 10.2 mg/dL Final   09/29/2023 9.5 8.4 - 10.2 mg/dL Final   06/13/2023 9.2 8.3 - 10.1 mg/dL Final   08/04/2021 9.1 8.5 - 10.1 mg/dL Final     Albumin   Date Value Ref Range Status   01/26/2024 4.4 3.5 - 5.0 g/dL Final   09/29/2023 4.4 3.5 - 5.0 g/dL Final   06/13/2023 3.7 3.5 - 5.0 g/dL Final     ALBUMIN   Date Value Ref Range Status   08/04/2021 4.1 3.5 - 4.8 g/dL Final     Total Bilirubin   Date Value Ref Range Status   01/26/2024 1.01 (H) 0.20 - 1.00 mg/dL Final     Comment:     Use of this assay is not recommended for  patients undergoing treatment with eltrombopag due to the potential for falsely elevated results.  N-acetyl-p-benzoquinone imine (metabolite of Acetaminophen) will generate erroneously low results in samples for patients that have taken an overdose of Acetaminophen.   09/29/2023 1.33 (H) 0.20 - 1.00 mg/dL Final     Comment:     Use of this assay is not recommended for patients undergoing treatment with eltrombopag due to the potential for falsely elevated results.  N-acetyl-p-benzoquinone imine (metabolite of Acetaminophen) will generate erroneously low results in samples for patients that have taken an overdose of Acetaminophen.   06/13/2023 0.85 0.20 - 1.00 mg/dL Final     Comment:     Use of this assay is not recommended for patients undergoing treatment with eltrombopag due to the potential for falsely elevated results.   08/04/2021 0.8 0.2 - 1.0 mg/dL Final     Comment:     Use of this assay is not recommended for patients undergoing treatment with eltrombopag due to the potential for falsely elevated results.     Alkaline Phosphatase   Date Value Ref Range Status   01/26/2024 76 34 - 104 U/L Final   09/29/2023 85 34 - 104 U/L Final   06/13/2023 106 46 - 116 U/L Final   08/04/2021 103 35 - 120 U/L Final     AST   Date Value Ref Range Status   01/26/2024 14 13 - 39 U/L Final   09/29/2023 20 13 - 39 U/L Final   06/13/2023 23 5 - 45 U/L Final     Comment:     Specimen collection should occur prior to Sulfasalazine administration due to the potential for falsely depressed results.    08/04/2021 18 <41 U/L Final     ALT   Date Value Ref Range Status   01/26/2024 9 7 - 52 U/L Final     Comment:     Specimen collection should occur prior to Sulfasalazine administration due to the potential for falsely depressed results.    09/29/2023 12 7 - 52 U/L Final     Comment:     Specimen collection should occur prior to Sulfasalazine administration due to the potential for falsely depressed results.    06/13/2023 32 12 - 78  "U/L Final     Comment:     Specimen collection should occur prior to Sulfasalazine and/or Sulfapyridine administration due to the potential for falsely depressed results.    08/04/2021 20 <56 U/L Final      LD   Date Value Ref Range Status   01/26/2024 179 140 - 271 U/L Final   09/29/2023 168 140 - 271 U/L Final   06/13/2023 176 81 - 234 U/L Final     TSH   Date Value Ref Range Status   09/26/2022 1.37 0.36 - 3.74 uIU/mL Final     No results found for: \"[id number]\"   Free T4   Date Value Ref Range Status   01/26/2024 0.83 0.61 - 1.12 ng/dL Final     Comment:     Specimens with biotin concentrations > 10 ng/mL can lead to significant (> 10%) positive bias in result.   09/29/2023 0.93 0.61 - 1.12 ng/dL Final     Comment:     Specimens with biotin concentrations > 10 ng/mL can lead to significant (> 10%) positive bias in result.   07/31/2023 0.84 0.61 - 1.12 ng/dL Final     Comment:     Specimens with biotin concentrations > 10 ng/mL can lead to significant (> 10%) positive bias in result.         RECENT IMAGING:  No results found.       Assessment/Plan  Ms. Rivera is a 33-year-old female with stage IIB melanoma, here for continued monitoring, follow-up and surveillance.    1. Encounter for follow-up surveillance of melanoma  -     CBC and differential; Future; Expected date: 07/24/2024  -     Comprehensive metabolic panel; Future; Expected date: 07/24/2024  -     LD,Blood; Future; Expected date: 07/24/2024  -     T3, free; Future; Expected date: 07/24/2024  -     T4, free; Future; Expected date: 07/24/2024  -     TSH, 3rd generation; Future; Expected date: 07/24/2024  -     CT chest abdomen pelvis w contrast; Future; Expected date: 07/24/2024  -     CBC and differential  -     Comprehensive metabolic panel  -     LD,Blood  -     T3, free  -     T4, free  -     TSH, 3rd generation    2. Malignant melanoma of shoulder, right (HCC)  Assessment & Plan:  She is doing well with no clinical evidence of melanoma recurrence or " metastasis. She was unable to get blood work prior to today, so we will have her get blood work following this appointment. Orders placed and scripts provided for blood work now and her return in 3 months. She knows to continue to monitor for any new, changing, concerning skin lesions or lymphadenopathy. She knows to continue to monitor for any delayed onset of immune-mediated side effects. She knows to call with issues or concerns prior to her next visit. She will return in 3 months.    Orders:  -     Ambulatory Referral to Hematology / Oncology  -     CBC and differential; Future; Expected date: 07/24/2024  -     Comprehensive metabolic panel; Future; Expected date: 07/24/2024  -     LD,Blood; Future; Expected date: 07/24/2024  -     T3, free; Future; Expected date: 07/24/2024  -     T4, free; Future; Expected date: 07/24/2024  -     TSH, 3rd generation; Future; Expected date: 07/24/2024  -     CT chest abdomen pelvis w contrast; Future; Expected date: 07/24/2024  -     CBC and differential  -     Comprehensive metabolic panel  -     LD,Blood  -     T3, free  -     T4, free  -     TSH, 3rd generation    3. High risk medications (not anticoagulants) long-term use  Assessment & Plan:  The patient was on treatment with immunotherapy and we will continue to monitor for side effects and lab abnormalities. We will monitor labs with each visit as late onset immune mediated side effects can occur.  The patient knows to watch for signs and symptoms for immune-mediated side effects.  Denies any immune-mediated side effects at this time.    Orders:  -     CBC and differential; Future; Expected date: 07/24/2024  -     Comprehensive metabolic panel; Future; Expected date: 07/24/2024  -     LD,Blood; Future; Expected date: 07/24/2024  -     T3, free; Future; Expected date: 07/24/2024  -     T4, free; Future; Expected date: 07/24/2024  -     TSH, 3rd generation; Future; Expected date: 07/24/2024  -     CT chest abdomen pelvis w  contrast; Future; Expected date: 07/24/2024  -     CBC and differential  -     Comprehensive metabolic panel  -     LD,Blood  -     T3, free  -     T4, free  -     TSH, 3rd generation    4. Immunotherapy  -     CBC and differential; Future; Expected date: 07/24/2024  -     Comprehensive metabolic panel; Future; Expected date: 07/24/2024  -     LD,Blood; Future; Expected date: 07/24/2024  -     T3, free; Future; Expected date: 07/24/2024  -     T4, free; Future; Expected date: 07/24/2024  -     TSH, 3rd generation; Future; Expected date: 07/24/2024  -     CT chest abdomen pelvis w contrast; Future; Expected date: 07/24/2024  -     CBC and differential  -     Comprehensive metabolic panel  -     LD,Blood  -     T3, free  -     T4, free  -     TSH, 3rd generation    5. Hypothyroidism, unspecified type  Assessment & Plan:  We will continue to monitor her TSH, T3 and T4 as she did receive immunotherapy from 11/2021 until 06/2022, now almost 2 years ago. However, there can be delayed onset of immune-mediated side effects. 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    Orders:  -     CBC and differential; Future; Expected date: 07/24/2024  -     Comprehensive metabolic panel; Future; Expected date: 07/24/2024  -     LD,Blood; Future; Expected date: 07/24/2024  -     T3, free; Future; Expected date: 07/24/2024  -     T4, free; Future; Expected date: 07/24/2024  -     TSH, 3rd generation; Future; Expected date: 07/24/2024  -     CT chest abdomen pelvis w contrast; Future; Expected date: 07/24/2024  -     CBC and differential  -     Comprehensive metabolic panel  -     LD,Blood  -     T3, free  -     T4, free  -     TSH, 3rd generation       As planned, in three months.    Ary Gaona MD, PhD    Transcribed for Ary Gaona MD, by Willa Flores on 04/24/24 at 6:01 PM. Powered by [employer].

## 2024-04-24 NOTE — LETTER
April 28, 2024     Nicole Fonseca MD  [address] [facility] [address]  [facility] [address]    Patient: Tamiko Rivera   YOB: 1990   Date of Visit: 4/24/2024       Dear Dr. Fonseca:    Thank you for referring Tamiko Rivera to me for evaluation. Below are my notes for this consultation.    If you have questions, please do not hesitate to call me. I look forward to following your patient along with you.         Sincerely,        Ary Gaona MD        CC: DO Lyubov Jean Baptiste MD  [facility]  [place] MD Ary Isabel MD  4/28/2024 11:58 PM  Sign when Signing Visit  [facility] [place]  [address] [address]  [phone]  [phone]     Date of Visit: 4/24/2024  Name: Tamiko Rivera   YOB: 1990       Subjective    VISIT DIAGNOSIS:  Diagnoses and all orders for this visit:    Encounter for follow-up surveillance of melanoma  -     CBC and differential; Future  -     Comprehensive metabolic panel; Future  -     LD,Blood; Future  -     T3, free; Future  -     T4, free; Future  -     TSH, 3rd generation; Future  -     CT chest abdomen pelvis w contrast; Future  -     CBC and differential  -     Comprehensive metabolic panel  -     LD,Blood  -     T3, free  -     T4, free  -     TSH, 3rd generation    Malignant melanoma of shoulder, right (HCC)  -     Ambulatory Referral to Hematology / Oncology  -     CBC and differential; Future  -     Comprehensive metabolic panel; Future  -     LD,Blood; Future  -     T3, free; Future  -     T4, free; Future  -     TSH, 3rd generation; Future  -     CT chest abdomen pelvis w contrast; Future  -     CBC and differential  -     Comprehensive metabolic panel  -     LD,Blood  -     T3, free  -     T4, free  -     TSH, 3rd generation    High risk medications (not anticoagulants) long-term use  -     CBC and differential; Future  -     Comprehensive metabolic panel; Future  -      LD,Blood; Future  -     T3, free; Future  -     T4, free; Future  -     TSH, 3rd generation; Future  -     CT chest abdomen pelvis w contrast; Future  -     CBC and differential  -     Comprehensive metabolic panel  -     LD,Blood  -     T3, free  -     T4, free  -     TSH, 3rd generation    Immunotherapy  -     CBC and differential; Future  -     Comprehensive metabolic panel; Future  -     LD,Blood; Future  -     T3, free; Future  -     T4, free; Future  -     TSH, 3rd generation; Future  -     CT chest abdomen pelvis w contrast; Future  -     CBC and differential  -     Comprehensive metabolic panel  -     LD,Blood  -     T3, free  -     T4, free  -     TSH, 3rd generation    Hypothyroidism, unspecified type  -     CBC and differential; Future  -     Comprehensive metabolic panel; Future  -     LD,Blood; Future  -     T3, free; Future  -     T4, free; Future  -     TSH, 3rd generation; Future  -     CT chest abdomen pelvis w contrast; Future  -     CBC and differential  -     Comprehensive metabolic panel  -     LD,Blood  -     T3, free  -     T4, free  -     TSH, 3rd generation        Oncology History   Malignant melanoma of shoulder, right (HCC)   7/8/2021 Biopsy    Right anterior shoulder:  - Ulcerated malignant melanoma, 2.5mm    Ulceration: Yes  Mitosis: >1    NGS Testing  MS-Stable  TMB - 13Muts/Mb  CDKN2A E10  TERT promoter -124C>T       7/8/2021 Genomic Testing    DecisionDx - Class 2B     7/8/2021 -  Cancer Staged    Staging form: Melanoma of the Skin, AJCC 8th Edition  - Pathologic stage from 7/8/2021: Stage IIB (pT3b, pN0, cM0) - Signed by Ary Gaona MD on 9/20/2021  Stage prefix: Initial diagnosis       8/18/2021 Surgery    Wide excision melanoma with sentinel lymph node biopsy  No residual melanoma  LNs negative - 0/9 LNs     11/17/2021 - 6/16/2022 Chemotherapy    pembrolizumab (KEYTRUDA) IVPB, 400 mg, Intravenous, Once, 6 of 9 cycles  Administration: 400 mg (11/17/2021), 400 mg  (12/29/2021), 400 mg (2/7/2022), 400 mg (3/21/2022), 400 mg (5/2/2022), 400 mg (6/16/2022)        Cancer Staging   Malignant melanoma of shoulder, right (HCC)  Staging form: Melanoma of the Skin, AJCC 8th Edition  - Pathologic stage from 7/8/2021: Stage IIB (pT3b, pN0, cM0) - Signed by Ary Gaona MD on 9/20/2021     Treatment Details   Treatment goal Curative   Plan Name OP Pembrolizumab Q 42 Days   Status Inactive   Start Date 11/17/2021   End Date 6/16/2022   Provider Ary Gaona MD   Chemotherapy pembrolizumab (KEYTRUDA) IVPB, 400 mg, Intravenous, Once, 6 of 9 cycles  Administration: 400 mg (11/17/2021), 400 mg (12/29/2021), 400 mg (2/7/2022), 400 mg (3/21/2022), 400 mg (5/2/2022), 400 mg (6/16/2022)          HISTORY OF PRESENT ILLNESS: Tamiko Rivera is a 33 y.o. female with stage IIB melanoma, who received 6 cycles of adjuvant pembrolizumab from 11/17/2021 until 06/16/2022 when she found out she was pregnant. She is here for follow-up, monitoring, and surveillance.    The patient continues to consult with dermatology and reports no current concerns regarding her skin. She denies the presence of any abnormal growths, headaches, double vision, rash, pruritus, chest pain, dyspnea, abdominal pain, nausea, vomiting, diarrhea, or difficulty transitioning from a seated position. Her energy level is good. She goes to the gym 3 times a week. Her appetite is good. She denies pain or tenderness.    The patient has not yet undergone a thyroid function test. She reports no adverse effects from her levothyroxine treatment.        Review of Systems   Constitutional:  Negative for appetite change, fatigue, fever and unexpected weight change.   HENT:   Negative for lump/mass, trouble swallowing and voice change.    Eyes:  Negative for icterus.   Respiratory:  Negative for cough, shortness of breath and wheezing.    Cardiovascular:  Negative for leg swelling.   Gastrointestinal:  Negative for abdominal pain,  constipation, diarrhea, nausea and vomiting.   Genitourinary:  Negative for difficulty urinating and hematuria.    Musculoskeletal:  Negative for arthralgias, gait problem and myalgias.   Skin:  Negative for itching and rash.        No new, changing, or concerning lesions.   Neurological:  Negative for extremity weakness, gait problem, headaches, light-headedness and numbness.   Hematological:  Negative for adenopathy.        MEDICATIONS:    Current Outpatient Medications:   •  betamethasone dipropionate (DIPROSONE) 0.05 % cream, Apply topically 2 (two) times a day, Disp: 30 g, Rfl: 0  •  clindamycin (CLEOCIN T) 1 % lotion, Apply to face, Disp: , Rfl:   •  levothyroxine (Euthyrox) 125 mcg tablet, Take 1 tablet (125 mcg total) by mouth daily, Disp: 30 tablet, Rfl: 3  •  docusate sodium (COLACE) 250 MG capsule, Take 250 mg by mouth daily Patient reports she is taking off brand colace (not sure of the exact name) PRN (Patient not taking: Reported on 7/25/2023), Disp: , Rfl:   •  levothyroxine (Euthyrox) 100 mcg tablet, Take 1 tablet (100 mcg total) by mouth daily (Patient not taking: Reported on 4/24/2024), Disp: 30 tablet, Rfl: 3  •  levothyroxine 88 mcg tablet, TAKE 1 TABLET BY MOUTH EVERY DAY (Patient not taking: Reported on 4/24/2024), Disp: 90 tablet, Rfl: 1  •  Prenatal Vit-Fe Fumarate-FA (PRENATAL VITAMIN PO), Take 1 tablet by mouth in the morning (Patient not taking: Reported on 4/24/2024), Disp: , Rfl:      ALLERGIES:  Allergies   Allergen Reactions   • Strawberry Extract - Food Allergy Swelling        ACTIVE PROBLEMS:  Patient Active Problem List   Diagnosis   • ADD (attention deficit disorder) without hyperactivity   • Malignant melanoma of shoulder, right (HCC)   • Family history of breast cancer   • High risk medications (not anticoagulants) long-term use   • Immunotherapy   • Hypothyroidism   • First trimester bleeding   • Cancer complicating pregnancy in second trimester   • Abnormal MSAFP (maternal  serum alpha-fetoprotein), elevated   • Thyroid disease affecting pregnancy   • Encounter for follow-up surveillance of melanoma          PAST MEDICAL HISTORY:   Past Medical History:   Diagnosis Date   • Cancer (HCC)     Skin cancer   • Immunotherapy 2021   • Lymphoma (HCC)    • Melanoma (HCC)         PAST SURGICAL HISTORY:  Past Surgical History:   Procedure Laterality Date   • LYMPH NODE BIOPSY Right 2021    Procedure: BIOPSY LYMPH NODE SENTINEL, INTRAOPERATIVE .LYMPHATIC MAPPING, LYMPHOSCINTIGRAPHY (NUC MED INJECT AT 0730);  Surgeon: Nicole Fonseca MD;  Location: [facility];  Service: Surgical Oncology   • SKIN LESION EXCISION Right 2021    Procedure: EXCISION WIDE LESION UPPER EXTREMITY;  Surgeon: Nicole Fonseca MD;  Location: [facility];  Service: Surgical Oncology   • SPLIT THICKNESS SKIN GRAFT Right 2021    Procedure: RIGHT SHOULDER  COMPLEX CLOSURE;  Surgeon: Felipe Landa MD;  Location: [facility];  Service: Plastics   • WISDOM TOOTH EXTRACTION          SOCIAL HISTORY:  Social History     Socioeconomic History   • Marital status: Single     Spouse name: Not on file   • Number of children: Not on file   • Years of education: Not on file   • Highest education level: Not on file   Occupational History   • Not on file   Tobacco Use   • Smoking status: Former     Current packs/day: 0.00     Average packs/day: 0.3 packs/day for 1 year (0.3 ttl pk-yrs)     Types: Cigarettes     Start date:      Quit date: 2018     Years since quittin.3   • Smokeless tobacco: Never   • Tobacco comments:     about 2-3 years ago    Vaping Use   • Vaping status: Never Used   Substance and Sexual Activity   • Alcohol use: Yes     Alcohol/week: 1.0 standard drink of alcohol     Types: 1 Cans of beer per week     Comment: rare   • Drug use: No   • Sexual activity: Yes     Partners: Male   Other Topics Concern   • Not on file   Social History Narrative    Denied history of daily coffee  "consumption      Social Determinants of Health     Financial Resource Strain: Low Risk  (3/29/2023)    Received from [facility]    Overall Financial Resource Strain (CARDIA)    • Difficulty of Paying Living Expenses: Not very hard   Food Insecurity: No Food Insecurity (3/29/2023)    Received from [facility]    Hunger Vital Sign    • Worried About Running Out of Food in the Last Year: Never true    • Ran Out of Food in the Last Year: Never true   Transportation Needs: No Transportation Needs (3/29/2023)    Received from [facility]    PRAPARE - Transportation    • Lack of Transportation (Medical): No    • Lack of Transportation (Non-Medical): No   Physical Activity: Not on file   Stress: Not on file   Social Connections: Not on file   Intimate Partner Violence: Not At Risk (3/29/2023)    Received from [facility]    Humiliation, Afraid, Rape, and Kick questionnaire    • Fear of Current or Ex-Partner: No    • Emotionally Abused: No    • Physically Abused: No    • Sexually Abused: No   Housing Stability: Low Risk  (3/29/2023)    Received from [facility]    Housing Stability Vital Sign    • Unable to Pay for Housing in the Last Year: No    • Number of Places Lived in the Last Year: 1    • Unstable Housing in the Last Year: No        FAMILY HISTORY:  Family History   Problem Relation Age of Onset   • No Known Problems Mother    • Breast cancer Maternal Grandmother    • Breast cancer Maternal Aunt    • Hypothyroidism Maternal Uncle           Objective    PHYSICAL EXAMINATION:   Blood pressure 118/64, pulse 58, temperature 98.1 °F (36.7 °C), temperature source Temporal, resp. rate 16, height 5' 7\", weight 68 kg (150 lb), SpO2 98%, unknown if currently breastfeeding.     Pain Score: 0-No pain     ECOG Performance Status      Flowsheet Row Most Recent Value   ECOG Performance Status 0 - Fully active, able to carry on all pre-disease " performance without restriction               Physical Exam  Constitutional:       General: She is not in acute distress.     Appearance: Normal appearance. She is not ill-appearing or toxic-appearing.   HENT:      Head: Normocephalic and atraumatic.      Right Ear: External ear normal.      Left Ear: External ear normal.      Nose: Nose normal.      Mouth/Throat:      Mouth: Mucous membranes are moist.      Pharynx: Oropharynx is clear.   Eyes:      General: No scleral icterus.        Right eye: No discharge.         Left eye: No discharge.      Conjunctiva/sclera: Conjunctivae normal.   Cardiovascular:      Rate and Rhythm: Normal rate and regular rhythm.      Pulses: Normal pulses.      Heart sounds: No murmur heard.     No friction rub. No gallop.   Pulmonary:      Effort: Pulmonary effort is normal. No respiratory distress.      Breath sounds: Normal breath sounds. No wheezing or rales.   Abdominal:      General: Bowel sounds are normal. There is no distension.      Palpations: There is no mass.      Tenderness: There is no abdominal tenderness. There is no rebound.   Musculoskeletal:         General: No swelling or tenderness.      Cervical back: Normal range of motion. No rigidity.      Right lower leg: No edema.      Left lower leg: No edema.   Lymphadenopathy:      Head:      Right side of head: No submandibular, preauricular or posterior auricular adenopathy.      Left side of head: No submandibular, preauricular or posterior auricular adenopathy.      Cervical: No cervical adenopathy.      Right cervical: No superficial or posterior cervical adenopathy.     Left cervical: No superficial or posterior cervical adenopathy.      Upper Body:      Right upper body: No supraclavicular or axillary adenopathy.      Left upper body: No supraclavicular or axillary adenopathy.      Lower Body: No right inguinal adenopathy. No left inguinal adenopathy.   Skin:     General: Skin is warm.      Coloration: Skin is not  jaundiced.      Findings: No lesion or rash.      Comments: Well healed surgical scar.  No evidence of recurrence at primary site.   Neurological:      General: No focal deficit present.      Mental Status: She is alert and oriented to person, place, and time.      Cranial Nerves: No cranial nerve deficit.      Motor: No weakness.      Gait: Gait normal.   Psychiatric:         Mood and Affect: Mood normal.         Behavior: Behavior normal.         Thought Content: Thought content normal.         Judgment: Judgment normal.         I have personally reviewed results with the patient.    WBC   Date Value Ref Range Status   01/26/2024 6.81 4.31 - 10.16 Thousand/uL Final   09/29/2023 5.33 4.31 - 10.16 Thousand/uL Final   06/13/2023 8.66 4.31 - 10.16 Thousand/uL Final     Hemoglobin   Date Value Ref Range Status   01/26/2024 13.9 11.5 - 15.4 g/dL Final   09/29/2023 14.4 11.5 - 15.4 g/dL Final   06/13/2023 14.2 11.5 - 15.4 g/dL Final     Platelets   Date Value Ref Range Status   01/26/2024 360 149 - 390 Thousands/uL Final   09/29/2023 285 149 - 390 Thousands/uL Final   06/13/2023 311 149 - 390 Thousands/uL Final     MCV   Date Value Ref Range Status   01/26/2024 94 82 - 98 fL Final   09/29/2023 93 82 - 98 fL Final   06/13/2023 87 82 - 98 fL Final      Potassium   Date Value Ref Range Status   01/26/2024 4.2 3.5 - 5.3 mmol/L Final   09/29/2023 4.4 3.5 - 5.3 mmol/L Final   06/13/2023 4.5 3.5 - 5.3 mmol/L Final   08/04/2021 4.7 3.5 - 5.2 mmol/L Final     Chloride   Date Value Ref Range Status   01/26/2024 106 96 - 108 mmol/L Final   09/29/2023 107 96 - 108 mmol/L Final   06/13/2023 111 (H) 96 - 108 mmol/L Final   08/04/2021 111 (H) 100 - 109 mmol/L Final     Carbon Dioxide   Date Value Ref Range Status   08/04/2021 26 23 - 31 mmol/L Final     CO2   Date Value Ref Range Status   01/26/2024 29 21 - 32 mmol/L Final   09/29/2023 29 21 - 32 mmol/L Final   06/13/2023 28 21 - 32 mmol/L Final     BUN   Date Value Ref Range Status    01/26/2024 15 5 - 25 mg/dL Final   09/29/2023 13 5 - 25 mg/dL Final   06/13/2023 13 5 - 25 mg/dL Final   08/04/2021 14 7 - 25 mg/dL Final     Creatinine   Date Value Ref Range Status   01/26/2024 0.84 0.60 - 1.30 mg/dL Final     Comment:     Standardized to IDMS reference method   09/29/2023 0.98 0.60 - 1.30 mg/dL Final     Comment:     Standardized to IDMS reference method   06/13/2023 0.88 0.60 - 1.30 mg/dL Final     Comment:     Standardized to IDMS reference method   08/04/2021 0.80 0.40 - 1.10 mg/dL Final     Glucose   Date Value Ref Range Status   07/18/2022 82 65 - 140 mg/dL Final     Comment:     If the patient is fasting, the ADA then defines impaired fasting glucose as > 100 mg/dL and diabetes as > or equal to 123 mg/dL.  Specimen collection should occur prior to Sulfasalazine administration due to the potential for falsely depressed results. Specimen collection should occur prior to Sulfapyridine administration due to the potential for falsely elevated results.   06/14/2022 91 65 - 140 mg/dL Final     Comment:     If the patient is fasting, the ADA then defines impaired fasting glucose as > 100 mg/dL and diabetes as > or equal to 123 mg/dL.  Specimen collection should occur prior to Sulfasalazine administration due to the potential for falsely depressed results. Specimen collection should occur prior to Sulfapyridine administration due to the potential for falsely elevated results.   02/02/2022 122 65 - 140 mg/dL Final     Comment:     If the patient is fasting, the ADA then defines impaired fasting glucose as > 100 mg/dL and diabetes as > or equal to 123 mg/dL.  Specimen collection should occur prior to Sulfasalazine administration due to the potential for falsely depressed results. Specimen collection should occur prior to Sulfapyridine administration due to the potential for falsely elevated results.   08/04/2021 78 65 - 99 mg/dL Final     eGFR, Non-   Date Value Ref Range Status    08/04/2021 101 >60 Final     eGFR,    Date Value Ref Range Status   08/04/2021 117 >60 Final     Calcium   Date Value Ref Range Status   01/26/2024 9.1 8.4 - 10.2 mg/dL Final   09/29/2023 9.5 8.4 - 10.2 mg/dL Final   06/13/2023 9.2 8.3 - 10.1 mg/dL Final   08/04/2021 9.1 8.5 - 10.1 mg/dL Final     Albumin   Date Value Ref Range Status   01/26/2024 4.4 3.5 - 5.0 g/dL Final   09/29/2023 4.4 3.5 - 5.0 g/dL Final   06/13/2023 3.7 3.5 - 5.0 g/dL Final     ALBUMIN   Date Value Ref Range Status   08/04/2021 4.1 3.5 - 4.8 g/dL Final     Total Bilirubin   Date Value Ref Range Status   01/26/2024 1.01 (H) 0.20 - 1.00 mg/dL Final     Comment:     Use of this assay is not recommended for patients undergoing treatment with eltrombopag due to the potential for falsely elevated results.  N-acetyl-p-benzoquinone imine (metabolite of Acetaminophen) will generate erroneously low results in samples for patients that have taken an overdose of Acetaminophen.   09/29/2023 1.33 (H) 0.20 - 1.00 mg/dL Final     Comment:     Use of this assay is not recommended for patients undergoing treatment with eltrombopag due to the potential for falsely elevated results.  N-acetyl-p-benzoquinone imine (metabolite of Acetaminophen) will generate erroneously low results in samples for patients that have taken an overdose of Acetaminophen.   06/13/2023 0.85 0.20 - 1.00 mg/dL Final     Comment:     Use of this assay is not recommended for patients undergoing treatment with eltrombopag due to the potential for falsely elevated results.   08/04/2021 0.8 0.2 - 1.0 mg/dL Final     Comment:     Use of this assay is not recommended for patients undergoing treatment with eltrombopag due to the potential for falsely elevated results.     Alkaline Phosphatase   Date Value Ref Range Status   01/26/2024 76 34 - 104 U/L Final   09/29/2023 85 34 - 104 U/L Final   06/13/2023 106 46 - 116 U/L Final   08/04/2021 103 35 - 120 U/L Final     AST   Date  "Value Ref Range Status   01/26/2024 14 13 - 39 U/L Final   09/29/2023 20 13 - 39 U/L Final   06/13/2023 23 5 - 45 U/L Final     Comment:     Specimen collection should occur prior to Sulfasalazine administration due to the potential for falsely depressed results.    08/04/2021 18 <41 U/L Final     ALT   Date Value Ref Range Status   01/26/2024 9 7 - 52 U/L Final     Comment:     Specimen collection should occur prior to Sulfasalazine administration due to the potential for falsely depressed results.    09/29/2023 12 7 - 52 U/L Final     Comment:     Specimen collection should occur prior to Sulfasalazine administration due to the potential for falsely depressed results.    06/13/2023 32 12 - 78 U/L Final     Comment:     Specimen collection should occur prior to Sulfasalazine and/or Sulfapyridine administration due to the potential for falsely depressed results.    08/04/2021 20 <56 U/L Final      LD   Date Value Ref Range Status   01/26/2024 179 140 - 271 U/L Final   09/29/2023 168 140 - 271 U/L Final   06/13/2023 176 81 - 234 U/L Final     TSH   Date Value Ref Range Status   09/26/2022 1.37 0.36 - 3.74 uIU/mL Final     No results found for: \"[id number]\"   Free T4   Date Value Ref Range Status   01/26/2024 0.83 0.61 - 1.12 ng/dL Final     Comment:     Specimens with biotin concentrations > 10 ng/mL can lead to significant (> 10%) positive bias in result.   09/29/2023 0.93 0.61 - 1.12 ng/dL Final     Comment:     Specimens with biotin concentrations > 10 ng/mL can lead to significant (> 10%) positive bias in result.   07/31/2023 0.84 0.61 - 1.12 ng/dL Final     Comment:     Specimens with biotin concentrations > 10 ng/mL can lead to significant (> 10%) positive bias in result.         RECENT IMAGING:  No results found.       Assessment/Plan  Ms. Rivera is a 33-year-old female with stage IIB melanoma, here for continued monitoring, follow-up and surveillance.    1. Encounter for follow-up surveillance of melanoma  -     " CBC and differential; Future; Expected date: 07/24/2024  -     Comprehensive metabolic panel; Future; Expected date: 07/24/2024  -     LD,Blood; Future; Expected date: 07/24/2024  -     T3, free; Future; Expected date: 07/24/2024  -     T4, free; Future; Expected date: 07/24/2024  -     TSH, 3rd generation; Future; Expected date: 07/24/2024  -     CT chest abdomen pelvis w contrast; Future; Expected date: 07/24/2024  -     CBC and differential  -     Comprehensive metabolic panel  -     LD,Blood  -     T3, free  -     T4, free  -     TSH, 3rd generation    2. Malignant melanoma of shoulder, right (HCC)  Assessment & Plan:  She is doing well with no clinical evidence of melanoma recurrence or metastasis. She was unable to get blood work prior to today, so we will have her get blood work following this appointment. Orders placed and scripts provided for blood work now and her return in 3 months. She knows to continue to monitor for any new, changing, concerning skin lesions or lymphadenopathy. She knows to continue to monitor for any delayed onset of immune-mediated side effects. She knows to call with issues or concerns prior to her next visit. She will return in 3 months.    Orders:  -     Ambulatory Referral to Hematology / Oncology  -     CBC and differential; Future; Expected date: 07/24/2024  -     Comprehensive metabolic panel; Future; Expected date: 07/24/2024  -     LD,Blood; Future; Expected date: 07/24/2024  -     T3, free; Future; Expected date: 07/24/2024  -     T4, free; Future; Expected date: 07/24/2024  -     TSH, 3rd generation; Future; Expected date: 07/24/2024  -     CT chest abdomen pelvis w contrast; Future; Expected date: 07/24/2024  -     CBC and differential  -     Comprehensive metabolic panel  -     LD,Blood  -     T3, free  -     T4, free  -     TSH, 3rd generation    3. High risk medications (not anticoagulants) long-term use  Assessment & Plan:  The patient was on treatment with  immunotherapy and we will continue to monitor for side effects and lab abnormalities. We will monitor labs with each visit as late onset immune mediated side effects can occur.  The patient knows to watch for signs and symptoms for immune-mediated side effects.  Denies any immune-mediated side effects at this time.    Orders:  -     CBC and differential; Future; Expected date: 07/24/2024  -     Comprehensive metabolic panel; Future; Expected date: 07/24/2024  -     LD,Blood; Future; Expected date: 07/24/2024  -     T3, free; Future; Expected date: 07/24/2024  -     T4, free; Future; Expected date: 07/24/2024  -     TSH, 3rd generation; Future; Expected date: 07/24/2024  -     CT chest abdomen pelvis w contrast; Future; Expected date: 07/24/2024  -     CBC and differential  -     Comprehensive metabolic panel  -     LD,Blood  -     T3, free  -     T4, free  -     TSH, 3rd generation    4. Immunotherapy  -     CBC and differential; Future; Expected date: 07/24/2024  -     Comprehensive metabolic panel; Future; Expected date: 07/24/2024  -     LD,Blood; Future; Expected date: 07/24/2024  -     T3, free; Future; Expected date: 07/24/2024  -     T4, free; Future; Expected date: 07/24/2024  -     TSH, 3rd generation; Future; Expected date: 07/24/2024  -     CT chest abdomen pelvis w contrast; Future; Expected date: 07/24/2024  -     CBC and differential  -     Comprehensive metabolic panel  -     LD,Blood  -     T3, free  -     T4, free  -     TSH, 3rd generation    5. Hypothyroidism, unspecified type  Assessment & Plan:  We will continue to monitor her TSH, T3 and T4 as she did receive immunotherapy from 11/2021 until 06/2022, now almost 2 years ago. However, there can be delayed onset of immune-mediated side effects. 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    Orders:  -     CBC and differential; Future; Expected date:  07/24/2024  -     Comprehensive metabolic panel; Future; Expected date: 07/24/2024  -     LD,Blood; Future; Expected date: 07/24/2024  -     T3, free; Future; Expected date: 07/24/2024  -     T4, free; Future; Expected date: 07/24/2024  -     TSH, 3rd generation; Future; Expected date: 07/24/2024  -     CT chest abdomen pelvis w contrast; Future; Expected date: 07/24/2024  -     CBC and differential  -     Comprehensive metabolic panel  -     LD,Blood  -     T3, free  -     T4, free  -     TSH, 3rd generation       As planned, in three months.    Ary Gaona MD, PhD    Transcribed for Ary Gaona MD, by Willa Flores on 04/24/24 at 6:01 PM. Powered by Dragon [employer].

## 2024-04-24 NOTE — ASSESSMENT & PLAN NOTE
We will continue to monitor her TSH, T3 and T4 as she did receive immunotherapy from 11/2021 until 06/2022, now almost 2 years ago. However, there can be delayed onset of immune-mediated side effects. 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

## 2024-05-29 ENCOUNTER — APPOINTMENT (OUTPATIENT)
Dept: LAB | Facility: MEDICAL CENTER | Age: 34
End: 2024-05-29
Payer: COMMERCIAL

## 2024-05-30 DIAGNOSIS — E03.9 HYPOTHYROIDISM, UNSPECIFIED TYPE: ICD-10-CM

## 2024-05-30 DIAGNOSIS — Z79.899 HIGH RISK MEDICATIONS (NOT ANTICOAGULANTS) LONG-TERM USE: Primary | ICD-10-CM

## 2024-05-30 RX ORDER — LEVOTHYROXINE SODIUM 0.15 MG/1
150 TABLET ORAL DAILY
Qty: 30 TABLET | Refills: 1 | Status: SHIPPED | OUTPATIENT
Start: 2024-05-30

## 2024-05-30 RX ORDER — LEVOTHYROXINE SODIUM 137 UG/1
137 TABLET ORAL DAILY
Qty: 30 TABLET | Refills: 1 | Status: CANCELLED | OUTPATIENT
Start: 2024-05-30

## 2024-06-22 DIAGNOSIS — E03.9 HYPOTHYROIDISM, UNSPECIFIED TYPE: ICD-10-CM

## 2024-06-22 DIAGNOSIS — Z79.899 HIGH RISK MEDICATIONS (NOT ANTICOAGULANTS) LONG-TERM USE: ICD-10-CM

## 2024-06-24 RX ORDER — LEVOTHYROXINE SODIUM 0.15 MG/1
150 TABLET ORAL DAILY
Qty: 90 TABLET | Refills: 1 | Status: SHIPPED | OUTPATIENT
Start: 2024-06-24

## 2024-06-26 ENCOUNTER — OFFICE VISIT (OUTPATIENT)
Dept: FAMILY MEDICINE CLINIC | Facility: CLINIC | Age: 34
End: 2024-06-26
Payer: COMMERCIAL

## 2024-06-26 VITALS
OXYGEN SATURATION: 98 % | TEMPERATURE: 97.3 F | WEIGHT: 145.4 LBS | BODY MASS INDEX: 22.82 KG/M2 | HEIGHT: 67 IN | SYSTOLIC BLOOD PRESSURE: 104 MMHG | HEART RATE: 71 BPM | DIASTOLIC BLOOD PRESSURE: 60 MMHG

## 2024-06-26 DIAGNOSIS — C43.61 MALIGNANT MELANOMA OF SHOULDER, RIGHT (HCC): ICD-10-CM

## 2024-06-26 DIAGNOSIS — F98.8 ADD (ATTENTION DEFICIT DISORDER) WITHOUT HYPERACTIVITY: Primary | ICD-10-CM

## 2024-06-26 PROCEDURE — 99214 OFFICE O/P EST MOD 30 MIN: CPT | Performed by: FAMILY MEDICINE

## 2024-06-26 RX ORDER — LISDEXAMFETAMINE DIMESYLATE CAPSULES 20 MG/1
20 CAPSULE ORAL EVERY MORNING
Qty: 30 CAPSULE | Refills: 0 | Status: SHIPPED | OUTPATIENT
Start: 2024-06-26

## 2024-06-26 NOTE — PROGRESS NOTES
"Assessment/Plan:     1. ADD (attention deficit disorder) without hyperactivity  Assessment & Plan:  Patient would like to restart Vyvanse.  She tolerated this well.  Will start 20 mg daily.  Orders:  -     lisdexamfetamine (VYVANSE) 20 MG capsule; Take 1 capsule (20 mg total) by mouth every morning Max Daily Amount: 20 mg  2. Malignant melanoma of shoulder, right (HCC)  Assessment & Plan:  Patient with no evidence of recurrence. Folow up with dermatolgy as scheduled        Subjective:      Patient ID: Tamiko Rivera is a 33 y.o. female.    Patient is seen today for recheck on ADD.  She previously had been on Vyvanse and weaned off during pregnancy 4 years ago.  Patient would like to restart the medication as she is having a difficult time concentrating.  Patient with also history of early-stage melanoma several years ago and continues to follow with dermatology.  No evidence of recurrence.             The following portions of the patient's history were reviewed and updated as appropriate: allergies, current medications, past family history, past medical history, past social history, past surgical history, and problem list.    Review of Systems   Constitutional: Negative.    HENT: Negative.     Eyes: Negative.    Respiratory: Negative.     Cardiovascular: Negative.    Gastrointestinal: Negative.    Endocrine: Negative.    Genitourinary: Negative.    Musculoskeletal: Negative.    Skin: Negative.    Allergic/Immunologic: Negative.    Neurological: Negative.    Hematological: Negative.    Psychiatric/Behavioral: Negative.           Objective:      /60 (BP Location: Right arm, Patient Position: Sitting)   Pulse 71   Temp (!) 97.3 °F (36.3 °C) (Tympanic)   Ht 5' 7\" (1.702 m)   Wt 66 kg (145 lb 6.4 oz)   SpO2 98%   BMI 22.77 kg/m²          Physical Exam  Vitals reviewed.   Constitutional:       Appearance: She is well-developed.   HENT:      Head: Normocephalic and atraumatic.      Right Ear: External ear " normal. Tympanic membrane is not erythematous or bulging.      Left Ear: External ear normal. Tympanic membrane is not erythematous or bulging.      Nose: Nose normal.      Mouth/Throat:      Mouth: No oral lesions.      Pharynx: No oropharyngeal exudate.   Eyes:      General: No scleral icterus.        Right eye: No discharge.         Left eye: No discharge.      Conjunctiva/sclera: Conjunctivae normal.   Neck:      Thyroid: No thyromegaly.   Cardiovascular:      Rate and Rhythm: Normal rate and regular rhythm.      Heart sounds: Normal heart sounds. No murmur heard.     No friction rub. No gallop.   Pulmonary:      Effort: Pulmonary effort is normal. No respiratory distress.      Breath sounds: No wheezing or rales.   Chest:      Chest wall: No tenderness.   Abdominal:      General: Bowel sounds are normal. There is no distension.      Palpations: Abdomen is soft. There is no mass.      Tenderness: There is no abdominal tenderness. There is no guarding or rebound.   Musculoskeletal:         General: No tenderness or deformity. Normal range of motion.      Cervical back: Normal range of motion and neck supple.   Lymphadenopathy:      Cervical: No cervical adenopathy.   Skin:     General: Skin is warm and dry.      Coloration: Skin is not pale.      Findings: No erythema or rash.   Neurological:      Mental Status: She is alert and oriented to person, place, and time.      Cranial Nerves: No cranial nerve deficit.      Motor: No abnormal muscle tone.      Coordination: Coordination normal.      Deep Tendon Reflexes: Reflexes are normal and symmetric.   Psychiatric:         Behavior: Behavior normal.

## 2024-07-08 ENCOUNTER — TELEPHONE (OUTPATIENT)
Age: 34
End: 2024-07-08

## 2024-07-08 NOTE — TELEPHONE ENCOUNTER
Patient is requesting an insurance referral for the following specialty:      Test Name / Order Name: Skin Check    DX Code: c43.61/Z89.899    Date Of Service: 7/22/24    Location/Facility Name/Address/Phone #:  [id number]  [place] [address] [phone]    Location / Facility NPI: [id number]    Best Phone # To Reach The Patient:  [phone]

## 2024-07-22 ENCOUNTER — TELEPHONE (OUTPATIENT)
Age: 34
End: 2024-07-22

## 2024-07-22 ENCOUNTER — HOSPITAL ENCOUNTER (OUTPATIENT)
Dept: CT IMAGING | Facility: HOSPITAL | Age: 34
Discharge: HOME | End: 2024-07-22
Attending: INTERNAL MEDICINE
Payer: COMMERCIAL

## 2024-07-22 DIAGNOSIS — C43.61 MALIGNANT MELANOMA OF SHOULDER, RIGHT (HCC): ICD-10-CM

## 2024-07-22 DIAGNOSIS — E03.9 HYPOTHYROIDISM, UNSPECIFIED TYPE: ICD-10-CM

## 2024-07-22 DIAGNOSIS — Z08 ENCOUNTER FOR FOLLOW-UP SURVEILLANCE OF MELANOMA: ICD-10-CM

## 2024-07-22 DIAGNOSIS — Z29.89 IMMUNOTHERAPY: ICD-10-CM

## 2024-07-22 DIAGNOSIS — Z85.820 ENCOUNTER FOR FOLLOW-UP SURVEILLANCE OF MELANOMA: ICD-10-CM

## 2024-07-22 DIAGNOSIS — Z79.899 HIGH RISK MEDICATIONS (NOT ANTICOAGULANTS) LONG-TERM USE: ICD-10-CM

## 2024-07-22 PROCEDURE — 71260 CT THORAX DX C+: CPT

## 2024-07-22 PROCEDURE — 74177 CT ABD & PELVIS W/CONTRAST: CPT

## 2024-07-22 RX ADMIN — IOHEXOL 100 ML: 350 INJECTION, SOLUTION INTRAVENOUS at 10:35

## 2024-07-22 NOTE — TELEPHONE ENCOUNTER
Patient is requesting an insurance referral for the following specialty:      Test Name / Order Name: Follow up    DX Code: Z08  Z85.820  C43.61    Date Of Service: 7/29/24    Location/Facility Name/Address/Phone #:  [address] [facility]    Location / Facility NPI: [id number]    Best Phone # To Reach The Patient:  [phone]       Fax to

## 2024-07-25 ENCOUNTER — TELEPHONE (OUTPATIENT)
Age: 34
End: 2024-07-25

## 2024-07-25 NOTE — TELEPHONE ENCOUNTER
Pt called to see if she can change her Vyanase to a different brand.    She is requesting a non extended release adderall.    Pt was requesting a call back from Dr Vargas if possible or someone call if this is possible.  Pt was last seen 6/6.    [phone]

## 2024-07-26 NOTE — TELEPHONE ENCOUNTER
I will need more information on why she wishes to change medications.  She may wish to schedule appointment to discuss.

## 2024-07-29 ENCOUNTER — OFFICE VISIT (OUTPATIENT)
Dept: HEMATOLOGY ONCOLOGY | Facility: CLINIC | Age: 34
End: 2024-07-29
Payer: COMMERCIAL

## 2024-07-29 ENCOUNTER — TELEMEDICINE (OUTPATIENT)
Dept: FAMILY MEDICINE CLINIC | Facility: CLINIC | Age: 34
End: 2024-07-29
Payer: COMMERCIAL

## 2024-07-29 VITALS
BODY MASS INDEX: 22.44 KG/M2 | TEMPERATURE: 98 F | SYSTOLIC BLOOD PRESSURE: 106 MMHG | RESPIRATION RATE: 17 BRPM | OXYGEN SATURATION: 98 % | HEIGHT: 67 IN | DIASTOLIC BLOOD PRESSURE: 62 MMHG | WEIGHT: 143 LBS | HEART RATE: 66 BPM

## 2024-07-29 DIAGNOSIS — C43.61 MALIGNANT MELANOMA OF SHOULDER, RIGHT (HCC): ICD-10-CM

## 2024-07-29 DIAGNOSIS — Z79.899 HIGH RISK MEDICATIONS (NOT ANTICOAGULANTS) LONG-TERM USE: ICD-10-CM

## 2024-07-29 DIAGNOSIS — Z12.4 SCREENING FOR CERVICAL CANCER: ICD-10-CM

## 2024-07-29 DIAGNOSIS — F98.8 ADD (ATTENTION DEFICIT DISORDER) WITHOUT HYPERACTIVITY: Primary | ICD-10-CM

## 2024-07-29 DIAGNOSIS — Z08 ENCOUNTER FOR FOLLOW-UP SURVEILLANCE OF MELANOMA: Primary | ICD-10-CM

## 2024-07-29 DIAGNOSIS — Z85.820 ENCOUNTER FOR FOLLOW-UP SURVEILLANCE OF MELANOMA: Primary | ICD-10-CM

## 2024-07-29 PROCEDURE — 99214 OFFICE O/P EST MOD 30 MIN: CPT | Performed by: INTERNAL MEDICINE

## 2024-07-29 PROCEDURE — 99213 OFFICE O/P EST LOW 20 MIN: CPT | Performed by: FAMILY MEDICINE

## 2024-07-29 RX ORDER — DEXTROAMPHETAMINE SACCHARATE, AMPHETAMINE ASPARTATE, DEXTROAMPHETAMINE SULFATE AND AMPHETAMINE SULFATE 5; 5; 5; 5 MG/1; MG/1; MG/1; MG/1
20 TABLET ORAL DAILY
Qty: 30 TABLET | Refills: 0 | Status: SHIPPED | OUTPATIENT
Start: 2024-07-29

## 2024-07-29 NOTE — PROGRESS NOTES
Virtual Regular Visit  Name: Tamiko Rivera      : 1990      MRN: [id number]  Encounter Provider: Terrence Vargas DO  Encounter Date: 2024   Encounter department: [facility]    Verification of patient location:    Patient is located at Home in the following state in which I hold an active license [place]    Assessment & Plan   1. ADD (attention deficit disorder) without hyperactivity  -     amphetamine-dextroamphetamine (ADDERALL, 20MG,) 20 mg tablet; Take 1 tablet (20 mg total) by mouth daily Max Daily Amount: 20 mg  2. Screening for cervical cancer  -     Ambulatory referral to Obstetrics / Gynecology; Future     Patient will call with any new persisting or worsening symptoms.    Encounter provider Terrence Vargas DO    The patient was identified by name and date of birth. Tamiko Rivera was informed that this is a telemedicine visit and that the visit is being conducted through the [employer] platform. She agrees to proceed..  My office door was closed. No one else was in the room.  She acknowledged consent and understanding of privacy and security of the video platform. The patient has agreed to participate and understands they can discontinue the visit at any time.    Patient is aware this is a billable service.     History of Present Illness     Patient seen today for concern about sleep disturbance.  She is having difficult time sleeping with the Vyvanse.  She would like to consider switching to Adderall.  She believes she tolerated Adderall better in the past.        Review of Systems   Constitutional: Negative.    HENT: Negative.     Eyes: Negative.    Respiratory: Negative.     Cardiovascular: Negative.    Gastrointestinal: Negative.    Endocrine: Negative.    Genitourinary: Negative.    Musculoskeletal: Negative.    Skin: Negative.    Allergic/Immunologic: Negative.    Neurological: Negative.    Hematological: Negative.    Psychiatric/Behavioral:  Positive for sleep  disturbance.        Objective     There were no vitals taken for this visit.  Physical Exam  Constitutional:       General: She is not in acute distress.     Appearance: She is well-developed.   Neurological:      Mental Status: She is alert and oriented to person, place, and time.   Psychiatric:         Behavior: Behavior normal.         Thought Content: Thought content normal.         Judgment: Judgment normal.         Visit Time  Total Visit Duration: 15 minutes

## 2024-07-29 NOTE — LETTER
August 4, 2024     Nicole Fonseca MD  [address] [facility] [address]  [facility] [address]    Patient: Tamiko Rivera   YOB: 1990   Date of Visit: 7/29/2024       Dear Dr. Fonseca:    Thank you for referring Tamiko Rivera to me for evaluation. Below are my notes for this consultation.    If you have questions, please do not hesitate to call me. I look forward to following your patient along with you.         Sincerely,        Ary Gaona MD        CC: DO Lyubov Jean Baptiste MD Erika L Lutkins, CRNP  [facility]  [facility] MD Ary Isabel MD  8/4/2024  8:30 PM  Sign when Signing Visit  [facility] [place]  [address] [facility] [address]  [phone]  [phone]     Date of Visit: 7/29/2024  Name: Tamiko Rivera   YOB: 1990        Subjective    VISIT DIAGNOSIS:  Diagnoses and all orders for this visit:    Encounter for follow-up surveillance of melanoma  -     CBC and differential; Standing  -     Comprehensive metabolic panel; Standing  -     LD,Blood; Standing  -     T3, free; Standing  -     T4, free; Standing  -     TSH, 3rd generation; Standing  -     CT neck chest abdomen pelvis w contrast; Future    Malignant melanoma of shoulder, right (HCC)  -     CBC and differential; Standing  -     Comprehensive metabolic panel; Standing  -     LD,Blood; Standing  -     T3, free; Standing  -     T4, free; Standing  -     TSH, 3rd generation; Standing  -     CT neck chest abdomen pelvis w contrast; Future    High risk medications (not anticoagulants) long-term use  -     CBC and differential; Standing  -     Comprehensive metabolic panel; Standing  -     LD,Blood; Standing  -     T3, free; Standing  -     T4, free; Standing  -     TSH, 3rd generation; Standing  -     CT neck chest abdomen pelvis w contrast; Future        Oncology History   Malignant melanoma of shoulder, right (HCC)   7/8/2021 Biopsy    Right anterior  shoulder:  - Ulcerated malignant melanoma, 2.5mm    Ulceration: Yes  Mitosis: >1    NGS Testing  MS-Stable  TMB - 13Muts/Mb  CDKN2A E10  TERT promoter -124C>T       7/8/2021 Genomic Testing    DecisionDx - Class 2B     7/8/2021 -  Cancer Staged    Staging form: Melanoma of the Skin, AJCC 8th Edition  - Pathologic stage from 7/8/2021: Stage IIB (pT3b, pN0, cM0) - Signed by Ary Gaona MD on 9/20/2021  Stage prefix: Initial diagnosis       8/18/2021 Surgery    Wide excision melanoma with sentinel lymph node biopsy  No residual melanoma  LNs negative - 0/9 LNs     11/17/2021 - 6/16/2022 Chemotherapy    pembrolizumab (KEYTRUDA) IVPB, 400 mg, Intravenous, Once, 6 of 9 cycles  Administration: 400 mg (11/17/2021), 400 mg (12/29/2021), 400 mg (2/7/2022), 400 mg (3/21/2022), 400 mg (5/2/2022), 400 mg (6/16/2022)        Cancer Staging   Malignant melanoma of shoulder, right (HCC)  Staging form: Melanoma of the Skin, AJCC 8th Edition  - Pathologic stage from 7/8/2021: Stage IIB (pT3b, pN0, cM0) - Signed by Ary Gaona MD on 9/20/2021     Treatment Details   Treatment goal Curative   Plan Name OP Pembrolizumab Q 42 Days   Status Inactive   Start Date 11/17/2021   End Date 6/16/2022   Provider Ary Gaona MD   Chemotherapy pembrolizumab (KEYTRUDA) IVPB, 400 mg, Intravenous, Once, 6 of 9 cycles  Administration: 400 mg (11/17/2021), 400 mg (12/29/2021), 400 mg (2/7/2022), 400 mg (3/21/2022), 400 mg (5/2/2022), 400 mg (6/16/2022)          HISTORY OF PRESENT ILLNESS: Tamiko Rivera is a 33 y.o. female  who has stage IIb melanoma status post adjuvant treatment pembrolizumab with her last dose being on 6/16/2022 here for continued monitoring, follow-up and surveillance.    She is doing well.  Feels good.  Energy is good.  Eating well.  Continues to follow with dermatology.  Denies any new, changing, concerning skin lesions.  Denies lymphadenopathy.    She had imaging on 7/22/2024 with a CT chest, abdomen pelvis that  demonstrates no evidence of melanoma recurrence or metastasis.        REVIEW OF SYSTEMS:  Review of Systems   Constitutional:  Negative for appetite change, fatigue, fever and unexpected weight change.   HENT:   Negative for lump/mass, trouble swallowing and voice change.    Eyes:  Negative for icterus.   Respiratory:  Negative for cough, shortness of breath and wheezing.    Cardiovascular:  Negative for leg swelling.   Gastrointestinal:  Negative for abdominal pain, constipation, diarrhea, nausea and vomiting.   Genitourinary:  Negative for difficulty urinating and hematuria.    Musculoskeletal:  Negative for arthralgias, gait problem and myalgias.   Skin:  Negative for itching and rash.        No new, changing, or concerning lesions.   Neurological:  Negative for extremity weakness, gait problem, headaches, light-headedness and numbness.   Hematological:  Negative for adenopathy.        MEDICATIONS:    Current Outpatient Medications:   •  betamethasone dipropionate (DIPROSONE) 0.05 % cream, Apply topically 2 (two) times a day, Disp: 30 g, Rfl: 0  •  clindamycin (CLEOCIN T) 1 % lotion, Apply to face, Disp: , Rfl:   •  levothyroxine 150 mcg tablet, TAKE 1 TABLET BY MOUTH EVERY DAY, Disp: 90 tablet, Rfl: 1  •  amphetamine-dextroamphetamine (ADDERALL, 20MG,) 20 mg tablet, Take 1 tablet (20 mg total) by mouth daily Max Daily Amount: 20 mg, Disp: 30 tablet, Rfl: 0  •  docusate sodium (COLACE) 250 MG capsule, Take 250 mg by mouth daily Patient reports she is taking off brand colace (not sure of the exact name) PRN (Patient not taking: Reported on 7/25/2023), Disp: , Rfl:   •  levothyroxine (Euthyrox) 100 mcg tablet, Take 1 tablet (100 mcg total) by mouth daily (Patient not taking: Reported on 4/24/2024), Disp: 30 tablet, Rfl: 3  •  levothyroxine (Euthyrox) 125 mcg tablet, Take 1 tablet (125 mcg total) by mouth daily (Patient not taking: Reported on 6/26/2024), Disp: 30 tablet, Rfl: 3  •  levothyroxine 88 mcg tablet, TAKE  1 TABLET BY MOUTH EVERY DAY (Patient not taking: Reported on 4/24/2024), Disp: 90 tablet, Rfl: 1  •  Prenatal Vit-Fe Fumarate-FA (PRENATAL VITAMIN PO), Take 1 tablet by mouth in the morning (Patient not taking: Reported on 4/24/2024), Disp: , Rfl:      ALLERGIES:  Allergies   Allergen Reactions   • Strawberry Extract - Food Allergy Swelling        ACTIVE PROBLEMS:  Patient Active Problem List   Diagnosis   • ADD (attention deficit disorder) without hyperactivity   • Malignant melanoma of shoulder, right (HCC)   • Family history of breast cancer   • High risk medications (not anticoagulants) long-term use   • Immunotherapy   • Hypothyroidism   • First trimester bleeding   • Cancer complicating pregnancy in second trimester   • Abnormal MSAFP (maternal serum alpha-fetoprotein), elevated   • Thyroid disease affecting pregnancy   • Encounter for follow-up surveillance of melanoma          PAST MEDICAL HISTORY:   Past Medical History:   Diagnosis Date   • Cancer (HCC)     Skin cancer   • Immunotherapy 11/2021   • Lymphoma (HCC)    • Melanoma (HCC) 2021        PAST SURGICAL HISTORY:  Past Surgical History:   Procedure Laterality Date   • LYMPH NODE BIOPSY Right 8/18/2021    Procedure: BIOPSY LYMPH NODE SENTINEL, INTRAOPERATIVE .LYMPHATIC MAPPING, LYMPHOSCINTIGRAPHY (NUC MED INJECT AT 0730);  Surgeon: Nicole Fonseca MD;  Location: [facility];  Service: Surgical Oncology   • SKIN LESION EXCISION Right 8/18/2021    Procedure: EXCISION WIDE LESION UPPER EXTREMITY;  Surgeon: Nicole Fonseca MD;  Location: [facility];  Service: Surgical Oncology   • SPLIT THICKNESS SKIN GRAFT Right 8/18/2021    Procedure: RIGHT SHOULDER  COMPLEX CLOSURE;  Surgeon: Felipe Landa MD;  Location: [facility];  Service: Plastics   • WISDOM TOOTH EXTRACTION          SOCIAL HISTORY:  Social History     Socioeconomic History   • Marital status: Single     Spouse name: None   • Number of children: None   • Years of education: None   •  Highest education level: None   Occupational History   • None   Tobacco Use   • Smoking status: Former     Current packs/day: 0.00     Average packs/day: 0.3 packs/day for 1 year (0.3 ttl pk-yrs)     Types: Cigarettes     Start date: 2017     Quit date: 2018     Years since quittin.5   • Smokeless tobacco: Never   • Tobacco comments:     about 2-3 years ago    Vaping Use   • Vaping status: Never Used   Substance and Sexual Activity   • Alcohol use: Yes     Alcohol/week: 1.0 standard drink of alcohol     Types: 1 Cans of beer per week     Comment: rare   • Drug use: No   • Sexual activity: Yes     Partners: Male   Other Topics Concern   • None   Social History Narrative    Denied history of daily coffee consumption      Social Determinants of Health     Financial Resource Strain: Low Risk  (3/29/2023)    [facility]    Overall Financial Resource Strain (CARDIA)    • Difficulty of Paying Living Expenses: Not very hard   Food Insecurity: No Food Insecurity (3/29/2023)    [facility] from [facility], [facility]    Hunger Vital Sign    • Worried About Running Out of Food in the Last Year: Never true    • Ran Out of Food in the Last Year: Never true   Transportation Needs: No Transportation Needs (3/29/2023)    Received from [facility], [facility]    PRAPARE - Transportation    • Lack of Transportation (Medical): No    • Lack of Transportation (Non-Medical): No   Physical Activity: Not on file   Stress: Not on file   Social Connections: Not on file   Intimate Partner Violence: Not At Risk (3/29/2023)    Received from [facility], [facility]    Humiliation, Afraid, Rape, and Kick questionnaire    • Fear of Current or Ex-Partner: No    • Emotionally Abused: No    • Physically Abused: No    • Sexually Abused: No   Housing Stability: Low Risk  (3/29/2023)     "[facility] from [facility], [facility]    Housing Stability Vital Sign    • Unable to Pay for Housing in the Last Year: No    • Number of Places Lived in the Last Year: 1    • Unstable Housing in the Last Year: No        FAMILY HISTORY:  Family History   Problem Relation Age of Onset   • No Known Problems Mother    • Breast cancer Maternal Grandmother    • Breast cancer Maternal Aunt    • Hypothyroidism Maternal Uncle            Objective    PHYSICAL EXAMINATION:   Blood pressure 106/62, pulse 66, temperature 98 °F (36.7 °C), temperature source Temporal, resp. rate 17, height 5' 7\" (1.702 m), weight 64.9 kg (143 lb), SpO2 98%, unknown if currently breastfeeding.     Pain Score: 0-No pain     ECOG Performance Status      Flowsheet Row Most Recent Value   ECOG Performance Status 0 - Fully active, able to carry on all pre-disease performance without restriction               Physical Exam  Constitutional:       General: She is not in acute distress.     Appearance: Normal appearance. She is not ill-appearing or toxic-appearing.   HENT:      Head: Normocephalic and atraumatic.      Right Ear: External ear normal.      Left Ear: External ear normal.      Nose: Nose normal.      Mouth/Throat:      Mouth: Mucous membranes are moist.      Pharynx: Oropharynx is clear.   Eyes:      General: No scleral icterus.        Right eye: No discharge.         Left eye: No discharge.      Conjunctiva/sclera: Conjunctivae normal.   Cardiovascular:      Rate and Rhythm: Normal rate and regular rhythm.      Pulses: Normal pulses.      Heart sounds: No murmur heard.     No friction rub. No gallop.   Pulmonary:      Effort: Pulmonary effort is normal. No respiratory distress.      Breath sounds: Normal breath sounds. No wheezing or rales.   Abdominal:      General: Bowel sounds are normal. There is no distension.      Palpations: There is no mass.      Tenderness: There is no abdominal tenderness. There is " no rebound.   Musculoskeletal:         General: No swelling or tenderness.      Cervical back: Normal range of motion. No rigidity.      Right lower leg: No edema.      Left lower leg: No edema.   Lymphadenopathy:      Head:      Right side of head: No submandibular, preauricular or posterior auricular adenopathy.      Left side of head: No submandibular, preauricular or posterior auricular adenopathy.      Cervical: No cervical adenopathy.      Right cervical: No superficial or posterior cervical adenopathy.     Left cervical: No superficial or posterior cervical adenopathy.      Upper Body:      Right upper body: No supraclavicular or axillary adenopathy.      Left upper body: No supraclavicular or axillary adenopathy.      Lower Body: No right inguinal adenopathy. No left inguinal adenopathy.   Skin:     General: Skin is warm.      Coloration: Skin is not jaundiced.      Findings: No lesion or rash.      Comments: Well healed surgical scar.  No evidence of recurrence at primary site.   Neurological:      General: No focal deficit present.      Mental Status: She is alert and oriented to person, place, and time.      Cranial Nerves: No cranial nerve deficit.      Motor: No weakness.      Gait: Gait normal.   Psychiatric:         Mood and Affect: Mood normal.         Behavior: Behavior normal.         Thought Content: Thought content normal.         Judgment: Judgment normal.         I reviewed lab data in the chart.    WBC   Date Value Ref Range Status   05/29/2024 7.52 4.31 - 10.16 Thousand/uL Final   01/26/2024 6.81 4.31 - 10.16 Thousand/uL Final   09/29/2023 5.33 4.31 - 10.16 Thousand/uL Final     Hemoglobin   Date Value Ref Range Status   05/29/2024 13.3 11.5 - 15.4 g/dL Final   01/26/2024 13.9 11.5 - 15.4 g/dL Final   09/29/2023 14.4 11.5 - 15.4 g/dL Final     Platelets   Date Value Ref Range Status   05/29/2024 334 149 - 390 Thousands/uL Final   01/26/2024 360 149 - 390 Thousands/uL Final   09/29/2023 285  149 - 390 Thousands/uL Final     MCV   Date Value Ref Range Status   05/29/2024 90 82 - 98 fL Final   01/26/2024 94 82 - 98 fL Final   09/29/2023 93 82 - 98 fL Final      Potassium   Date Value Ref Range Status   05/29/2024 4.5 3.5 - 5.3 mmol/L Final   01/26/2024 4.2 3.5 - 5.3 mmol/L Final   09/29/2023 4.4 3.5 - 5.3 mmol/L Final   08/04/2021 4.7 3.5 - 5.2 mmol/L Final     Chloride   Date Value Ref Range Status   05/29/2024 104 96 - 108 mmol/L Final   01/26/2024 106 96 - 108 mmol/L Final   09/29/2023 107 96 - 108 mmol/L Final   08/04/2021 111 (H) 100 - 109 mmol/L Final     Carbon Dioxide   Date Value Ref Range Status   08/04/2021 26 23 - 31 mmol/L Final     CO2   Date Value Ref Range Status   05/29/2024 25 21 - 32 mmol/L Final   01/26/2024 29 21 - 32 mmol/L Final   09/29/2023 29 21 - 32 mmol/L Final     BUN   Date Value Ref Range Status   05/29/2024 20 5 - 25 mg/dL Final   01/26/2024 15 5 - 25 mg/dL Final   09/29/2023 13 5 - 25 mg/dL Final   08/04/2021 14 7 - 25 mg/dL Final     Creatinine   Date Value Ref Range Status   05/29/2024 0.76 0.60 - 1.30 mg/dL Final     Comment:     Standardized to IDMS reference method   01/26/2024 0.84 0.60 - 1.30 mg/dL Final     Comment:     Standardized to IDMS reference method   09/29/2023 0.98 0.60 - 1.30 mg/dL Final     Comment:     Standardized to IDMS reference method   08/04/2021 0.80 0.40 - 1.10 mg/dL Final     Glucose   Date Value Ref Range Status   05/29/2024 82 65 - 140 mg/dL Final     Comment:     If the patient is fasting, the ADA then defines impaired fasting glucose as > 100 mg/dL and diabetes as > or equal to 123 mg/dL.   07/18/2022 82 65 - 140 mg/dL Final     Comment:     If the patient is fasting, the ADA then defines impaired fasting glucose as > 100 mg/dL and diabetes as > or equal to 123 mg/dL.  Specimen collection should occur prior to Sulfasalazine administration due to the potential for falsely depressed results. Specimen collection should occur prior to  Sulfapyridine administration due to the potential for falsely elevated results.   06/14/2022 91 65 - 140 mg/dL Final     Comment:     If the patient is fasting, the ADA then defines impaired fasting glucose as > 100 mg/dL and diabetes as > or equal to 123 mg/dL.  Specimen collection should occur prior to Sulfasalazine administration due to the potential for falsely depressed results. Specimen collection should occur prior to Sulfapyridine administration due to the potential for falsely elevated results.   08/04/2021 78 65 - 99 mg/dL Final     eGFR, Non-   Date Value Ref Range Status   08/04/2021 101 >60 Final     eGFR,    Date Value Ref Range Status   08/04/2021 117 >60 Final     Calcium   Date Value Ref Range Status   05/29/2024 8.9 8.4 - 10.2 mg/dL Final   01/26/2024 9.1 8.4 - 10.2 mg/dL Final   09/29/2023 9.5 8.4 - 10.2 mg/dL Final   08/04/2021 9.1 8.5 - 10.1 mg/dL Final     Albumin   Date Value Ref Range Status   05/29/2024 4.2 3.5 - 5.0 g/dL Final   01/26/2024 4.4 3.5 - 5.0 g/dL Final   09/29/2023 4.4 3.5 - 5.0 g/dL Final     ALBUMIN   Date Value Ref Range Status   08/04/2021 4.1 3.5 - 4.8 g/dL Final     Total Bilirubin   Date Value Ref Range Status   05/29/2024 1.40 (H) 0.20 - 1.00 mg/dL Final     Comment:     Use of this assay is not recommended for patients undergoing treatment with eltrombopag due to the potential for falsely elevated results.  N-acetyl-p-benzoquinone imine (metabolite of Acetaminophen) will generate erroneously low results in samples for patients that have taken an overdose of Acetaminophen.   01/26/2024 1.01 (H) 0.20 - 1.00 mg/dL Final     Comment:     Use of this assay is not recommended for patients undergoing treatment with eltrombopag due to the potential for falsely elevated results.  N-acetyl-p-benzoquinone imine (metabolite of Acetaminophen) will generate erroneously low results in samples for patients that have taken an overdose of Acetaminophen.  "  09/29/2023 1.33 (H) 0.20 - 1.00 mg/dL Final     Comment:     Use of this assay is not recommended for patients undergoing treatment with eltrombopag due to the potential for falsely elevated results.  N-acetyl-p-benzoquinone imine (metabolite of Acetaminophen) will generate erroneously low results in samples for patients that have taken an overdose of Acetaminophen.   08/04/2021 0.8 0.2 - 1.0 mg/dL Final     Comment:     Use of this assay is not recommended for patients undergoing treatment with eltrombopag due to the potential for falsely elevated results.     Alkaline Phosphatase   Date Value Ref Range Status   05/29/2024 55 34 - 104 U/L Final   01/26/2024 76 34 - 104 U/L Final   09/29/2023 85 34 - 104 U/L Final   08/04/2021 103 35 - 120 U/L Final     AST   Date Value Ref Range Status   05/29/2024 15 13 - 39 U/L Final   01/26/2024 14 13 - 39 U/L Final   09/29/2023 20 13 - 39 U/L Final   08/04/2021 18 <41 U/L Final     ALT   Date Value Ref Range Status   05/29/2024 7 7 - 52 U/L Final     Comment:     Specimen collection should occur prior to Sulfasalazine administration due to the potential for falsely depressed results.    01/26/2024 9 7 - 52 U/L Final     Comment:     Specimen collection should occur prior to Sulfasalazine administration due to the potential for falsely depressed results.    09/29/2023 12 7 - 52 U/L Final     Comment:     Specimen collection should occur prior to Sulfasalazine administration due to the potential for falsely depressed results.    08/04/2021 20 <56 U/L Final      LD   Date Value Ref Range Status   05/29/2024 172 140 - 271 U/L Final   01/26/2024 179 140 - 271 U/L Final   09/29/2023 168 140 - 271 U/L Final     TSH   Date Value Ref Range Status   09/26/2022 1.37 0.36 - 3.74 uIU/mL Final     No results found for: \"[id number]\"   Free T4   Date Value Ref Range Status   05/29/2024 0.64 0.61 - 1.12 ng/dL Final     Comment:     Specimens with biotin concentrations > 10 ng/mL can lead to " significant (> 10%) positive bias in result.   01/26/2024 0.83 0.61 - 1.12 ng/dL Final     Comment:     Specimens with biotin concentrations > 10 ng/mL can lead to significant (> 10%) positive bias in result.   09/29/2023 0.93 0.61 - 1.12 ng/dL Final     Comment:     Specimens with biotin concentrations > 10 ng/mL can lead to significant (> 10%) positive bias in result.         RECENT IMAGING:  Procedure: CT chest abdomen pelvis w contrast    Result Date: 7/22/2024  Narrative: CT CHEST, ABDOMEN AND PELVIS WITH IV CONTRAST INDICATION:   Encounter for follow-up examination after completed treatment for malignant neoplasm. Personal history of malignant melanoma of skin. Malignant melanoma of right upper limb, including shoulder. Other long term (current) drug therapy. Encounter for other specified prophylactic measures. Hypothyroidism, unspecified. . COMPARISON: 1/5/2024. TECHNIQUE: CT examination of the chest, abdomen and pelvis was performed. Multiplanar 2D reformatted images were created from the source data. This examination, like all CT scans performed in the [facility], was performed utilizing techniques to minimize radiation dose exposure, including the use of iterative reconstruction and automated exposure control. Radiation dose length product (DLP) for this visit: IV Contrast: Enteric Contrast: Enteric contrast was administered. FINDINGS: CHEST LUNGS:  Lungs are clear.  There is no tracheal or endobronchial lesion. PLEURA:  Unremarkable. HEART/GREAT VESSELS: Heart is unremarkable for patient's age.  No thoracic aortic aneurysm. MEDIASTINUM AND SUNNY:  Unremarkable. CHEST WALL AND LOWER NECK:  Unremarkable. ABDOMEN LIVER/BILIARY TREE:  Unremarkable. GALLBLADDER:  No calcified gallstones. No pericholecystic inflammatory change. SPLEEN:  Unremarkable. PANCREAS:  Unremarkable. ADRENAL GLANDS:  Unremarkable. KIDNEYS/URETERS:  Unremarkable. No hydronephrosis. STOMACH AND BOWEL:  Unremarkable.  APPENDIX:  No findings to suggest appendicitis. ABDOMINOPELVIC CAVITY:  No ascites.  No pneumoperitoneum.  No lymphadenopathy. VESSELS:  Unremarkable for patient's age. PELVIS REPRODUCTIVE ORGANS:  Unremarkable for patient's age. URINARY BLADDER:  Unremarkable. ABDOMINAL WALL/INGUINAL REGIONS:  Unremarkable. OSSEOUS STRUCTURES:  No acute fracture or destructive osseous lesion.     Impression: No recurrent or metastatic disease. Electronically signed: 07/22/2024 05:04 PM Kwame Collins MD           Assessment/Plan  Ms. Rivera is a 33-year-old female with stage IIb melanoma status post adjuvant treatment pembrolizumab with an abbreviated course due to becoming pregnant with her daughter here for continued monitoring, follow-up and surveillance.    1. Encounter for follow-up surveillance of melanoma  2. Malignant melanoma of shoulder, right (HCC)  She is doing well with no clinical evidence of melanoma recurrence and imaging is negative for metastatic disease.  She was unable to get labs prior to today's visit.  She will get them following the visit.  She is otherwise doing well.  She is now 3 years out from diagnosis and we will continue to monitor her every 6 months.  She will return in 6 months.  At that time she will have blood work and repeat imaging.  All orders placed and prescription provided.  She knows to call with issues or concerns prior to her next visit.  She knows to continue to follow with dermatology.    - CBC and differential; Standing  - Comprehensive metabolic panel; Standing  - LD,Blood; Standing  - T3, free; Standing  - T4, free; Standing  - TSH, 3rd generation; Standing  - CT neck chest abdomen pelvis w contrast; Future    3. High risk medications (not anticoagulants) long-term use  She is status post 6 cycles of treatment with pembrolizumab.  She did develop hypothyroidism.  We continue to monitor TSH levels and adjust levothyroxine as needed.    - CBC and differential; Standing  - Comprehensive  metabolic panel; Standing  - LD,Blood; Standing  - T3, free; Standing  - T4, free; Standing  - TSH, 3rd generation; Standing  - CT neck chest abdomen pelvis w contrast; Future        Return in about 6 months (around 1/29/2025) for Office Visit, labs, scans.     Ary Gaona MD, PhD

## 2024-08-05 NOTE — PROGRESS NOTES
[facility]  [address] [address]  [phone]     Date of Visit: 7/29/2024  Name: Tamiko Rivera   YOB: 1990        Subjective    VISIT DIAGNOSIS:  Diagnoses and all orders for this visit:    Encounter for follow-up surveillance of melanoma  -     CBC and differential; Standing  -     Comprehensive metabolic panel; Standing  -     LD,Blood; Standing  -     T3, free; Standing  -     T4, free; Standing  -     TSH, 3rd generation; Standing  -     CT neck chest abdomen pelvis w contrast; Future    Malignant melanoma of shoulder, right (HCC)  -     CBC and differential; Standing  -     Comprehensive metabolic panel; Standing  -     LD,Blood; Standing  -     T3, free; Standing  -     T4, free; Standing  -     TSH, 3rd generation; Standing  -     CT neck chest abdomen pelvis w contrast; Future    High risk medications (not anticoagulants) long-term use  -     CBC and differential; Standing  -     Comprehensive metabolic panel; Standing  -     LD,Blood; Standing  -     T3, free; Standing  -     T4, free; Standing  -     TSH, 3rd generation; Standing  -     CT neck chest abdomen pelvis w contrast; Future        Oncology History   Malignant melanoma of shoulder, right (HCC)   7/8/2021 Biopsy    Right anterior shoulder:  - Ulcerated malignant melanoma, 2.5mm    Ulceration: Yes  Mitosis: >1    NGS Testing  MS-Stable  TMB - 13Muts/Mb  CDKN2A E10  TERT promoter -124C>T       7/8/2021 Genomic Testing    DecisionDx - Class 2B     7/8/2021 -  Cancer Staged    Staging form: Melanoma of the Skin, AJCC 8th Edition  - Pathologic stage from 7/8/2021: Stage IIB (pT3b, pN0, cM0) - Signed by Ary Gaona MD on 9/20/2021  Stage prefix: Initial diagnosis       8/18/2021 Surgery    Wide excision melanoma with sentinel lymph node biopsy  No residual melanoma  LNs negative - 0/9 LNs     11/17/2021 - 6/16/2022 Chemotherapy    pembrolizumab (KEYTRUDA)  IVPB, 400 mg, Intravenous, Once, 6 of 9 cycles  Administration: 400 mg (11/17/2021), 400 mg (12/29/2021), 400 mg (2/7/2022), 400 mg (3/21/2022), 400 mg (5/2/2022), 400 mg (6/16/2022)        Cancer Staging   Malignant melanoma of shoulder, right (HCC)  Staging form: Melanoma of the Skin, AJCC 8th Edition  - Pathologic stage from 7/8/2021: Stage IIB (pT3b, pN0, cM0) - Signed by Ary Gaona MD on 9/20/2021     Treatment Details   Treatment goal Curative   Plan Name OP Pembrolizumab Q 42 Days   Status Inactive   Start Date 11/17/2021   End Date 6/16/2022   Provider Ary Gaona MD   Chemotherapy pembrolizumab (KEYTRUDA) IVPB, 400 mg, Intravenous, Once, 6 of 9 cycles  Administration: 400 mg (11/17/2021), 400 mg (12/29/2021), 400 mg (2/7/2022), 400 mg (3/21/2022), 400 mg (5/2/2022), 400 mg (6/16/2022)          HISTORY OF PRESENT ILLNESS: Tamiko Rivera is a 33 y.o. female  who has stage IIb melanoma status post adjuvant treatment pembrolizumab with her last dose being on 6/16/2022 here for continued monitoring, follow-up and surveillance.    She is doing well.  Feels good.  Energy is good.  Eating well.  Continues to follow with dermatology.  Denies any new, changing, concerning skin lesions.  Denies lymphadenopathy.    She had imaging on 7/22/2024 with a CT chest, abdomen pelvis that demonstrates no evidence of melanoma recurrence or metastasis.        REVIEW OF SYSTEMS:  Review of Systems   Constitutional:  Negative for appetite change, fatigue, fever and unexpected weight change.   HENT:   Negative for lump/mass, trouble swallowing and voice change.    Eyes:  Negative for icterus.   Respiratory:  Negative for cough, shortness of breath and wheezing.    Cardiovascular:  Negative for leg swelling.   Gastrointestinal:  Negative for abdominal pain, constipation, diarrhea, nausea and vomiting.   Genitourinary:  Negative for difficulty urinating and hematuria.    Musculoskeletal:  Negative for arthralgias, gait  problem and myalgias.   Skin:  Negative for itching and rash.        No new, changing, or concerning lesions.   Neurological:  Negative for extremity weakness, gait problem, headaches, light-headedness and numbness.   Hematological:  Negative for adenopathy.        MEDICATIONS:    Current Outpatient Medications:     betamethasone dipropionate (DIPROSONE) 0.05 % cream, Apply topically 2 (two) times a day, Disp: 30 g, Rfl: 0    clindamycin (CLEOCIN T) 1 % lotion, Apply to face, Disp: , Rfl:     levothyroxine 150 mcg tablet, TAKE 1 TABLET BY MOUTH EVERY DAY, Disp: 90 tablet, Rfl: 1    amphetamine-dextroamphetamine (ADDERALL, 20MG,) 20 mg tablet, Take 1 tablet (20 mg total) by mouth daily Max Daily Amount: 20 mg, Disp: 30 tablet, Rfl: 0    docusate sodium (COLACE) 250 MG capsule, Take 250 mg by mouth daily Patient reports she is taking off brand colace (not sure of the exact name) PRN (Patient not taking: Reported on 7/25/2023), Disp: , Rfl:     levothyroxine (Euthyrox) 100 mcg tablet, Take 1 tablet (100 mcg total) by mouth daily (Patient not taking: Reported on 4/24/2024), Disp: 30 tablet, Rfl: 3    levothyroxine (Euthyrox) 125 mcg tablet, Take 1 tablet (125 mcg total) by mouth daily (Patient not taking: Reported on 6/26/2024), Disp: 30 tablet, Rfl: 3    levothyroxine 88 mcg tablet, TAKE 1 TABLET BY MOUTH EVERY DAY (Patient not taking: Reported on 4/24/2024), Disp: 90 tablet, Rfl: 1    Prenatal Vit-Fe Fumarate-FA (PRENATAL VITAMIN PO), Take 1 tablet by mouth in the morning (Patient not taking: Reported on 4/24/2024), Disp: , Rfl:      ALLERGIES:  Allergies   Allergen Reactions    Strawberry Extract - Food Allergy Swelling        ACTIVE PROBLEMS:  Patient Active Problem List   Diagnosis    ADD (attention deficit disorder) without hyperactivity    Malignant melanoma of shoulder, right (HCC)    Family history of breast cancer    High risk medications (not anticoagulants) long-term use    Immunotherapy    Hypothyroidism     First trimester bleeding    Cancer complicating pregnancy in second trimester    Abnormal MSAFP (maternal serum alpha-fetoprotein), elevated    Thyroid disease affecting pregnancy    Encounter for follow-up surveillance of melanoma          PAST MEDICAL HISTORY:   Past Medical History:   Diagnosis Date    Cancer (HCC)     Skin cancer    Immunotherapy 2021    Lymphoma (HCC)     Melanoma (HCC)         PAST SURGICAL HISTORY:  Past Surgical History:   Procedure Laterality Date    LYMPH NODE BIOPSY Right 2021    Procedure: BIOPSY LYMPH NODE SENTINEL, INTRAOPERATIVE .LYMPHATIC MAPPING, LYMPHOSCINTIGRAPHY (NUC MED INJECT AT 0730);  Surgeon: Nicole Fonseca MD;  Location: [facility];  Service: Surgical Oncology    SKIN LESION EXCISION Right 2021    Procedure: EXCISION WIDE LESION UPPER EXTREMITY;  Surgeon: Nicole Fonseca MD;  Location: [facility];  Service: Surgical Oncology    SPLIT THICKNESS SKIN GRAFT Right 2021    Procedure: RIGHT SHOULDER  COMPLEX CLOSURE;  Surgeon: Felipe Landa MD;  Location: [facility];  Service: Plastics    WISDOM TOOTH EXTRACTION          SOCIAL HISTORY:  Social History     Socioeconomic History    Marital status: Single     Spouse name: None    Number of children: None    Years of education: None    Highest education level: None   Occupational History    None   Tobacco Use    Smoking status: Former     Current packs/day: 0.00     Average packs/day: 0.3 packs/day for 1 year (0.3 ttl pk-yrs)     Types: Cigarettes     Start date:      Quit date: 2018     Years since quittin.5    Smokeless tobacco: Never    Tobacco comments:     about 2-3 years ago    Vaping Use    Vaping status: Never Used   Substance and Sexual Activity    Alcohol use: Yes     Alcohol/week: 1.0 standard drink of alcohol     Types: 1 Cans of beer per week     Comment: rare    Drug use: No    Sexual activity: Yes     Partners: Male   Other Topics Concern    None   Social History Narrative  "   Denied history of daily coffee consumption      Social Determinants of Health     Financial Resource Strain: Low Risk  (3/29/2023)    [facility]    Overall Financial Resource Strain (CARDIA)     Difficulty of Paying Living Expenses: Not very hard   Food Insecurity: No Food Insecurity (3/29/2023)    [facility], [facility]    Hunger Vital Sign     Worried About Running Out of Food in the Last Year: Never true     Ran Out of Food in the Last Year: Never true   Transportation Needs: No Transportation Needs (3/29/2023)    [facility] from [facility], [facility]    PRAPARE - Transportation     Lack of Transportation (Medical): No     Lack of Transportation (Non-Medical): No   Physical Activity: Not on file   Stress: Not on file   Social Connections: Not on file   Intimate Partner Violence: Not At Risk (3/29/2023)    Received from [facility], [facility]    Humiliation, Afraid, Rape, and Kick questionnaire     Fear of Current or Ex-Partner: No     Emotionally Abused: No     Physically Abused: No     Sexually Abused: No   Housing Stability: Low Risk  (3/29/2023)    Received from [facility], [facility]    Housing Stability Vital Sign     Unable to Pay for Housing in the Last Year: No     Number of Places Lived in the Last Year: 1     Unstable Housing in the Last Year: No        FAMILY HISTORY:  Family History   Problem Relation Age of Onset    No Known Problems Mother     Breast cancer Maternal Grandmother     Breast cancer Maternal Aunt     Hypothyroidism Maternal Uncle            Objective    PHYSICAL EXAMINATION:   Blood pressure 106/62, pulse 66, temperature 98 °F (36.7 °C), temperature source Temporal, resp. rate 17, height 5' 7\" (1.702 m), weight 64.9 kg (143 lb), SpO2 98%, unknown if currently " breastfeeding.     Pain Score: 0-No pain     ECOG Performance Status      Flowsheet Row Most Recent Value   ECOG Performance Status 0 - Fully active, able to carry on all pre-disease performance without restriction               Physical Exam  Constitutional:       General: She is not in acute distress.     Appearance: Normal appearance. She is not ill-appearing or toxic-appearing.   HENT:      Head: Normocephalic and atraumatic.      Right Ear: External ear normal.      Left Ear: External ear normal.      Nose: Nose normal.      Mouth/Throat:      Mouth: Mucous membranes are moist.      Pharynx: Oropharynx is clear.   Eyes:      General: No scleral icterus.        Right eye: No discharge.         Left eye: No discharge.      Conjunctiva/sclera: Conjunctivae normal.   Cardiovascular:      Rate and Rhythm: Normal rate and regular rhythm.      Pulses: Normal pulses.      Heart sounds: No murmur heard.     No friction rub. No gallop.   Pulmonary:      Effort: Pulmonary effort is normal. No respiratory distress.      Breath sounds: Normal breath sounds. No wheezing or rales.   Abdominal:      General: Bowel sounds are normal. There is no distension.      Palpations: There is no mass.      Tenderness: There is no abdominal tenderness. There is no rebound.   Musculoskeletal:         General: No swelling or tenderness.      Cervical back: Normal range of motion. No rigidity.      Right lower leg: No edema.      Left lower leg: No edema.   Lymphadenopathy:      Head:      Right side of head: No submandibular, preauricular or posterior auricular adenopathy.      Left side of head: No submandibular, preauricular or posterior auricular adenopathy.      Cervical: No cervical adenopathy.      Right cervical: No superficial or posterior cervical adenopathy.     Left cervical: No superficial or posterior cervical adenopathy.      Upper Body:      Right upper body: No supraclavicular or axillary adenopathy.      Left upper body: No  supraclavicular or axillary adenopathy.      Lower Body: No right inguinal adenopathy. No left inguinal adenopathy.   Skin:     General: Skin is warm.      Coloration: Skin is not jaundiced.      Findings: No lesion or rash.      Comments: Well healed surgical scar.  No evidence of recurrence at primary site.   Neurological:      General: No focal deficit present.      Mental Status: She is alert and oriented to person, place, and time.      Cranial Nerves: No cranial nerve deficit.      Motor: No weakness.      Gait: Gait normal.   Psychiatric:         Mood and Affect: Mood normal.         Behavior: Behavior normal.         Thought Content: Thought content normal.         Judgment: Judgment normal.         I reviewed lab data in the chart.    WBC   Date Value Ref Range Status   05/29/2024 7.52 4.31 - 10.16 Thousand/uL Final   01/26/2024 6.81 4.31 - 10.16 Thousand/uL Final   09/29/2023 5.33 4.31 - 10.16 Thousand/uL Final     Hemoglobin   Date Value Ref Range Status   05/29/2024 13.3 11.5 - 15.4 g/dL Final   01/26/2024 13.9 11.5 - 15.4 g/dL Final   09/29/2023 14.4 11.5 - 15.4 g/dL Final     Platelets   Date Value Ref Range Status   05/29/2024 334 149 - 390 Thousands/uL Final   01/26/2024 360 149 - 390 Thousands/uL Final   09/29/2023 285 149 - 390 Thousands/uL Final     MCV   Date Value Ref Range Status   05/29/2024 90 82 - 98 fL Final   01/26/2024 94 82 - 98 fL Final   09/29/2023 93 82 - 98 fL Final      Potassium   Date Value Ref Range Status   05/29/2024 4.5 3.5 - 5.3 mmol/L Final   01/26/2024 4.2 3.5 - 5.3 mmol/L Final   09/29/2023 4.4 3.5 - 5.3 mmol/L Final   08/04/2021 4.7 3.5 - 5.2 mmol/L Final     Chloride   Date Value Ref Range Status   05/29/2024 104 96 - 108 mmol/L Final   01/26/2024 106 96 - 108 mmol/L Final   09/29/2023 107 96 - 108 mmol/L Final   08/04/2021 111 (H) 100 - 109 mmol/L Final     Carbon Dioxide   Date Value Ref Range Status   08/04/2021 26 23 - 31 mmol/L Final     CO2   Date Value Ref Range  Status   05/29/2024 25 21 - 32 mmol/L Final   01/26/2024 29 21 - 32 mmol/L Final   09/29/2023 29 21 - 32 mmol/L Final     BUN   Date Value Ref Range Status   05/29/2024 20 5 - 25 mg/dL Final   01/26/2024 15 5 - 25 mg/dL Final   09/29/2023 13 5 - 25 mg/dL Final   08/04/2021 14 7 - 25 mg/dL Final     Creatinine   Date Value Ref Range Status   05/29/2024 0.76 0.60 - 1.30 mg/dL Final     Comment:     Standardized to IDMS reference method   01/26/2024 0.84 0.60 - 1.30 mg/dL Final     Comment:     Standardized to IDMS reference method   09/29/2023 0.98 0.60 - 1.30 mg/dL Final     Comment:     Standardized to IDMS reference method   08/04/2021 0.80 0.40 - 1.10 mg/dL Final     Glucose   Date Value Ref Range Status   05/29/2024 82 65 - 140 mg/dL Final     Comment:     If the patient is fasting, the ADA then defines impaired fasting glucose as > 100 mg/dL and diabetes as > or equal to 123 mg/dL.   07/18/2022 82 65 - 140 mg/dL Final     Comment:     If the patient is fasting, the ADA then defines impaired fasting glucose as > 100 mg/dL and diabetes as > or equal to 123 mg/dL.  Specimen collection should occur prior to Sulfasalazine administration due to the potential for falsely depressed results. Specimen collection should occur prior to Sulfapyridine administration due to the potential for falsely elevated results.   06/14/2022 91 65 - 140 mg/dL Final     Comment:     If the patient is fasting, the ADA then defines impaired fasting glucose as > 100 mg/dL and diabetes as > or equal to 123 mg/dL.  Specimen collection should occur prior to Sulfasalazine administration due to the potential for falsely depressed results. Specimen collection should occur prior to Sulfapyridine administration due to the potential for falsely elevated results.   08/04/2021 78 65 - 99 mg/dL Final     eGFR, Non-   Date Value Ref Range Status   08/04/2021 101 >60 Final     eGFR,    Date Value Ref Range Status    08/04/2021 117 >60 Final     Calcium   Date Value Ref Range Status   05/29/2024 8.9 8.4 - 10.2 mg/dL Final   01/26/2024 9.1 8.4 - 10.2 mg/dL Final   09/29/2023 9.5 8.4 - 10.2 mg/dL Final   08/04/2021 9.1 8.5 - 10.1 mg/dL Final     Albumin   Date Value Ref Range Status   05/29/2024 4.2 3.5 - 5.0 g/dL Final   01/26/2024 4.4 3.5 - 5.0 g/dL Final   09/29/2023 4.4 3.5 - 5.0 g/dL Final     ALBUMIN   Date Value Ref Range Status   08/04/2021 4.1 3.5 - 4.8 g/dL Final     Total Bilirubin   Date Value Ref Range Status   05/29/2024 1.40 (H) 0.20 - 1.00 mg/dL Final     Comment:     Use of this assay is not recommended for patients undergoing treatment with eltrombopag due to the potential for falsely elevated results.  N-acetyl-p-benzoquinone imine (metabolite of Acetaminophen) will generate erroneously low results in samples for patients that have taken an overdose of Acetaminophen.   01/26/2024 1.01 (H) 0.20 - 1.00 mg/dL Final     Comment:     Use of this assay is not recommended for patients undergoing treatment with eltrombopag due to the potential for falsely elevated results.  N-acetyl-p-benzoquinone imine (metabolite of Acetaminophen) will generate erroneously low results in samples for patients that have taken an overdose of Acetaminophen.   09/29/2023 1.33 (H) 0.20 - 1.00 mg/dL Final     Comment:     Use of this assay is not recommended for patients undergoing treatment with eltrombopag due to the potential for falsely elevated results.  N-acetyl-p-benzoquinone imine (metabolite of Acetaminophen) will generate erroneously low results in samples for patients that have taken an overdose of Acetaminophen.   08/04/2021 0.8 0.2 - 1.0 mg/dL Final     Comment:     Use of this assay is not recommended for patients undergoing treatment with eltrombopag due to the potential for falsely elevated results.     Alkaline Phosphatase   Date Value Ref Range Status   05/29/2024 55 34 - 104 U/L Final   01/26/2024 76 34 - 104 U/L Final  "  09/29/2023 85 34 - 104 U/L Final   08/04/2021 103 35 - 120 U/L Final     AST   Date Value Ref Range Status   05/29/2024 15 13 - 39 U/L Final   01/26/2024 14 13 - 39 U/L Final   09/29/2023 20 13 - 39 U/L Final   08/04/2021 18 <41 U/L Final     ALT   Date Value Ref Range Status   05/29/2024 7 7 - 52 U/L Final     Comment:     Specimen collection should occur prior to Sulfasalazine administration due to the potential for falsely depressed results.    01/26/2024 9 7 - 52 U/L Final     Comment:     Specimen collection should occur prior to Sulfasalazine administration due to the potential for falsely depressed results.    09/29/2023 12 7 - 52 U/L Final     Comment:     Specimen collection should occur prior to Sulfasalazine administration due to the potential for falsely depressed results.    08/04/2021 20 <56 U/L Final      LD   Date Value Ref Range Status   05/29/2024 172 140 - 271 U/L Final   01/26/2024 179 140 - 271 U/L Final   09/29/2023 168 140 - 271 U/L Final     TSH   Date Value Ref Range Status   09/26/2022 1.37 0.36 - 3.74 uIU/mL Final     No results found for: \"[id number]\"   Free T4   Date Value Ref Range Status   05/29/2024 0.64 0.61 - 1.12 ng/dL Final     Comment:     Specimens with biotin concentrations > 10 ng/mL can lead to significant (> 10%) positive bias in result.   01/26/2024 0.83 0.61 - 1.12 ng/dL Final     Comment:     Specimens with biotin concentrations > 10 ng/mL can lead to significant (> 10%) positive bias in result.   09/29/2023 0.93 0.61 - 1.12 ng/dL Final     Comment:     Specimens with biotin concentrations > 10 ng/mL can lead to significant (> 10%) positive bias in result.         RECENT IMAGING:  Procedure: CT chest abdomen pelvis w contrast    Result Date: 7/22/2024  Narrative: CT CHEST, ABDOMEN AND PELVIS WITH IV CONTRAST INDICATION:   Encounter for follow-up examination after completed treatment for malignant neoplasm. Personal history of malignant melanoma of skin. Malignant melanoma " of right upper limb, including shoulder. Other long term (current) drug therapy. Encounter for other specified prophylactic measures. Hypothyroidism, unspecified. . COMPARISON: 1/5/2024. TECHNIQUE: CT examination of the chest, abdomen and pelvis was performed. Multiplanar 2D reformatted images were created from the source data. This examination, like all CT scans performed in the [facility], was performed utilizing techniques to minimize radiation dose exposure, including the use of iterative reconstruction and automated exposure control. Radiation dose length product (DLP) for this visit: IV Contrast: Enteric Contrast: Enteric contrast was administered. FINDINGS: CHEST LUNGS:  Lungs are clear.  There is no tracheal or endobronchial lesion. PLEURA:  Unremarkable. HEART/GREAT VESSELS: Heart is unremarkable for patient's age.  No thoracic aortic aneurysm. MEDIASTINUM AND SUNNY:  Unremarkable. CHEST WALL AND LOWER NECK:  Unremarkable. ABDOMEN LIVER/BILIARY TREE:  Unremarkable. GALLBLADDER:  No calcified gallstones. No pericholecystic inflammatory change. SPLEEN:  Unremarkable. PANCREAS:  Unremarkable. ADRENAL GLANDS:  Unremarkable. KIDNEYS/URETERS:  Unremarkable. No hydronephrosis. STOMACH AND BOWEL:  Unremarkable. APPENDIX:  No findings to suggest appendicitis. ABDOMINOPELVIC CAVITY:  No ascites.  No pneumoperitoneum.  No lymphadenopathy. VESSELS:  Unremarkable for patient's age. PELVIS REPRODUCTIVE ORGANS:  Unremarkable for patient's age. URINARY BLADDER:  Unremarkable. ABDOMINAL WALL/INGUINAL REGIONS:  Unremarkable. OSSEOUS STRUCTURES:  No acute fracture or destructive osseous lesion.     Impression: No recurrent or metastatic disease. Electronically signed: 07/22/2024 05:04 PM Kwame Collins MD           Assessment/Plan  Ms. Rivera is a 33-year-old female with stage IIb melanoma status post adjuvant treatment pembrolizumab with an abbreviated course due to becoming pregnant with her daughter here for  continued monitoring, follow-up and surveillance.    1. Encounter for follow-up surveillance of melanoma  2. Malignant melanoma of shoulder, right (HCC)  She is doing well with no clinical evidence of melanoma recurrence and imaging is negative for metastatic disease.  She was unable to get labs prior to today's visit.  She will get them following the visit.  She is otherwise doing well.  She is now 3 years out from diagnosis and we will continue to monitor her every 6 months.  She will return in 6 months.  At that time she will have blood work and repeat imaging.  All orders placed and prescription provided.  She knows to call with issues or concerns prior to her next visit.  She knows to continue to follow with dermatology.    - CBC and differential; Standing  - Comprehensive metabolic panel; Standing  - LD,Blood; Standing  - T3, free; Standing  - T4, free; Standing  - TSH, 3rd generation; Standing  - CT neck chest abdomen pelvis w contrast; Future    3. High risk medications (not anticoagulants) long-term use  She is status post 6 cycles of treatment with pembrolizumab.  She did develop hypothyroidism.  We continue to monitor TSH levels and adjust levothyroxine as needed.    - CBC and differential; Standing  - Comprehensive metabolic panel; Standing  - LD,Blood; Standing  - T3, free; Standing  - T4, free; Standing  - TSH, 3rd generation; Standing  - CT neck chest abdomen pelvis w contrast; Future        Return in about 6 months (around 1/29/2025) for Office Visit, labs, scans.     Ary Gaona MD, PhD

## 2024-08-30 DIAGNOSIS — F98.8 ADD (ATTENTION DEFICIT DISORDER) WITHOUT HYPERACTIVITY: ICD-10-CM

## 2024-08-30 RX ORDER — DEXTROAMPHETAMINE SACCHARATE, AMPHETAMINE ASPARTATE, DEXTROAMPHETAMINE SULFATE AND AMPHETAMINE SULFATE 5; 5; 5; 5 MG/1; MG/1; MG/1; MG/1
20 TABLET ORAL DAILY
Qty: 30 TABLET | Refills: 0 | Status: SHIPPED | OUTPATIENT
Start: 2024-08-30

## 2024-08-30 NOTE — TELEPHONE ENCOUNTER
Reason for call:   [x] Refill   [] Prior Auth  [] Other:     Office:   [x] PCP/Provider -   [] Specialty/Provider -     Medication: Terrence Vargas DO /[facility]     Dose/Frequency:  Take 1 tablet (20 mg total) by mouth daily     Quantity: 30    [id number] [phone]     Does the patient have enough for 3 days?   [] Yes   [x] No - Send as HP to POD

## 2024-08-30 NOTE — TELEPHONE ENCOUNTER
amphetamine-dextroamphetamine (ADDERALL, 20MG,) 20 mg tablet          Sig: Take 1 tablet (20 mg total) by mouth daily Max Daily Amount: 20 mg    Disp: 30 tablet    Refills: 0    Start: 8/30/2024    Earliest Fill Date: 8/30/2024    Class: Normal    PDMP Review May Be Needed    For: ADD (attention deficit disorder) without hyperactivity    Last ordered: 1 month ago (7/29/2024) by Terrence Vargas DO    Psychiatry:  Stimulants/ADHD [id number]/30/2024 09:50 AM   Protocol Details This refill cannot be delegated    Valid encounter within last 6 months      To be filled at: [facility]/pharmacy #[id number] - [place] - [address]

## 2024-09-16 ENCOUNTER — OFFICE VISIT (OUTPATIENT)
Dept: OBGYN CLINIC | Facility: CLINIC | Age: 34
End: 2024-09-16
Payer: COMMERCIAL

## 2024-09-16 VITALS
WEIGHT: 145 LBS | SYSTOLIC BLOOD PRESSURE: 114 MMHG | BODY MASS INDEX: 22.76 KG/M2 | HEIGHT: 67 IN | DIASTOLIC BLOOD PRESSURE: 78 MMHG

## 2024-09-16 DIAGNOSIS — Z01.419 ENCOUNTER FOR ANNUAL ROUTINE GYNECOLOGICAL EXAMINATION: Primary | ICD-10-CM

## 2024-09-16 DIAGNOSIS — Z80.3 FAMILY HISTORY OF BREAST CANCER: ICD-10-CM

## 2024-09-16 DIAGNOSIS — Z12.4 SCREENING FOR CERVICAL CANCER: ICD-10-CM

## 2024-09-16 PROBLEM — O9A.112: Status: RESOLVED | Noted: 2022-10-18 | Resolved: 2024-09-16

## 2024-09-16 PROBLEM — O28.0 ABNORMAL MSAFP (MATERNAL SERUM ALPHA-FETOPROTEIN), ELEVATED: Status: RESOLVED | Noted: 2022-12-06 | Resolved: 2024-09-16

## 2024-09-16 PROBLEM — E07.9 THYROID DISEASE AFFECTING PREGNANCY: Status: RESOLVED | Noted: 2022-12-06 | Resolved: 2024-09-16

## 2024-09-16 PROBLEM — O99.280 THYROID DISEASE AFFECTING PREGNANCY: Status: RESOLVED | Noted: 2022-12-06 | Resolved: 2024-09-16

## 2024-09-16 PROBLEM — O20.9 FIRST TRIMESTER BLEEDING: Status: RESOLVED | Noted: 2022-08-25 | Resolved: 2024-09-16

## 2024-09-16 PROCEDURE — G0145 SCR C/V CYTO,THINLAYER,RESCR: HCPCS | Performed by: OBSTETRICS & GYNECOLOGY

## 2024-09-16 PROCEDURE — G0476 HPV COMBO ASSAY CA SCREEN: HCPCS | Performed by: OBSTETRICS & GYNECOLOGY

## 2024-09-16 PROCEDURE — S0610 ANNUAL GYNECOLOGICAL EXAMINA: HCPCS | Performed by: OBSTETRICS & GYNECOLOGY

## 2024-09-16 NOTE — ASSESSMENT & PLAN NOTE
- Discussed ACOG guidelines for pap smear screening frequency: performed today  - Discussed healthy lifestyle recommendations for diet, exercise and self breast awareness.  - Discussed ACOG recommendations for screening mammograms: up to date/not indicated today.  - Discussed age based recommendations for adequate calcium and vitamin D intake. No additional osteoporosis screening indicated at this time.  - Discussed ACOG recommendations for colon cancer screening: not indicated at this time.  - Safe sex practices were discussed and STI testing was not desired by the patient  - Contraceptive options were reviewed: partner with vasectomy  - Routine follow up in 1 year was recommended or sooner as needed. All questions and concerns were addressed.

## 2024-09-16 NOTE — PROGRESS NOTES
Subjective      Tamiko Rivera is a 33 y.o. female who presents for annual exam.      Chief Complaint   Patient presents with    New Patient Visit     Np yearly     Regular menstrual cycles, no intermenstrual bleeding    with vasectomy  No concerns today       Last Pap: Not on file 2020 NILM       HPV vaccine completed:yes   Current contraception: vasectomy  History of abnormal Pap smear: yes - remote h/o abnml pap, denies excisional procedure  History of abnormal mammogram: no      Family history of uterine or ovarian cancer: no  Family history of breast cancer: yes - MGM, maternal aunt (40's)  Family history of colon cancer: no      Menstrual History:  OB History          2    Para   2    Term   2       0    AB   0    Living   2         SAB        IAB        Ectopic        Multiple        Live Births   2                    Patient's last menstrual period was 2024 (approximate).         Past Medical History:   Diagnosis Date    Cancer (HCC)     Skin cancer    Immunotherapy 2021    Lymphoma (HCC)     Melanoma (HCC)      Past Surgical History:   Procedure Laterality Date    LYMPH NODE BIOPSY Right 2021    Procedure: BIOPSY LYMPH NODE SENTINEL, INTRAOPERATIVE .LYMPHATIC MAPPING, LYMPHOSCINTIGRAPHY (NUC MED INJECT AT 0730);  Surgeon: Nicole Fonseca MD;  Location: [facility];  Service: Surgical Oncology    SKIN LESION EXCISION Right 2021    Procedure: EXCISION WIDE LESION UPPER EXTREMITY;  Surgeon: Nicole Fonseca MD;  Location: [facility];  Service: Surgical Oncology    SPLIT THICKNESS SKIN GRAFT Right 2021    Procedure: RIGHT SHOULDER  COMPLEX CLOSURE;  Surgeon: Felipe Landa MD;  Location: [facility];  Service: Plastics    WISDOM TOOTH EXTRACTION       Family History   Problem Relation Age of Onset    No Known Problems Mother     Breast cancer Maternal Grandmother     Breast cancer Maternal Aunt     Hypothyroidism Maternal Uncle     Colon cancer  Neg Hx     Ovarian cancer Neg Hx        Social History     Tobacco Use    Smoking status: Former     Current packs/day: 0.00     Average packs/day: 0.3 packs/day for 1 year (0.3 ttl pk-yrs)     Types: Cigarettes     Start date:      Quit date: 2018     Years since quittin.7    Smokeless tobacco: Never    Tobacco comments:     about 2-3 years ago    Vaping Use    Vaping status: Never Used   Substance Use Topics    Alcohol use: Yes     Alcohol/week: 1.0 standard drink of alcohol     Types: 1 Cans of beer per week     Comment: rare    Drug use: No          Current Outpatient Medications:     amphetamine-dextroamphetamine (ADDERALL, 20MG,) 20 mg tablet, Take 1 tablet (20 mg total) by mouth daily Max Daily Amount: 20 mg, Disp: 30 tablet, Rfl: 0    betamethasone dipropionate (DIPROSONE) 0.05 % cream, Apply topically 2 (two) times a day, Disp: 30 g, Rfl: 0    clindamycin (CLEOCIN T) 1 % lotion, Apply to face, Disp: , Rfl:     levothyroxine 150 mcg tablet, TAKE 1 TABLET BY MOUTH EVERY DAY, Disp: 90 tablet, Rfl: 1    levothyroxine (Euthyrox) 100 mcg tablet, Take 1 tablet (100 mcg total) by mouth daily (Patient not taking: Reported on 2024), Disp: 30 tablet, Rfl: 3    levothyroxine (Euthyrox) 125 mcg tablet, Take 1 tablet (125 mcg total) by mouth daily (Patient not taking: Reported on 2024), Disp: 30 tablet, Rfl: 3    levothyroxine 88 mcg tablet, TAKE 1 TABLET BY MOUTH EVERY DAY (Patient not taking: Reported on 2024), Disp: 90 tablet, Rfl: 1    Allergies   Allergen Reactions    Strawberry Extract - Food Allergy Swelling           Review of Systems   Constitutional:  Negative for appetite change, chills and fever.   Eyes:  Negative for visual disturbance.   Respiratory:  Negative for cough, chest tightness and shortness of breath.    Cardiovascular:  Negative for chest pain.   Gastrointestinal:  Negative for abdominal distention, abdominal pain, constipation, diarrhea, nausea and vomiting.   Endocrine:  "Negative for cold intolerance and heat intolerance.   Genitourinary:  Negative for difficulty urinating, dyspareunia, dysuria, frequency, genital sores, pelvic pain, urgency, vaginal bleeding, vaginal discharge and vaginal pain.   Musculoskeletal:  Negative for arthralgias.   Neurological:  Negative for light-headedness and headaches.   Hematological:  Does not bruise/bleed easily.   Psychiatric/Behavioral:  Negative for behavioral problems.    All other systems reviewed and are negative.      /78 (BP Location: Right arm, Patient Position: Sitting, Cuff Size: Adult)   Ht 5' 7\" (1.702 m)   Wt 65.8 kg (145 lb)   LMP 09/02/2024 (Approximate)   BMI 22.71 kg/m²         Physical Exam  Constitutional:       General: She is not in acute distress.     Appearance: Normal appearance.   Genitourinary:      Vulva, bladder and urethral meatus normal.      No lesions in the vagina.      Right Labia: No rash, tenderness, lesions or skin changes.     Left Labia: No tenderness, lesions, skin changes or rash.     No labial fusion noted.      No inguinal adenopathy present in the right or left side.     No vaginal discharge, erythema, tenderness, bleeding or ulceration.      No vaginal prolapse present.       Right Adnexa: not tender, not full and no mass present.     Left Adnexa: not tender, not full and no mass present.     No cervical motion tenderness, discharge, friability, lesion or polyp.      Uterus is not enlarged, fixed, tender or irregular.      No uterine mass detected.     Uterus is anteverted.      Pelvic exam was performed with patient in the lithotomy position.   Breasts:     Right: No swelling, bleeding, inverted nipple, mass, nipple discharge, skin change or tenderness.      Left: No swelling, bleeding, inverted nipple, mass, nipple discharge, skin change or tenderness.   HENT:      Head: Normocephalic and atraumatic.   Neck:      Thyroid: No thyromegaly.   Cardiovascular:      Rate and Rhythm: Normal rate " and regular rhythm.   Pulmonary:      Effort: Pulmonary effort is normal. No accessory muscle usage or respiratory distress.   Abdominal:      General: There is no distension.      Palpations: Abdomen is soft.      Tenderness: There is no abdominal tenderness. There is no guarding or rebound.   Musculoskeletal:         General: Normal range of motion.      Cervical back: Normal range of motion and neck supple.   Lymphadenopathy:      Upper Body:      Right upper body: No supraclavicular or axillary adenopathy.      Left upper body: No supraclavicular or axillary adenopathy.      Lower Body: No right inguinal and no right inguinal adenopathy. No left inguinal and no left inguinal adenopathy.   Neurological:      General: No focal deficit present.      Mental Status: She is alert.   Skin:     General: Skin is warm and dry.      Findings: No erythema.   Psychiatric:         Mood and Affect: Mood normal.         Behavior: Behavior normal.   Vitals and nursing note reviewed. Exam conducted with a chaperone present.               Encounter for annual routine gynecological examination  - Discussed ACOG guidelines for pap smear screening frequency: performed today  - Discussed healthy lifestyle recommendations for diet, exercise and self breast awareness.  - Discussed ACOG recommendations for screening mammograms: up to date/not indicated today.  - Discussed age based recommendations for adequate calcium and vitamin D intake. No additional osteoporosis screening indicated at this time.  - Discussed ACOG recommendations for colon cancer screening: not indicated at this time.  - Safe sex practices were discussed and STI testing was not desired by the patient  - Contraceptive options were reviewed: partner with vasectomy  - Routine follow up in 1 year was recommended or sooner as needed. All questions and concerns were addressed.       Family history of breast cancer  Genetic oncology referral placed

## 2024-09-17 LAB
HPV HR 12 DNA CVX QL NAA+PROBE: NEGATIVE
HPV16 DNA CVX QL NAA+PROBE: NEGATIVE
HPV18 DNA CVX QL NAA+PROBE: NEGATIVE

## 2024-09-20 LAB
LAB AP GYN PRIMARY INTERPRETATION: NORMAL
Lab: NORMAL

## 2024-10-07 DIAGNOSIS — F98.8 ADD (ATTENTION DEFICIT DISORDER) WITHOUT HYPERACTIVITY: ICD-10-CM

## 2024-10-07 NOTE — TELEPHONE ENCOUNTER
amphetamine-dextroamphetamine (ADDERALL, 20MG,) 20 mg tablet          Sig: Take 1 tablet (20 mg total) by mouth daily Max Daily Amount: 20 mg    Disp: 30 tablet    Refills: 0    Start: 10/7/2024    Earliest Fill Date: 10/7/2024    Class: Normal    PDMP Review May Be Needed    For: ADD (attention deficit disorder) without hyperactivity    Last ordered: 1 month ago (8/30/2024) by Terrence Vargas DO    Psychiatry:  Stimulants/ADHD [id number]/07/2024 09:16 AM   Protocol Details This refill cannot be delegated    Valid encounter within last 6 months      To be filled at: [facility]/pharmacy #[id number] - [place] - [phone]

## 2024-10-07 NOTE — TELEPHONE ENCOUNTER
Reason for call:   [x] Refill   [] Prior Auth  [] Other:     Office:   [x] PCP/Provider -   [] Specialty/Provider -     Medication: (ADDERALL, 20MG,) 20 mg tablet     Dose/Frequency:  Take 1 tablet (20 mg total) by mouth daily     Quantity: 30 tablet    Pharmacy:   [facility]/pharmacy #[id number]  [place] - [address] [phone]       Does the patient have enough for 3 days?   [x] Yes   [] No - Send as HP to POD

## 2024-10-08 RX ORDER — DEXTROAMPHETAMINE SACCHARATE, AMPHETAMINE ASPARTATE, DEXTROAMPHETAMINE SULFATE AND AMPHETAMINE SULFATE 5; 5; 5; 5 MG/1; MG/1; MG/1; MG/1
20 TABLET ORAL DAILY
Qty: 30 TABLET | Refills: 0 | Status: SHIPPED | OUTPATIENT
Start: 2024-10-08

## 2024-10-16 PROBLEM — Z01.419 ENCOUNTER FOR ANNUAL ROUTINE GYNECOLOGICAL EXAMINATION: Status: RESOLVED | Noted: 2024-09-16 | Resolved: 2024-10-16

## 2024-11-18 DIAGNOSIS — F98.8 ADD (ATTENTION DEFICIT DISORDER) WITHOUT HYPERACTIVITY: ICD-10-CM

## 2024-11-18 NOTE — TELEPHONE ENCOUNTER
Reason for call:   [x] Refill   [] Prior Auth  [] Other:     Office:   [x] PCP/Provider - [facility] POD  Authorized By: Terrence Vargas DO  [] Specialty/Provider -     Medication: amphetamine-dextroamphetamine (ADDERALL, 20MG,) 20 mg tablet     Dose/Frequency: Take 1 tablet (20 mg total) by mouth daily     Quantity: 30 tablet     Pharmacy: [facility]/pharmacy #[id number] [facility] [place] - [address]     Does the patient have enough for 3 days?   [x] Yes   [] No - Send as HP to POD

## 2024-11-19 RX ORDER — DEXTROAMPHETAMINE SACCHARATE, AMPHETAMINE ASPARTATE, DEXTROAMPHETAMINE SULFATE AND AMPHETAMINE SULFATE 5; 5; 5; 5 MG/1; MG/1; MG/1; MG/1
20 TABLET ORAL DAILY
Qty: 30 TABLET | Refills: 0 | Status: SHIPPED | OUTPATIENT
Start: 2024-11-19

## 2024-12-23 DIAGNOSIS — F98.8 ADD (ATTENTION DEFICIT DISORDER) WITHOUT HYPERACTIVITY: ICD-10-CM

## 2024-12-23 RX ORDER — DEXTROAMPHETAMINE SACCHARATE, AMPHETAMINE ASPARTATE, DEXTROAMPHETAMINE SULFATE AND AMPHETAMINE SULFATE 5; 5; 5; 5 MG/1; MG/1; MG/1; MG/1
20 TABLET ORAL DAILY
Qty: 30 TABLET | Refills: 0 | Status: SHIPPED | OUTPATIENT
Start: 2024-12-23

## 2024-12-23 NOTE — TELEPHONE ENCOUNTER
Reason for call:   [x] Refill   [] Prior Auth  [] Other:     Office:   [x] PCP/Provider - Terrence Vargas / [facility] [place] [facility]  [] Specialty/Provider -     Medication:     amphetamine-dextroamphetamine (ADDERALL, 20MG,) 20 mg tablet       Dose/Frequency: Take 1 tablet (20 mg total) by mouth daily     Quantity: 30    Pharmacy: [place] [id number]    Does the patient have enough for 3 days?   [] Yes   [x] No - Send as HP to POD

## 2024-12-26 ENCOUNTER — OFFICE VISIT (OUTPATIENT)
Dept: FAMILY MEDICINE CLINIC | Facility: CLINIC | Age: 34
End: 2024-12-26
Payer: COMMERCIAL

## 2024-12-26 VITALS
OXYGEN SATURATION: 99 % | WEIGHT: 145 LBS | BODY MASS INDEX: 22.76 KG/M2 | TEMPERATURE: 97.5 F | DIASTOLIC BLOOD PRESSURE: 66 MMHG | SYSTOLIC BLOOD PRESSURE: 106 MMHG | HEART RATE: 79 BPM | HEIGHT: 67 IN

## 2024-12-26 DIAGNOSIS — Z00.00 WELL ADULT EXAM: Primary | ICD-10-CM

## 2024-12-26 DIAGNOSIS — F98.8 ADD (ATTENTION DEFICIT DISORDER) WITHOUT HYPERACTIVITY: ICD-10-CM

## 2024-12-26 DIAGNOSIS — E03.9 HYPOTHYROIDISM, UNSPECIFIED TYPE: ICD-10-CM

## 2024-12-26 DIAGNOSIS — Z80.3 FAMILY HISTORY OF BREAST CANCER: ICD-10-CM

## 2024-12-26 DIAGNOSIS — Z12.31 ENCOUNTER FOR SCREENING MAMMOGRAM FOR MALIGNANT NEOPLASM OF BREAST: ICD-10-CM

## 2024-12-26 PROCEDURE — 99395 PREV VISIT EST AGE 18-39: CPT | Performed by: FAMILY MEDICINE

## 2024-12-26 NOTE — PROGRESS NOTES
Name: Tamiko Rivera      : 1990      MRN: [id number]  Encounter Provider: Terrence Vargas DO  Encounter Date: 2024   Encounter department: [facility]  :  Assessment & Plan  Well adult exam  Continue good diet and regular exercise.       ADD (attention deficit disorder) without hyperactivity  Continue Adderall as tolerated.       Hypothyroidism, unspecified type  Thyroid retest ordered.       Encounter for screening mammogram for malignant neoplasm of breast  Patient with family history of breast cancer.  She would like to start screening with ultrasound.  She is aware this may not be covered by insurance.  She is not interested in genetic testing but does have family history with 2 first-degree relatives.  Orders:    US breast right limited (diagnostic); Future    US breast left limited (diagnostic); Future    Family history of breast cancer    Orders:    Ambulatory Referral to Surgical Oncology; Future           History of Present Illness     Seen for well check.  She is due for lab testing.  She is following with oncologist regularly.  She also is due for thyroid retesting.  She has history of melanoma and  with no evidence of recurrence.  ADD symptoms are stable on 20 mg of Adderall and she tolerates this well.    She does have family history of breast cancer and is asking about starting to screen for breast cancer.  She would like to consider screening even now.  She has not seen genetic specialist previously and is not interested.  She will consider seeing a breast cancer specialist.      Review of Systems   Constitutional: Negative.    HENT: Negative.     Eyes: Negative.    Respiratory: Negative.     Cardiovascular: Negative.    Gastrointestinal: Negative.    Endocrine: Negative.    Genitourinary: Negative.    Musculoskeletal: Negative.    Skin: Negative.    Allergic/Immunologic: Negative.    Neurological: Negative.    Hematological: Negative.   "  Psychiatric/Behavioral: Negative.         Objective   /66 (BP Location: Left arm, Patient Position: Sitting, Cuff Size: Standard)   Pulse 79   Temp 97.5 °F (36.4 °C) (Tympanic)   Ht 5' 7\" (1.702 m)   Wt 65.8 kg (145 lb)   SpO2 99%   BMI 22.71 kg/m²      Physical Exam  Constitutional:       General: She is not in acute distress.     Appearance: She is well-developed. She is not diaphoretic.   HENT:      Head: Normocephalic and atraumatic.      Right Ear: External ear normal.      Left Ear: External ear normal.      Nose: Nose normal.      Mouth/Throat:      Pharynx: Oropharynx is clear.   Eyes:      General: No scleral icterus.        Right eye: No discharge.         Left eye: No discharge.      Conjunctiva/sclera: Conjunctivae normal.      Pupils: Pupils are equal, round, and reactive to light.   Neck:      Thyroid: No thyromegaly.      Vascular: No JVD.      Trachea: No tracheal deviation.   Cardiovascular:      Rate and Rhythm: Normal rate and regular rhythm.      Heart sounds: Normal heart sounds. No murmur heard.     No friction rub. No gallop.   Pulmonary:      Effort: Pulmonary effort is normal. No respiratory distress.      Breath sounds: Normal breath sounds. No wheezing or rales.   Chest:      Chest wall: No tenderness.   Abdominal:      General: Bowel sounds are normal. There is no distension.      Palpations: Abdomen is soft. There is no mass.      Tenderness: There is no abdominal tenderness. There is no guarding or rebound.      Hernia: No hernia is present.   Musculoskeletal:         General: No tenderness or deformity. Normal range of motion.      Cervical back: Normal range of motion and neck supple.   Lymphadenopathy:      Cervical: No cervical adenopathy.   Skin:     General: Skin is warm and dry.      Capillary Refill: Capillary refill takes less than 2 seconds.      Coloration: Skin is not pale.      Findings: No erythema or rash.   Neurological:      Mental Status: She is alert and " oriented to person, place, and time.      Cranial Nerves: No cranial nerve deficit.      Sensory: No sensory deficit.      Motor: No abnormal muscle tone.      Coordination: Coordination normal.      Deep Tendon Reflexes: Reflexes normal.   Psychiatric:         Mood and Affect: Mood normal.         Behavior: Behavior normal.

## 2024-12-27 ENCOUNTER — DOCUMENTATION (OUTPATIENT)
Dept: HEMATOLOGY ONCOLOGY | Facility: CLINIC | Age: 34
End: 2024-12-27

## 2024-12-27 NOTE — PROGRESS NOTES
Referral to surg onc  received.  Chart reviewed by oncology nurse navigator at this time.      Diagnosis: family history of breast cancer  After review of chart, instructions for scheduling added to referral and sent to be scheduled as advised.

## 2025-01-05 DIAGNOSIS — Z79.899 HIGH RISK MEDICATIONS (NOT ANTICOAGULANTS) LONG-TERM USE: ICD-10-CM

## 2025-01-05 DIAGNOSIS — E03.9 HYPOTHYROIDISM, UNSPECIFIED TYPE: ICD-10-CM

## 2025-01-06 RX ORDER — LEVOTHYROXINE SODIUM 150 UG/1
150 TABLET ORAL DAILY
Qty: 30 TABLET | Refills: 5 | Status: SHIPPED | OUTPATIENT
Start: 2025-01-06

## 2025-01-29 ENCOUNTER — APPOINTMENT (OUTPATIENT)
Dept: LAB | Facility: MEDICAL CENTER | Age: 35
End: 2025-01-29
Payer: COMMERCIAL

## 2025-01-29 DIAGNOSIS — Z08 ENCOUNTER FOR FOLLOW-UP SURVEILLANCE OF MELANOMA: ICD-10-CM

## 2025-01-29 DIAGNOSIS — Z79.899 HIGH RISK MEDICATIONS (NOT ANTICOAGULANTS) LONG-TERM USE: ICD-10-CM

## 2025-01-29 DIAGNOSIS — Z85.820 ENCOUNTER FOR FOLLOW-UP SURVEILLANCE OF MELANOMA: ICD-10-CM

## 2025-01-29 DIAGNOSIS — C43.61 MALIGNANT MELANOMA OF SHOULDER, RIGHT (HCC): ICD-10-CM

## 2025-01-29 LAB
BASOPHILS # BLD AUTO: 0.03 THOUSANDS/ΜL (ref 0–0.1)
BASOPHILS NFR BLD AUTO: 1 % (ref 0–1)
EOSINOPHIL # BLD AUTO: 0.13 THOUSAND/ΜL (ref 0–0.61)
EOSINOPHIL NFR BLD AUTO: 2 % (ref 0–6)
ERYTHROCYTE [DISTWIDTH] IN BLOOD BY AUTOMATED COUNT: 11.4 % (ref 11.6–15.1)
HCT VFR BLD AUTO: 38.1 % (ref 34.8–46.1)
HGB BLD-MCNC: 13.2 G/DL (ref 11.5–15.4)
IMM GRANULOCYTES # BLD AUTO: 0.01 THOUSAND/UL (ref 0–0.2)
IMM GRANULOCYTES NFR BLD AUTO: 0 % (ref 0–2)
LYMPHOCYTES # BLD AUTO: 1.62 THOUSANDS/ΜL (ref 0.6–4.47)
LYMPHOCYTES NFR BLD AUTO: 27 % (ref 14–44)
MCH RBC QN AUTO: 30.3 PG (ref 26.8–34.3)
MCHC RBC AUTO-ENTMCNC: 34.6 G/DL (ref 31.4–37.4)
MCV RBC AUTO: 88 FL (ref 82–98)
MONOCYTES # BLD AUTO: 0.55 THOUSAND/ΜL (ref 0.17–1.22)
MONOCYTES NFR BLD AUTO: 9 % (ref 4–12)
NEUTROPHILS # BLD AUTO: 3.73 THOUSANDS/ΜL (ref 1.85–7.62)
NEUTS SEG NFR BLD AUTO: 61 % (ref 43–75)
NRBC BLD AUTO-RTO: 0 /100 WBCS
PLATELET # BLD AUTO: 219 THOUSANDS/UL (ref 149–390)
PMV BLD AUTO: 9.6 FL (ref 8.9–12.7)
RBC # BLD AUTO: 4.35 MILLION/UL (ref 3.81–5.12)
T3FREE SERPL-MCNC: 3.26 PG/ML (ref 2.5–3.9)
T4 FREE SERPL-MCNC: 1.34 NG/DL (ref 0.61–1.12)
TSH SERPL DL<=0.05 MIU/L-ACNC: 0.07 UIU/ML (ref 0.45–4.5)
WBC # BLD AUTO: 6.07 THOUSAND/UL (ref 4.31–10.16)

## 2025-01-29 PROCEDURE — 84443 ASSAY THYROID STIM HORMONE: CPT

## 2025-01-29 PROCEDURE — 80053 COMPREHEN METABOLIC PANEL: CPT

## 2025-01-29 PROCEDURE — 84481 FREE ASSAY (FT-3): CPT

## 2025-01-29 PROCEDURE — 83615 LACTATE (LD) (LDH) ENZYME: CPT

## 2025-01-29 PROCEDURE — 84439 ASSAY OF FREE THYROXINE: CPT

## 2025-01-29 PROCEDURE — 85025 COMPLETE CBC W/AUTO DIFF WBC: CPT

## 2025-01-29 PROCEDURE — 36415 COLL VENOUS BLD VENIPUNCTURE: CPT

## 2025-01-30 LAB
ALBUMIN SERPL BCG-MCNC: 4.2 G/DL (ref 3.5–5)
ALP SERPL-CCNC: 45 U/L (ref 34–104)
ALT SERPL W P-5'-P-CCNC: 10 U/L (ref 7–52)
ANION GAP SERPL CALCULATED.3IONS-SCNC: 7 MMOL/L (ref 4–13)
AST SERPL W P-5'-P-CCNC: 16 U/L (ref 13–39)
BILIRUB SERPL-MCNC: 1.05 MG/DL (ref 0.2–1)
BUN SERPL-MCNC: 15 MG/DL (ref 5–25)
CALCIUM SERPL-MCNC: 9.2 MG/DL (ref 8.4–10.2)
CHLORIDE SERPL-SCNC: 103 MMOL/L (ref 96–108)
CO2 SERPL-SCNC: 27 MMOL/L (ref 21–32)
CREAT SERPL-MCNC: 0.65 MG/DL (ref 0.6–1.3)
GFR SERPL CREATININE-BSD FRML MDRD: 117 ML/MIN/1.73SQ M
GLUCOSE SERPL-MCNC: 71 MG/DL (ref 65–140)
LDH SERPL-CCNC: 167 U/L (ref 140–271)
POTASSIUM SERPL-SCNC: 4.3 MMOL/L (ref 3.5–5.3)
PROT SERPL-MCNC: 6.3 G/DL (ref 6.4–8.4)
SODIUM SERPL-SCNC: 137 MMOL/L (ref 135–147)

## 2025-02-05 DIAGNOSIS — F98.8 ADD (ATTENTION DEFICIT DISORDER) WITHOUT HYPERACTIVITY: ICD-10-CM

## 2025-02-05 RX ORDER — DEXTROAMPHETAMINE SACCHARATE, AMPHETAMINE ASPARTATE, DEXTROAMPHETAMINE SULFATE AND AMPHETAMINE SULFATE 5; 5; 5; 5 MG/1; MG/1; MG/1; MG/1
20 TABLET ORAL DAILY
Qty: 30 TABLET | Refills: 0 | Status: SHIPPED | OUTPATIENT
Start: 2025-02-05

## 2025-02-05 NOTE — TELEPHONE ENCOUNTER
amphetamine-dextroamphetamine (ADDERALL, 20MG,) 20 mg tablet         Sig: Take 1 tablet (20 mg total) by mouth daily Max Daily Amount: 20 mg    Disp: 30 tablet    Refills: 0    Start: 2/5/2025    Earliest Fill Date: 2/5/2025    Class: Normal    PDMP Review May Be Needed    Non-formulary For: ADD (attention deficit disorder) without hyperactivity    Last ordered: 1 month ago (12/23/2024) by Terrence Vargas DO    Psychiatry:  Stimulants/ADHD [id number]/05/2025 01:05 PM   Protocol Details This refill cannot be delegated    Valid encounter within last 6 months      To be filled at: [facility]/pharmacy #[id number] - [place] - [address]

## 2025-02-05 NOTE — TELEPHONE ENCOUNTER
Reason for call:   [x] Refill   [] Prior Auth  [] Other:     Office:   [x] PCP/Provider -   [] Specialty/Provider -     Medication: ADDERALL, 20MG,) 20 mg tablet     Dose/Frequency: Take 1 tablet (20 mg total) by mouth daily     Quantity: 30 tablet    [id number]      Does the patient have enough for 3 days?   [x] Yes   [] No - Send as HP to POD

## 2025-04-14 DIAGNOSIS — F98.8 ADD (ATTENTION DEFICIT DISORDER) WITHOUT HYPERACTIVITY: ICD-10-CM

## 2025-04-14 NOTE — TELEPHONE ENCOUNTER
Reason for call:   [x] Refill   [] Prior Auth  [] Other:     Office:   [x] PCP/Provider - [facility]   [] Specialty/Provider -     Medication:   ~ amphetamine-dextroamphetamine (ADDERALL, 20MG,) 20 mg tablet - Take 1 tablet (20 mg total) by mouth daily Max Daily Amount: 20 mg     Pharmacy:   [facility]/pharmacy #[id number] [place], [place] - [address]     Local Pharmacy   Does the patient have enough for 3 days?   [x] Yes   [] No - Send as HP to POD

## 2025-04-15 RX ORDER — DEXTROAMPHETAMINE SACCHARATE, AMPHETAMINE ASPARTATE, DEXTROAMPHETAMINE SULFATE AND AMPHETAMINE SULFATE 5; 5; 5; 5 MG/1; MG/1; MG/1; MG/1
20 TABLET ORAL DAILY
Qty: 30 TABLET | Refills: 0 | Status: SHIPPED | OUTPATIENT
Start: 2025-04-15

## 2025-05-21 DIAGNOSIS — F98.8 ADD (ATTENTION DEFICIT DISORDER) WITHOUT HYPERACTIVITY: ICD-10-CM

## 2025-05-21 NOTE — TELEPHONE ENCOUNTER
Reason for call:   [x] Refill   [] Prior Auth  [] Other:     Office:   [x] PCP/Provider -   [] Specialty/Provider -     Medication: (ADDERALL, 20MG,) 20 mg tablet     Dose/Frequency: Take 1 tablet (20 mg total) by mouth daily     Quantity: 30 tablet    Pharmacy: [facility]/pharmacy #[id number] [facility] [place] [facility]      Local Pharmacy   Does the patient have enough for 3 days?   [x] Yes   [] No - Send as HP to POD    Mail Away Pharmacy   Does the patient have enough for 10 days?   [] Yes   [] No - Send as HP to POD

## 2025-05-22 RX ORDER — DEXTROAMPHETAMINE SACCHARATE, AMPHETAMINE ASPARTATE, DEXTROAMPHETAMINE SULFATE AND AMPHETAMINE SULFATE 5; 5; 5; 5 MG/1; MG/1; MG/1; MG/1
20 TABLET ORAL DAILY
Qty: 30 TABLET | Refills: 0 | Status: SHIPPED | OUTPATIENT
Start: 2025-05-22

## 2025-06-17 ENCOUNTER — TELEPHONE (OUTPATIENT)
Dept: HEMATOLOGY ONCOLOGY | Facility: MEDICAL CENTER | Age: 35
End: 2025-06-17

## 2025-06-17 ENCOUNTER — TELEPHONE (OUTPATIENT)
Age: 35
End: 2025-06-17

## 2025-06-17 DIAGNOSIS — Z79.899 HIGH RISK MEDICATIONS (NOT ANTICOAGULANTS) LONG-TERM USE: ICD-10-CM

## 2025-06-17 DIAGNOSIS — E03.9 HYPOTHYROIDISM, UNSPECIFIED TYPE: ICD-10-CM

## 2025-06-17 DIAGNOSIS — E03.9 HYPOTHYROIDISM, UNSPECIFIED TYPE: Primary | ICD-10-CM

## 2025-06-17 RX ORDER — LEVOTHYROXINE SODIUM 150 UG/1
150 TABLET ORAL DAILY
Qty: 90 TABLET | Refills: 2 | OUTPATIENT
Start: 2025-06-17

## 2025-06-17 NOTE — TELEPHONE ENCOUNTER
Received refill request for levothyroxine  Patient cancelled CT scan and visit with Dr Gaona due for 1/2025  Offered to reschedule appts   Patient declined at this time and will ask PCP for refill of medication  Patient states she will call back to reschedule appts with Dr Jimenez AGUIRRE to Dr Gaona

## 2025-06-23 ENCOUNTER — RESULTS FOLLOW-UP (OUTPATIENT)
Dept: FAMILY MEDICINE CLINIC | Facility: CLINIC | Age: 35
End: 2025-06-23

## 2025-06-23 ENCOUNTER — APPOINTMENT (OUTPATIENT)
Dept: LAB | Facility: MEDICAL CENTER | Age: 35
End: 2025-06-23
Attending: FAMILY MEDICINE
Payer: COMMERCIAL

## 2025-06-23 DIAGNOSIS — E03.9 HYPOTHYROIDISM, UNSPECIFIED TYPE: ICD-10-CM

## 2025-06-23 DIAGNOSIS — E03.9 HYPOTHYROIDISM, UNSPECIFIED TYPE: Primary | ICD-10-CM

## 2025-06-23 LAB
T4 FREE SERPL-MCNC: 1.3 NG/DL (ref 0.61–1.12)
TSH SERPL DL<=0.05 MIU/L-ACNC: 0.04 UIU/ML (ref 0.45–4.5)

## 2025-06-23 PROCEDURE — 36415 COLL VENOUS BLD VENIPUNCTURE: CPT

## 2025-06-23 PROCEDURE — 84439 ASSAY OF FREE THYROXINE: CPT

## 2025-06-23 PROCEDURE — 84443 ASSAY THYROID STIM HORMONE: CPT

## 2025-06-23 RX ORDER — LEVOTHYROXINE SODIUM 100 UG/1
100 TABLET ORAL
Qty: 100 TABLET | Refills: 3 | Status: SHIPPED | OUTPATIENT
Start: 2025-06-23

## 2025-07-01 ENCOUNTER — OFFICE VISIT (OUTPATIENT)
Dept: FAMILY MEDICINE CLINIC | Facility: CLINIC | Age: 35
End: 2025-07-01
Payer: COMMERCIAL

## 2025-07-01 VITALS
HEART RATE: 70 BPM | SYSTOLIC BLOOD PRESSURE: 108 MMHG | HEIGHT: 67 IN | WEIGHT: 139.6 LBS | DIASTOLIC BLOOD PRESSURE: 80 MMHG | BODY MASS INDEX: 21.91 KG/M2 | OXYGEN SATURATION: 100 % | TEMPERATURE: 97.4 F

## 2025-07-01 DIAGNOSIS — E03.9 HYPOTHYROIDISM, UNSPECIFIED TYPE: Primary | ICD-10-CM

## 2025-07-01 PROCEDURE — 99214 OFFICE O/P EST MOD 30 MIN: CPT | Performed by: FAMILY MEDICINE

## 2025-07-01 RX ORDER — TRETINOIN 0.025 %
0.03 CREAM (GRAM) TOPICAL
COMMUNITY
Start: 2025-06-30

## 2025-07-01 RX ORDER — THYROID 90 MG/1
90 TABLET ORAL DAILY
Qty: 30 TABLET | Refills: 3 | Status: SHIPPED | OUTPATIENT
Start: 2025-07-01

## 2025-07-01 NOTE — PROGRESS NOTES
"Name: Tamiko Rivera      : 1990      MRN: [id number]  Encounter Provider: Terrence Vargas DO  Encounter Date: 2025   Encounter department: [facility]  :  Assessment & Plan  Hypothyroidism, unspecified type  We do lengthy discussion about switching medication.  She is interested in switching to [facility] Thyroid.  We discussed possible advantages and disadvantages of doing so.  I do agree that this is likely good to try as she has had some significant fluctuations in her TSH level and thyroid dosing previously.  Consider follow-up with endocrinology for their opinion on this as well.  Recommend TSH again in 4 weeks.  Adjust dose as needed. Time spent counseling reviewing treatment plan coordinating care and documentation was 30 minutes.   Orders:    TSH, 3rd generation with Free T4 reflex; Future    T3; Future    thyroid (ARMOUR) 90 MG tablet; Take 1 tablet (90 mg total) by mouth daily           History of Present Illness   Patient is here to discuss thyroid dosing.  Her TSH is generally fluctuated over the last 1 to 2 years and she has had multiple dose adjustments of the levothyroxine.      Review of Systems   Constitutional: Negative.    HENT: Negative.     Eyes: Negative.    Respiratory: Negative.     Cardiovascular: Negative.    Gastrointestinal: Negative.    Endocrine: Negative.    Genitourinary: Negative.    Musculoskeletal: Negative.    Skin: Negative.    Allergic/Immunologic: Negative.    Neurological: Negative.    Hematological: Negative.    Psychiatric/Behavioral: Negative.         Objective   /80   Pulse 70   Temp (!) 97.4 °F (36.3 °C)   Ht 5' 7\" (1.702 m)   Wt 63.3 kg (139 lb 9.6 oz)   LMP 06/10/2025 (Approximate)   SpO2 100%   BMI 21.86 kg/m²      Physical Exam  Constitutional:       General: She is not in acute distress.     Appearance: She is well-developed.     Neurological:      Mental Status: She is alert and oriented to person, place, and time. "     Psychiatric:         Behavior: Behavior normal.         Thought Content: Thought content normal.         Judgment: Judgment normal.

## 2025-07-01 NOTE — ASSESSMENT & PLAN NOTE
We do lengthy discussion about switching medication.  She is interested in switching to [facility] Thyroid.  We discussed possible advantages and disadvantages of doing so.  I do agree that this is likely good to try as she has had some significant fluctuations in her TSH level and thyroid dosing previously.  Consider follow-up with endocrinology for their opinion on this as well.  Recommend TSH again in 4 weeks.  Adjust dose as needed. Time spent counseling reviewing treatment plan coordinating care and documentation was 30 minutes.   Orders:    TSH, 3rd generation with Free T4 reflex; Future    T3; Future    thyroid (ARMOUR) 90 MG tablet; Take 1 tablet (90 mg total) by mouth daily

## 2025-07-02 DIAGNOSIS — F98.8 ADD (ATTENTION DEFICIT DISORDER) WITHOUT HYPERACTIVITY: ICD-10-CM

## 2025-07-03 RX ORDER — DEXTROAMPHETAMINE SACCHARATE, AMPHETAMINE ASPARTATE, DEXTROAMPHETAMINE SULFATE AND AMPHETAMINE SULFATE 5; 5; 5; 5 MG/1; MG/1; MG/1; MG/1
20 TABLET ORAL DAILY
Qty: 30 TABLET | Refills: 0 | Status: SHIPPED | OUTPATIENT
Start: 2025-07-03

## 2025-07-10 ENCOUNTER — OFFICE VISIT (OUTPATIENT)
Dept: FAMILY MEDICINE CLINIC | Facility: CLINIC | Age: 35
End: 2025-07-10
Payer: COMMERCIAL

## 2025-07-10 VITALS
DIASTOLIC BLOOD PRESSURE: 72 MMHG | TEMPERATURE: 98.2 F | HEART RATE: 69 BPM | OXYGEN SATURATION: 98 % | WEIGHT: 139.2 LBS | BODY MASS INDEX: 21.85 KG/M2 | SYSTOLIC BLOOD PRESSURE: 110 MMHG | HEIGHT: 67 IN

## 2025-07-10 DIAGNOSIS — T63.441A BEE STING, ACCIDENTAL OR UNINTENTIONAL, INITIAL ENCOUNTER: ICD-10-CM

## 2025-07-10 DIAGNOSIS — L03.90 CELLULITIS, UNSPECIFIED CELLULITIS SITE: Primary | ICD-10-CM

## 2025-07-10 DIAGNOSIS — T75.3XXA MOTION SICKNESS, INITIAL ENCOUNTER: ICD-10-CM

## 2025-07-10 PROCEDURE — 99213 OFFICE O/P EST LOW 20 MIN: CPT | Performed by: FAMILY MEDICINE

## 2025-07-10 RX ORDER — CEPHALEXIN 500 MG/1
500 CAPSULE ORAL 4 TIMES DAILY
Qty: 28 CAPSULE | Refills: 0 | Status: SHIPPED | OUTPATIENT
Start: 2025-07-10 | End: 2025-07-17

## 2025-07-10 RX ORDER — FLUCONAZOLE 150 MG/1
150 TABLET ORAL ONCE
Qty: 1 TABLET | Refills: 0 | Status: SHIPPED | OUTPATIENT
Start: 2025-07-10 | End: 2025-07-10

## 2025-07-10 RX ORDER — SCOPOLAMINE 1 MG/3D
1 PATCH, EXTENDED RELEASE TRANSDERMAL
Qty: 4 PATCH | Refills: 0 | Status: SHIPPED | OUTPATIENT
Start: 2025-07-10

## 2025-07-10 NOTE — PROGRESS NOTES
"Name: Tamiko Rivera      : 1990      MRN: [id number]  Encounter Provider: Terrence Vargas DO  Encounter Date: 7/10/2025   Encounter department: [facility]  :  Assessment & Plan  Cellulitis, unspecified cellulitis site  Side effect profile of medication reviewed.  Recommended daily antihistamine such as Claritin or Zyrtec.  Patient will monitor for any fevers.  Recommend return to office for recheck if any new or worsening symptoms.  Orders:    cephalexin (KEFLEX) 500 mg capsule; Take 1 capsule (500 mg total) by mouth 4 (four) times a day for 7 days    fluconazole (DIFLUCAN) 150 mg tablet; Take 1 tablet (150 mg total) by mouth once for 1 dose    Motion sickness, initial encounter    Orders:    scopolamine (TRANSDERM-SCOP) 1 mg/3 days TD 72 hr patch; Place 1 patch on the skin every third day over 72 hours    Bee sting, accidental or unintentional, initial encounter                History of Present Illness   Patient with a bee sting 2 days ago.  Her right arm is swollen at the site of bee sting.  She is leaving for [place] on vacation tomorrow.  Denies any other concerns other than some nausea she gets when she is traveling.  She would like medication for motion sickness.      Review of Systems   Constitutional: Negative.    HENT: Negative.     Eyes: Negative.    Respiratory: Negative.     Cardiovascular: Negative.    Gastrointestinal: Negative.    Endocrine: Negative.    Genitourinary: Negative.    Musculoskeletal: Negative.    Skin:  Positive for rash.   Allergic/Immunologic: Negative.    Neurological: Negative.    Hematological: Negative.    Psychiatric/Behavioral: Negative.         Objective   /72 (BP Location: Left arm, Patient Position: Sitting, Cuff Size: Standard)   Pulse 69   Temp 98.2 °F (36.8 °C) (Tympanic)   Ht 5' 7\" (1.702 m)   Wt 63.1 kg (139 lb 3.2 oz)   LMP 06/10/2025 (Approximate)   SpO2 98%   BMI 21.80 kg/m²      Physical Exam  Constitutional:       " General: She is not in acute distress.     Appearance: She is well-developed. She is not diaphoretic.   HENT:      Head: Normocephalic and atraumatic.      Right Ear: External ear normal.      Left Ear: External ear normal.      Nose: Nose normal.      Mouth/Throat:      Pharynx: Oropharynx is clear.     Eyes:      General: No scleral icterus.        Right eye: No discharge.         Left eye: No discharge.      Conjunctiva/sclera: Conjunctivae normal.      Pupils: Pupils are equal, round, and reactive to light.     Neck:      Thyroid: No thyromegaly.      Vascular: No JVD.      Trachea: No tracheal deviation.     Cardiovascular:      Rate and Rhythm: Normal rate and regular rhythm.      Heart sounds: Normal heart sounds. No murmur heard.     No friction rub. No gallop.   Pulmonary:      Effort: Pulmonary effort is normal. No respiratory distress.      Breath sounds: Normal breath sounds. No wheezing or rales.   Chest:      Chest wall: No tenderness.   Abdominal:      General: Bowel sounds are normal. There is no distension.      Palpations: Abdomen is soft. There is no mass.      Tenderness: There is no abdominal tenderness. There is no guarding or rebound.      Hernia: No hernia is present.     Musculoskeletal:         General: No tenderness or deformity. Normal range of motion.      Cervical back: Normal range of motion and neck supple.   Lymphadenopathy:      Cervical: No cervical adenopathy.     Skin:     General: Skin is warm and dry.      Capillary Refill: Capillary refill takes less than 2 seconds.      Coloration: Skin is not pale.      Findings: Rash (Patchy blanchable erythema to dorsal aspect of right forearm) present. No erythema.     Neurological:      Mental Status: She is alert and oriented to person, place, and time.      Cranial Nerves: No cranial nerve deficit.      Sensory: No sensory deficit.      Motor: No abnormal muscle tone.      Coordination: Coordination normal.      Deep Tendon Reflexes:  Reflexes normal.     Psychiatric:         Mood and Affect: Mood normal.         Behavior: Behavior normal.

## 2025-08-02 DIAGNOSIS — F98.8 ADD (ATTENTION DEFICIT DISORDER) WITHOUT HYPERACTIVITY: ICD-10-CM

## 2025-08-04 ENCOUNTER — TELEPHONE (OUTPATIENT)
Age: 35
End: 2025-08-04

## 2025-08-04 ENCOUNTER — APPOINTMENT (OUTPATIENT)
Dept: LAB | Facility: MEDICAL CENTER | Age: 35
End: 2025-08-04
Payer: COMMERCIAL

## 2025-08-04 DIAGNOSIS — E03.9 HYPOTHYROIDISM, UNSPECIFIED TYPE: ICD-10-CM

## 2025-08-04 LAB
T3 SERPL-MCNC: 0.7 NG/ML (ref 0.9–1.8)
T4 FREE SERPL-MCNC: 0.55 NG/DL (ref 0.61–1.12)
TSH SERPL DL<=0.05 MIU/L-ACNC: 5.89 UIU/ML (ref 0.45–4.5)

## 2025-08-04 PROCEDURE — 84480 ASSAY TRIIODOTHYRONINE (T3): CPT

## 2025-08-04 PROCEDURE — 84443 ASSAY THYROID STIM HORMONE: CPT

## 2025-08-04 PROCEDURE — 36415 COLL VENOUS BLD VENIPUNCTURE: CPT

## 2025-08-04 PROCEDURE — 84439 ASSAY OF FREE THYROXINE: CPT

## 2025-08-07 DIAGNOSIS — E03.9 HYPOTHYROIDISM, UNSPECIFIED TYPE: ICD-10-CM

## 2025-08-07 RX ORDER — DEXTROAMPHETAMINE SACCHARATE, AMPHETAMINE ASPARTATE, DEXTROAMPHETAMINE SULFATE AND AMPHETAMINE SULFATE 5; 5; 5; 5 MG/1; MG/1; MG/1; MG/1
20 TABLET ORAL DAILY
Qty: 30 TABLET | Refills: 0 | Status: SHIPPED | OUTPATIENT
Start: 2025-08-07

## 2025-08-14 ENCOUNTER — NURSE TRIAGE (OUTPATIENT)
Age: 35
End: 2025-08-14

## 2025-08-14 ENCOUNTER — TELEPHONE (OUTPATIENT)
Age: 35
End: 2025-08-14

## 2025-08-20 DIAGNOSIS — E03.9 HYPOTHYROIDISM, UNSPECIFIED TYPE: ICD-10-CM

## 2025-08-20 RX ORDER — THYROID 90 MG/1
90 TABLET ORAL DAILY
Qty: 90 TABLET | Refills: 1 | Status: SHIPPED | OUTPATIENT
Start: 2025-08-20 | End: 2025-08-25 | Stop reason: SDUPTHER

## 2025-08-21 ENCOUNTER — DOCUMENTATION (OUTPATIENT)
Age: 35
End: 2025-08-21

## (undated) DEVICE — SUT MONOCRYL PLUS 4-0 PS-2 18 IN MCP496G

## (undated) DEVICE — SURGICEL 4 X 8

## (undated) DEVICE — GLOVE SRG BIOGEL 6.5

## (undated) DEVICE — BULB SYRINGE,IRRIGATION WITH PROTECTIVE CAP: Brand: DOVER

## (undated) DEVICE — DRAPE PROBE NEO-PROBE/ULTRASOUND

## (undated) DEVICE — SYRINGE 20ML LL

## (undated) DEVICE — SUT ETHILON 2-0 FSLX 30 IN 1674H

## (undated) DEVICE — SUT MONOCRYL 5-0 PC-2 18 IN Y495G

## (undated) DEVICE — STERILE MUSCLE FLAP PACK: Brand: CARDINAL HEALTH

## (undated) DEVICE — BETHLEHEM UNIVERSAL OUTPATIENT: Brand: CARDINAL HEALTH

## (undated) DEVICE — SUT SILK 3-0 SH 30 IN K832H

## (undated) DEVICE — DERMATOME BLADES: Brand: DERMATOME

## (undated) DEVICE — SUT VICRYL 3-0 18 IN J110T

## (undated) DEVICE — INTENDED FOR TISSUE SEPARATION, AND OTHER PROCEDURES THAT REQUIRE A SHARP SURGICAL BLADE TO PUNCTURE OR CUT.: Brand: BARD-PARKER ® CARBON RIB-BACK BLADES

## (undated) DEVICE — INTENDED FOR TISSUE SEPARATION, AND OTHER PROCEDURES THAT REQUIRE A SHARP SURGICAL BLADE TO PUNCTURE OR CUT.: Brand: BARD-PARKER SAFETY BLADES SIZE 15, STERILE

## (undated) DEVICE — NEEDLE SPINAL 22G X 3.5IN  QUINCKE

## (undated) DEVICE — ANTIBACTERIAL UNDYED BRAIDED (POLYGLACTIN 910), SYNTHETIC ABSORBABLE SUTURE: Brand: COATED VICRYL

## (undated) DEVICE — NEEDLE 22 G X 1 1/2 SAFETY

## (undated) DEVICE — ADHESIVE SKN CLSR HISTOACRYL FLEX 0.5ML LF

## (undated) DEVICE — TUBING SUCTION 5MM X 12 FT

## (undated) DEVICE — DRESSING MEPILEX BORDER 4 X 8 IN

## (undated) DEVICE — SKIN MARKER DUAL TIP WITH RULER CAP, FLEXIBLE RULER AND LABELS: Brand: DEVON

## (undated) DEVICE — MEDI-VAC YANK SUCT HNDL W/TPRD BULBOUS TIP: Brand: CARDINAL HEALTH

## (undated) DEVICE — SPECIMEN CONTAINER STERILE PEEL PACK

## (undated) DEVICE — 3000CC GUARDIAN II: Brand: GUARDIAN

## (undated) DEVICE — SUT VICRYL 3-0 SH 27 IN J416H

## (undated) DEVICE — GLOVE INDICATOR PI UNDERGLOVE SZ 8.5 BLUE

## (undated) DEVICE — PLUMEPEN PRO 10FT

## (undated) DEVICE — GLOVE SRG BIOGEL ECLIPSE 8

## (undated) DEVICE — SCD SEQUENTIAL COMPRESSION COMFORT SLEEVE MEDIUM KNEE LENGTH: Brand: KENDALL SCD

## (undated) DEVICE — PROXIMATE SKIN STAPLERS (35 WIDE) CONTAINS 35 STAINLESS STEEL STAPLES (FIXED HEAD): Brand: PROXIMATE

## (undated) DEVICE — BETHLEHEM UNIVERSAL MINOR GEN: Brand: CARDINAL HEALTH

## (undated) DEVICE — CHLORAPREP HI-LITE 26ML ORANGE

## (undated) DEVICE — SUT VICRYL 3-0 18 IN J904T

## (undated) DEVICE — ADHESIVE SKIN HIGH VISCOSITY EXOFIN 1ML

## (undated) DEVICE — INTENDED FOR TISSUE SEPARATION, AND OTHER PROCEDURES THAT REQUIRE A SHARP SURGICAL BLADE TO PUNCTURE OR CUT.: Brand: BARD-PARKER SAFETY BLADES SIZE 10, STERILE

## (undated) DEVICE — SUT MONOCRYL 4-0 PS-2 18 IN Y496G

## (undated) DEVICE — SPONGE LAP 18 X 18 IN

## (undated) DEVICE — 3M™ STERI-STRIP™ REINFORCED ADHESIVE SKIN CLOSURES, R1547, 1/2 IN X 4 IN (12 MM X 100 MM), 6 STRIPS/ENVELOPE: Brand: 3M™ STERI-STRIP™

## (undated) DEVICE — NEEDLE 25G X 1 1/2

## (undated) DEVICE — SUT SILK 2-0 SH 30 IN K833H

## (undated) DEVICE — SYRINGE 10ML LL